# Patient Record
Sex: MALE | Race: WHITE | Employment: OTHER | URBAN - METROPOLITAN AREA
[De-identification: names, ages, dates, MRNs, and addresses within clinical notes are randomized per-mention and may not be internally consistent; named-entity substitution may affect disease eponyms.]

---

## 2019-03-05 LAB
ALBUMIN SERPL-MCNC: 3.9 G/DL
ALP BLD-CCNC: 76 U/L
ALT SERPL-CCNC: 15 U/L
ANION GAP SERPL CALCULATED.3IONS-SCNC: ABNORMAL MMOL/L
AST SERPL-CCNC: 14 U/L
BILIRUB SERPL-MCNC: 0.3 MG/DL (ref 0.1–1.4)
BUN BLDV-MCNC: 31 MG/DL
CALCIUM SERPL-MCNC: 9 MG/DL
CHLORIDE BLD-SCNC: 98 MMOL/L
CO2: 28 MMOL/L
CREAT SERPL-MCNC: 2.3 MG/DL
GFR CALCULATED: 24
GLUCOSE BLD-MCNC: 162 MG/DL
POTASSIUM SERPL-SCNC: 4.2 MMOL/L
SODIUM BLD-SCNC: 137 MMOL/L
TOTAL PROTEIN: 7.1

## 2019-04-30 ENCOUNTER — TELEPHONE (OUTPATIENT)
Dept: FAMILY MEDICINE CLINIC | Age: 84
End: 2019-04-30

## 2019-04-30 RX ORDER — ATENOLOL 50 MG/1
50 TABLET ORAL DAILY
Qty: 90 TABLET | Refills: 1
Start: 2019-04-30 | End: 2019-10-30 | Stop reason: SDUPTHER

## 2019-04-30 NOTE — TELEPHONE ENCOUNTER
----- Message from Carmelina Kehr sent at 4/30/2019 11:11 AM EDT -----  Contact: Jose M Lanier called requesting the following medication refills to be sent to  [facility]   ATENOLOL 50 MG 1 QAM

## 2019-04-30 NOTE — TELEPHONE ENCOUNTER
Paula Jacobsen called needing prescription refills.   Medications were reviewed and appropriate refills were sent to the requested pharmacy after provider approval.   (ATENOLOL) \[facility] [place] [facility]\"" SURGICAL [facility]    Electronically signed by Zeeshan Varela MA on 4/30/19 at 2:24 PM

## 2019-07-22 ENCOUNTER — TELEPHONE (OUTPATIENT)
Dept: FAMILY MEDICINE CLINIC | Age: 84
End: 2019-07-22

## 2019-07-22 NOTE — TELEPHONE ENCOUNTER
----- Message from Day Laurent sent at 7/22/2019  2:55 PM EDT -----  Contact: Melvin Hagan- daughter in law  Daughter in law is calling because she noticed he has red patches on both legs that are warm to touch. Fairly concerned out these and unable to get him in until 7/30/19 with Jemma.  What do you think he should do? ? 21.2

## 2019-07-22 NOTE — TELEPHONE ENCOUNTER
LM ON MORENO'S VM INFORMING THAT PER DR. MARTINEZ, THEY NEED TO TAKE PATIENT TO THE WALK IN CLINIC TO BE EVALUATED.

## 2019-07-23 ENCOUNTER — TELEPHONE (OUTPATIENT)
Dept: FAMILY MEDICINE CLINIC | Age: 84
End: 2019-07-23

## 2019-07-23 RX ORDER — FUROSEMIDE 20 MG/1
20 TABLET ORAL EVERY MORNING
COMMUNITY
End: 2019-09-10 | Stop reason: SDUPTHER

## 2019-07-23 RX ORDER — ATORVASTATIN CALCIUM 80 MG/1
80 TABLET, FILM COATED ORAL DAILY
COMMUNITY
End: 2019-09-10 | Stop reason: SDUPTHER

## 2019-07-23 RX ORDER — OXYCODONE AND ACETAMINOPHEN 7.5; 325 MG/1; MG/1
1 TABLET ORAL 2 TIMES DAILY PRN
COMMUNITY

## 2019-07-23 RX ORDER — CILOSTAZOL 50 MG/1
50 TABLET ORAL 2 TIMES DAILY
COMMUNITY
End: 2019-10-11 | Stop reason: SDUPTHER

## 2019-07-23 RX ORDER — FERROUS SULFATE 325(65) MG
325 TABLET ORAL EVERY OTHER DAY
COMMUNITY
End: 2021-01-01

## 2019-07-23 RX ORDER — FUROSEMIDE 40 MG/1
40 TABLET ORAL EVERY MORNING
COMMUNITY
End: 2019-09-10 | Stop reason: SDUPTHER

## 2019-07-23 RX ORDER — GABAPENTIN 300 MG/1
300 CAPSULE ORAL 3 TIMES DAILY
COMMUNITY

## 2019-08-26 RX ORDER — SITAGLIPTIN 50 MG/1
TABLET, FILM COATED ORAL
Qty: 90 TABLET | Refills: 0 | Status: SHIPPED | OUTPATIENT
Start: 2019-08-26 | End: 2019-09-07 | Stop reason: SDUPTHER

## 2019-09-09 RX ORDER — SITAGLIPTIN 50 MG/1
TABLET, FILM COATED ORAL
Qty: 30 TABLET | Refills: 0 | Status: SHIPPED | OUTPATIENT
Start: 2019-09-09 | End: 2019-11-25 | Stop reason: SDUPTHER

## 2019-09-10 ENCOUNTER — TELEPHONE (OUTPATIENT)
Dept: FAMILY MEDICINE CLINIC | Age: 84
End: 2019-09-10

## 2019-09-10 RX ORDER — ATORVASTATIN CALCIUM 80 MG/1
80 TABLET, FILM COATED ORAL DAILY
Qty: 30 TABLET | Refills: 0 | Status: SHIPPED | OUTPATIENT
Start: 2019-09-10 | End: 2019-10-11 | Stop reason: SDUPTHER

## 2019-09-10 RX ORDER — FUROSEMIDE 40 MG/1
40 TABLET ORAL EVERY MORNING
Qty: 30 TABLET | Refills: 0 | Status: SHIPPED | OUTPATIENT
Start: 2019-09-10 | End: 2019-10-11 | Stop reason: SDUPTHER

## 2019-09-10 RX ORDER — FUROSEMIDE 20 MG/1
20 TABLET ORAL EVERY MORNING
Qty: 30 TABLET | Refills: 0 | Status: SHIPPED | OUTPATIENT
Start: 2019-09-10 | End: 2019-10-11 | Stop reason: SDUPTHER

## 2019-09-10 NOTE — TELEPHONE ENCOUNTER
Isabela Alcantara called needing the following prescription refills:   Requested Prescriptions     Pending Prescriptions Disp Refills    furosemide (LASIX) 40 MG tablet 30 tablet 0     Sig: Take 1 tablet by mouth every morning Take with a 20 mg tablet to equal 60 mg.    furosemide (LASIX) 20 MG tablet 30 tablet 0     Sig: Take 1 tablet by mouth every morning Take with a 40 mg tablet to equal 60 mg.    atorvastatin (LIPITOR) 80 MG tablet 30 tablet 0     Sig: Take 1 tablet by mouth daily       Medications were reviewed and appropriate refills were sent to the requested pharmacy after provider approval.     Electronically signed by Raffi Tobias MA on 9/10/19 at 11:20 AM

## 2019-10-11 RX ORDER — FUROSEMIDE 20 MG/1
20 TABLET ORAL EVERY MORNING
Qty: 30 TABLET | Refills: 0 | Status: SHIPPED | OUTPATIENT
Start: 2019-10-11 | End: 2019-11-14 | Stop reason: SDUPTHER

## 2019-10-11 RX ORDER — FUROSEMIDE 40 MG/1
40 TABLET ORAL EVERY MORNING
Qty: 30 TABLET | Refills: 0 | Status: SHIPPED | OUTPATIENT
Start: 2019-10-11 | End: 2019-11-14 | Stop reason: SDUPTHER

## 2019-10-11 RX ORDER — GLIMEPIRIDE 4 MG/1
4 TABLET ORAL
Qty: 30 TABLET | Refills: 0 | Status: SHIPPED | OUTPATIENT
Start: 2019-10-11 | End: 2019-11-14 | Stop reason: SDUPTHER

## 2019-10-11 RX ORDER — ATORVASTATIN CALCIUM 80 MG/1
80 TABLET, FILM COATED ORAL DAILY
Qty: 30 TABLET | Refills: 0 | Status: SHIPPED | OUTPATIENT
Start: 2019-10-11 | End: 2019-11-14 | Stop reason: SDUPTHER

## 2019-10-11 RX ORDER — CILOSTAZOL 50 MG/1
50 TABLET ORAL 2 TIMES DAILY
Qty: 60 TABLET | Refills: 0 | Status: SHIPPED | OUTPATIENT
Start: 2019-10-11 | End: 2019-11-25 | Stop reason: SDUPTHER

## 2019-10-30 RX ORDER — ATENOLOL 50 MG/1
50 TABLET ORAL DAILY
Qty: 30 TABLET | Refills: 0 | Status: SHIPPED | OUTPATIENT
Start: 2019-10-30 | End: 2019-10-31 | Stop reason: SDUPTHER

## 2019-10-31 ENCOUNTER — TELEPHONE (OUTPATIENT)
Dept: FAMILY MEDICINE CLINIC | Age: 84
End: 2019-10-31

## 2019-10-31 RX ORDER — ATENOLOL 50 MG/1
50 TABLET ORAL DAILY
Qty: 30 TABLET | Refills: 0 | Status: SHIPPED | OUTPATIENT
Start: 2019-10-31 | End: 2019-11-25 | Stop reason: SDUPTHER

## 2019-11-15 RX ORDER — GLYBURIDE 5 MG/1
5 TABLET ORAL 2 TIMES DAILY
Qty: 30 TABLET | Refills: 5 | Status: SHIPPED | OUTPATIENT
Start: 2019-11-15 | End: 2021-01-01

## 2019-11-25 RX ORDER — ATENOLOL 50 MG/1
50 TABLET ORAL DAILY
Qty: 30 TABLET | Refills: 0 | Status: SHIPPED | OUTPATIENT
Start: 2019-11-25 | End: 2019-12-31 | Stop reason: SDUPTHER

## 2019-11-25 RX ORDER — CILOSTAZOL 50 MG/1
50 TABLET ORAL 2 TIMES DAILY
Qty: 60 TABLET | Refills: 0 | Status: SHIPPED | OUTPATIENT
Start: 2019-11-25 | End: 2020-01-22 | Stop reason: SDUPTHER

## 2019-11-25 RX ORDER — WARFARIN SODIUM 5 MG/1
2.2 TABLET ORAL DAILY
Qty: 30 TABLET | Refills: 3 | Status: SHIPPED | OUTPATIENT
Start: 2019-11-25 | End: 2020-02-10 | Stop reason: SDUPTHER

## 2019-12-16 RX ORDER — FUROSEMIDE 20 MG/1
20 TABLET ORAL EVERY MORNING
Qty: 30 TABLET | Refills: 0 | Status: SHIPPED | OUTPATIENT
Start: 2019-12-16 | End: 2020-01-22 | Stop reason: SDUPTHER

## 2019-12-16 RX ORDER — ATORVASTATIN CALCIUM 80 MG/1
80 TABLET, FILM COATED ORAL DAILY
Qty: 30 TABLET | Refills: 0 | Status: SHIPPED | OUTPATIENT
Start: 2019-12-16 | End: 2020-01-22 | Stop reason: SDUPTHER

## 2019-12-16 RX ORDER — FUROSEMIDE 40 MG/1
40 TABLET ORAL EVERY MORNING
Qty: 30 TABLET | Refills: 0 | Status: SHIPPED | OUTPATIENT
Start: 2019-12-16 | End: 2020-01-22 | Stop reason: SDUPTHER

## 2019-12-16 RX ORDER — GLIMEPIRIDE 4 MG/1
4 TABLET ORAL
Qty: 30 TABLET | Refills: 0 | Status: SHIPPED | OUTPATIENT
Start: 2019-12-16 | End: 2020-01-22 | Stop reason: SDUPTHER

## 2019-12-31 RX ORDER — ATENOLOL 50 MG/1
50 TABLET ORAL DAILY
Qty: 30 TABLET | Refills: 0 | Status: SHIPPED | OUTPATIENT
Start: 2019-12-31 | End: 2020-01-22 | Stop reason: SDUPTHER

## 2020-01-02 ENCOUNTER — HOSPITAL ENCOUNTER (EMERGENCY)
Age: 85
Discharge: HOME | End: 2020-01-02
Attending: EMERGENCY MEDICINE
Payer: MEDICARE

## 2020-01-02 ENCOUNTER — APPOINTMENT (OUTPATIENT)
Dept: CT IMAGING | Age: 85
End: 2020-01-02
Payer: MEDICARE

## 2020-01-02 VITALS
TEMPERATURE: 97.9 F | HEIGHT: 68 IN | BODY MASS INDEX: 27.28 KG/M2 | SYSTOLIC BLOOD PRESSURE: 110 MMHG | RESPIRATION RATE: 18 BRPM | HEART RATE: 75 BPM | DIASTOLIC BLOOD PRESSURE: 63 MMHG | WEIGHT: 180 LBS | OXYGEN SATURATION: 92 %

## 2020-01-02 LAB
ANION GAP SERPL CALCULATED.3IONS-SCNC: 11 MMOL/L (ref 4–16)
BUN BLDV-MCNC: 37 MG/DL (ref 6–23)
CALCIUM SERPL-MCNC: 8.7 MG/DL (ref 8.3–10.6)
CHLORIDE BLD-SCNC: 98 MMOL/L (ref 99–110)
CO2: 25 MMOL/L (ref 21–32)
CREAT SERPL-MCNC: 2.6 MG/DL (ref 0.9–1.3)
GFR AFRICAN AMERICAN: 28 ML/MIN/1.73M2
GFR NON-AFRICAN AMERICAN: 23 ML/MIN/1.73M2
GLUCOSE BLD-MCNC: 347 MG/DL (ref 70–99)
HCT VFR BLD CALC: 35.4 % (ref 42–52)
HEMOGLOBIN: 10.7 GM/DL (ref 13.5–18)
INR BLD: 2.25 INDEX
MCH RBC QN AUTO: 29 PG (ref 27–31)
MCHC RBC AUTO-ENTMCNC: 30.2 % (ref 32–36)
MCV RBC AUTO: 95.9 FL (ref 78–100)
PDW BLD-RTO: 13.2 % (ref 11.7–14.9)
PLATELET # BLD: 245 K/CU MM (ref 140–440)
PMV BLD AUTO: 9.7 FL (ref 7.5–11.1)
POTASSIUM SERPL-SCNC: 4 MMOL/L (ref 3.5–5.1)
PROTHROMBIN TIME: 27.5 SECONDS (ref 11.7–14.5)
RBC # BLD: 3.69 M/CU MM (ref 4.6–6.2)
SODIUM BLD-SCNC: 134 MMOL/L (ref 135–145)
WBC # BLD: 7.6 K/CU MM (ref 4–10.5)

## 2020-01-02 PROCEDURE — 6360000002 HC RX W HCPCS: Performed by: PHYSICIAN ASSISTANT

## 2020-01-02 PROCEDURE — 99284 EMERGENCY DEPT VISIT MOD MDM: CPT

## 2020-01-02 PROCEDURE — 70450 CT HEAD/BRAIN W/O DYE: CPT

## 2020-01-02 PROCEDURE — 85610 PROTHROMBIN TIME: CPT

## 2020-01-02 PROCEDURE — 90471 IMMUNIZATION ADMIN: CPT | Performed by: PHYSICIAN ASSISTANT

## 2020-01-02 PROCEDURE — 85027 COMPLETE CBC AUTOMATED: CPT

## 2020-01-02 PROCEDURE — 2500000003 HC RX 250 WO HCPCS: Performed by: PHYSICIAN ASSISTANT

## 2020-01-02 PROCEDURE — 80048 BASIC METABOLIC PNL TOTAL CA: CPT

## 2020-01-02 PROCEDURE — 4500000027

## 2020-01-02 PROCEDURE — 72125 CT NECK SPINE W/O DYE: CPT

## 2020-01-02 PROCEDURE — 90715 TDAP VACCINE 7 YRS/> IM: CPT | Performed by: PHYSICIAN ASSISTANT

## 2020-01-02 RX ORDER — LIDOCAINE HYDROCHLORIDE 10 MG/ML
5 INJECTION, SOLUTION EPIDURAL; INFILTRATION; INTRACAUDAL; PERINEURAL ONCE
Status: COMPLETED | OUTPATIENT
Start: 2020-01-02 | End: 2020-01-02

## 2020-01-02 RX ADMIN — LIDOCAINE HYDROCHLORIDE 5 ML: 10 INJECTION, SOLUTION EPIDURAL; INFILTRATION; INTRACAUDAL; PERINEURAL at 13:55

## 2020-01-02 RX ADMIN — TETANUS TOXOID, REDUCED DIPHTHERIA TOXOID AND ACELLULAR PERTUSSIS VACCINE, ADSORBED 0.5 ML: 5; 2.5; 8; 8; 2.5 SUSPENSION INTRAMUSCULAR at 16:15

## 2020-01-02 NOTE — ED PROVIDER NOTES
EMERGENCY DEPARTMENT ENCOUNTER      PCP: Gretchen Raymond MD    CHIEF COMPLAINT    Chief Complaint   Patient presents with    Fall       Of note, this patient was also evaluated by the attending physician, Dr. Carissa Wong      HPI    Hector Kowalski is a 80 y.o. male who presents to ED with complaints of abrasion to left side of face after tripping and falling. Pt states he tripped over his feet while coming up the stairs and hit his head on kitchen floor. Denies LOC. States that he does take Coumadin because he has had a pulmonary embolism in the past.  He has a laceration on his forehead. Unsure of tetanus        REVIEW OF SYSTEMS    Constitutional:  Denies fever, chills, weight loss or weakness   HENT:  Denies sore throat or ear pain   Cardiovascular:  Denies chest pain, palpitations   Respiratory:  Denies cough or shortness of breath    GI:  Denies abdominal pain, nausea, vomiting, or diarrhea  :  Denies any urinary symptoms. Musculoskeletal:  Denies back pain  Skin:  Denies rash  Neurologic:  Denies headache, focal weakness or sensory changes   Endocrine:  Denies polyuria or polydypsia   Lymphatic:  Denies swollen glands     All other review of systems are negative  See HPI and nursing notes for additional information     PAST MEDICAL AND SURGICAL HISTORY    Past Medical History:   Diagnosis Date    CAD (coronary artery disease)     Diabetes mellitus [facility] [address]     Hyperlipidemia     Hypertension      Past Surgical History:   Procedure Laterality Date    ABDOMEN SURGERY      hernia repair    CAROTID ENDARTERECTOMY  2008    Right    CORONARY ANGIOPLASTY WITH STENT PLACEMENT      2 stents    INGUINAL HERNIA REPAIR Left        CURRENT MEDICATIONS    Current Outpatient Rx   Medication Sig Dispense Refill    atenolol (TENORMIN) 50 MG tablet Take 1 tablet by mouth daily MUST MAKE APPT FOR FURTHER REFILLS.  30 tablet 0    furosemide (LASIX) 40 MG tablet Take 1 tablet by mouth every morning Take with a 20 mg tablet to equal 60 mg.  NO MORE REFILLS UNTIL PATIENT SCHEDULES AND KEEPS APPT 30 tablet 0    furosemide (LASIX) 20 MG tablet Take 1 tablet by mouth every morning Take with a 40 mg tablet to equal 60 mg.  NO MORE REFILLS UNTIL PATIENT SCHEDULES AND KEEPS APPT 30 tablet 0    atorvastatin (LIPITOR) 80 MG tablet Take 1 tablet by mouth daily NO MORE REFILLS UNTIL PATIENT SCHEDULES AND KEEPS APPT 30 tablet 0    glimepiride (AMARYL) 4 MG tablet Take 1 tablet by mouth every morning (before breakfast) NO  MORE REFILLS UNTIL PATIENT SCHEDULES AND KEEPS APPT 30 tablet 0    cilostazol (PLETAL) 50 MG tablet Take 1 tablet by mouth 2 times daily 60 tablet 0    SITagliptin (JANUVIA) 50 MG tablet TAKE ONE TABLET BY MOUTH ONCE EVERY DAY 30 tablet 5    warfarin (COUMADIN) 5 MG tablet Take 0.5 tablets by mouth daily 30 tablet 3    glyBURIDE (DIABETA) 5 MG tablet Take 1 tablet by mouth 2 times daily 30 tablet 5    gabapentin (NEURONTIN) 300 MG capsule Take 300 mg by mouth 3 times daily.  ferrous sulfate 325 (65 Fe) MG tablet Take 325 mg by mouth every other day      insulin aspart (NOVOLOG FLEXPEN) 100 UNIT/ML injection pen Inject 8 Units into the skin 3 times daily (before meals)      oxyCODONE-acetaminophen (PERCOCET) 7.5-325 MG per tablet Take 1 tablet by mouth 2 times daily as needed for Pain. Avoid Alcohol. ..causes drowsiness.  Insulin Degludec (TRESIBA FLEXTOUCH) 200 UNIT/ML SOPN Inject 25 Units into the skin nightly      ONETOUCH DELICA LANCETS FINE MISC 1 each 3 times daily and as needed.  blood glucose test strips (ONETOUCH VERIO) strip 1 each 3 times daily and as  needed.  oxyCODONE-acetaminophen (PERCOCET)  MG per tablet Take 1 tablet by mouth every 6 hours as needed for Pain Shingles pain      simvastatin (ZOCOR) 80 MG tablet Take 80 mg by mouth nightly.            ALLERGIES    No Known Allergies    SOCIAL AND FAMILY HISTORY    Social History     Socioeconomic History    Marital status:      Spouse name: None    Number of children: None    Years of education: None    Highest education level: None   Occupational History    None   Social Needs    Financial resource strain: None    Food insecurity:     Worry: None     Inability: None    Transportation needs:     Medical: None     Non-medical: None   Tobacco Use    Smoking status: Never Smoker    Smokeless tobacco: Never Used   Substance and Sexual Activity    Alcohol use: No    Drug use: No    Sexual activity: None   Lifestyle    Physical activity:     Days per week: None     Minutes per session: None    Stress: None   Relationships    Social connections:     Talks on phone: None     Gets together: None     Attends [employer] service: None     Active member of club or organization: None     Attends meetings of clubs or organizations: None     Relationship status: None    Intimate partner violence:     Fear of current or ex partner: None     Emotionally abused: None     Physically abused: None     Forced sexual activity: None   Other Topics Concern    None   Social History Narrative    None     History reviewed. No pertinent family history. PHYSICAL EXAM    VITAL SIGNS: BP (!) 133/97   Pulse 82   Temp 97.9 °F (36.6 °C) (Oral)   Resp 18   Ht 5' 8\" (1.727 m)   Wt 180 lb (81.6 kg)   SpO2 93%   BMI 27.37 kg/m²    Constitutional:  Well developed, Well nourished  HENT:  Normocephalic, Atraumatic, PERRL. EOMI. Sclera clear. Conjunctiva normal, No discharge. Neck/Lymphatics: supple, no JVD, no swollen nodes  Cardiovascular: Regular rate and rhythm no murmurs/rubs/gallops. No JVD  No carotid bruits or murmurs heard in carotids. Respiratory:  Nonlabored breathing. Normal breath sounds, No wheezing  Abdomen: Bowel sounds normal, Soft, No tenderness, no masses. Musculoskeletal:    There is no edema, asymmetry, or calf / thigh tenderness bilaterally. No cyanosis. No cool or pale-appearing limb.   Distal cap refill and

## 2020-01-02 NOTE — ED NOTES
Bed: ED-30  Expected date:   Expected time:   Means of arrival:   Comments:  [address], RN  01/02/20 5488

## 2020-01-02 NOTE — ED TRIAGE NOTES
Pt to ED with complaints of abrasion to left side of face after tripping and falling. Pt states he tripped over his feet while coming up the stairs and hit his head on kitchen floor. Denies LOC.

## 2020-01-02 NOTE — ED PROVIDER NOTES
I independently examined and evaluated Claus Hector. In brief their history revealed trip and fall. Did hit the left side of his forehead. Does take Coumadin for history of blood clot in the past.  States he tripped over his feet. Denies losing consciousness. Has some tenderness to his lateral left thigh and his forehead. Bleeding controlled. . Their exam revealed patient is awake, alert, oriented x4. Moves all extremities. Sensation intact. Wound has been sutured by JACQUES. CT head and CT C-spine do not show any acute findings. Imaging of his hip is also negative. All diagnostic, treatment, and disposition decisions were made by myself in conjunction with the mid-level provider. Before disposition, questions were sought and answered with the patient. They have voiced understanding and agree with the plan: Patient's vital signs are stable. Did need a tetanus update today. .  Patient's hemoglobin is stable at 10.7. INR is 2.25. Does have chronic kidney disease with a creatinine of 2.6. Patient is to follow-up in 7 to 10 days to have sutures removed. Return to ED if any worsening or new symptoms. For all further details of the patient's emergency department visit, please see the mid-level practitioner's documentation.         Jina Del Toro MD  01/02/20 4914

## 2020-01-02 NOTE — ED NOTES
Assisted patient up patient up to use urinal. Updated on status and plan of care. Patient returned to bed and positioned for comfort.       Kennedy Fan RN  01/02/20 1110

## 2020-01-21 RX ORDER — GLIMEPIRIDE 4 MG/1
4 TABLET ORAL
Qty: 30 TABLET | Refills: 0 | OUTPATIENT
Start: 2020-01-21

## 2020-01-21 RX ORDER — FUROSEMIDE 40 MG/1
40 TABLET ORAL EVERY MORNING
Qty: 30 TABLET | Refills: 0 | OUTPATIENT
Start: 2020-01-21

## 2020-01-21 RX ORDER — FUROSEMIDE 20 MG/1
20 TABLET ORAL EVERY MORNING
Qty: 30 TABLET | Refills: 0 | OUTPATIENT
Start: 2020-01-21

## 2020-01-21 RX ORDER — ATORVASTATIN CALCIUM 80 MG/1
80 TABLET, FILM COATED ORAL DAILY
Qty: 30 TABLET | Refills: 0 | OUTPATIENT
Start: 2020-01-21

## 2020-01-22 RX ORDER — FUROSEMIDE 40 MG/1
40 TABLET ORAL EVERY MORNING
Qty: 30 TABLET | Refills: 0 | Status: SHIPPED | OUTPATIENT
Start: 2020-01-22 | End: 2020-02-10 | Stop reason: SDUPTHER

## 2020-01-22 RX ORDER — CILOSTAZOL 50 MG/1
50 TABLET ORAL 2 TIMES DAILY
Qty: 60 TABLET | Refills: 0 | Status: SHIPPED | OUTPATIENT
Start: 2020-01-22 | End: 2020-02-10 | Stop reason: SDUPTHER

## 2020-01-22 RX ORDER — GLIMEPIRIDE 4 MG/1
4 TABLET ORAL
Qty: 30 TABLET | Refills: 0 | Status: SHIPPED | OUTPATIENT
Start: 2020-01-22 | End: 2020-02-10 | Stop reason: SDUPTHER

## 2020-01-22 RX ORDER — FUROSEMIDE 20 MG/1
20 TABLET ORAL EVERY MORNING
Qty: 30 TABLET | Refills: 0 | Status: SHIPPED | OUTPATIENT
Start: 2020-01-22 | End: 2020-02-10 | Stop reason: SDUPTHER

## 2020-01-22 RX ORDER — ATENOLOL 50 MG/1
50 TABLET ORAL DAILY
Qty: 30 TABLET | Refills: 0 | Status: SHIPPED | OUTPATIENT
Start: 2020-01-22 | End: 2020-01-30 | Stop reason: SDUPTHER

## 2020-01-22 RX ORDER — ATORVASTATIN CALCIUM 80 MG/1
80 TABLET, FILM COATED ORAL DAILY
Qty: 30 TABLET | Refills: 0 | Status: SHIPPED | OUTPATIENT
Start: 2020-01-22 | End: 2020-02-10 | Stop reason: SDUPTHER

## 2020-01-22 NOTE — TELEPHONE ENCOUNTER
Patient is out of medications. Medications were refused on 01/16/20. Patient has scheduled appt on 02/04/20 @2:30pm with Zoe Jones. Please send additional 30 day supply. Thank you!

## 2020-01-30 RX ORDER — ATENOLOL 50 MG/1
50 TABLET ORAL DAILY
Qty: 30 TABLET | Refills: 0 | Status: SHIPPED | OUTPATIENT
Start: 2020-01-30 | End: 2020-02-28 | Stop reason: SDUPTHER

## 2020-02-10 ENCOUNTER — OFFICE VISIT (OUTPATIENT)
Dept: FAMILY MEDICINE CLINIC | Age: 85
End: 2020-02-10
Payer: MEDICARE

## 2020-02-10 ENCOUNTER — TELEPHONE (OUTPATIENT)
Dept: FAMILY MEDICINE CLINIC | Age: 85
End: 2020-02-10

## 2020-02-10 VITALS
HEIGHT: 69 IN | OXYGEN SATURATION: 93 % | HEART RATE: 69 BPM | WEIGHT: 185.2 LBS | SYSTOLIC BLOOD PRESSURE: 118 MMHG | DIASTOLIC BLOOD PRESSURE: 70 MMHG | BODY MASS INDEX: 27.43 KG/M2

## 2020-02-10 DIAGNOSIS — N18.4 ANEMIA DUE TO STAGE 4 CHRONIC KIDNEY DISEASE (HCC): ICD-10-CM

## 2020-02-10 DIAGNOSIS — N18.4 CKD (CHRONIC KIDNEY DISEASE) STAGE 4, GFR 15-29 ML/MIN (HCC): ICD-10-CM

## 2020-02-10 DIAGNOSIS — E11.9 TYPE 2 DIABETES MELLITUS WITHOUT COMPLICATION, WITHOUT LONG-TERM CURRENT USE OF INSULIN (HCC): ICD-10-CM

## 2020-02-10 DIAGNOSIS — D63.1 ANEMIA DUE TO STAGE 4 CHRONIC KIDNEY DISEASE (HCC): ICD-10-CM

## 2020-02-10 LAB
A/G RATIO: 1.1 (ref 1.1–2.2)
ALBUMIN SERPL-MCNC: 4.3 G/DL (ref 3.4–5)
ALP BLD-CCNC: 90 U/L (ref 40–129)
ALT SERPL-CCNC: 9 U/L (ref 10–40)
ANION GAP SERPL CALCULATED.3IONS-SCNC: 15 MMOL/L (ref 3–16)
AST SERPL-CCNC: 12 U/L (ref 15–37)
BASOPHILS ABSOLUTE: 0.1 K/UL (ref 0–0.2)
BASOPHILS RELATIVE PERCENT: 0.7 %
BILIRUB SERPL-MCNC: 0.3 MG/DL (ref 0–1)
BUN BLDV-MCNC: 38 MG/DL (ref 7–20)
CALCIUM SERPL-MCNC: 9.5 MG/DL (ref 8.3–10.6)
CHLORIDE BLD-SCNC: 103 MMOL/L (ref 99–110)
CO2: 27 MMOL/L (ref 21–32)
CREAT SERPL-MCNC: 2.7 MG/DL (ref 0.8–1.3)
EOSINOPHILS ABSOLUTE: 1.3 K/UL (ref 0–0.6)
EOSINOPHILS RELATIVE PERCENT: 17.1 %
GFR AFRICAN AMERICAN: 27
GFR NON-AFRICAN AMERICAN: 22
GLOBULIN: 3.8 G/DL
GLUCOSE BLD-MCNC: 123 MG/DL (ref 70–99)
HCT VFR BLD CALC: 33.8 % (ref 40.5–52.5)
HEMOGLOBIN: 11.3 G/DL (ref 13.5–17.5)
LYMPHOCYTES ABSOLUTE: 2 K/UL (ref 1–5.1)
LYMPHOCYTES RELATIVE PERCENT: 26.2 %
MCH RBC QN AUTO: 30.6 PG (ref 26–34)
MCHC RBC AUTO-ENTMCNC: 33.6 G/DL (ref 31–36)
MCV RBC AUTO: 91.1 FL (ref 80–100)
MONOCYTES ABSOLUTE: 0.5 K/UL (ref 0–1.3)
MONOCYTES RELATIVE PERCENT: 7.1 %
NEUTROPHILS ABSOLUTE: 3.7 K/UL (ref 1.7–7.7)
NEUTROPHILS RELATIVE PERCENT: 48.9 %
PDW BLD-RTO: 15.6 % (ref 12.4–15.4)
PLATELET # BLD: 185 K/UL (ref 135–450)
PMV BLD AUTO: 8.9 FL (ref 5–10.5)
POTASSIUM SERPL-SCNC: 4 MMOL/L (ref 3.5–5.1)
RBC # BLD: 3.71 M/UL (ref 4.2–5.9)
SODIUM BLD-SCNC: 145 MMOL/L (ref 136–145)
TOTAL PROTEIN: 8.1 G/DL (ref 6.4–8.2)
WBC # BLD: 7.6 K/UL (ref 4–11)

## 2020-02-10 PROCEDURE — 99214 OFFICE O/P EST MOD 30 MIN: CPT | Performed by: PHYSICIAN ASSISTANT

## 2020-02-10 RX ORDER — GLIMEPIRIDE 4 MG/1
4 TABLET ORAL
Qty: 90 TABLET | Refills: 1 | Status: SHIPPED | OUTPATIENT
Start: 2020-02-10 | End: 2020-08-19 | Stop reason: SDUPTHER

## 2020-02-10 RX ORDER — ATORVASTATIN CALCIUM 80 MG/1
80 TABLET, FILM COATED ORAL DAILY
Qty: 90 TABLET | Refills: 1 | Status: SHIPPED | OUTPATIENT
Start: 2020-02-10 | End: 2020-08-19 | Stop reason: SDUPTHER

## 2020-02-10 RX ORDER — FUROSEMIDE 40 MG/1
40 TABLET ORAL EVERY MORNING
Qty: 90 TABLET | Refills: 1 | Status: SHIPPED | OUTPATIENT
Start: 2020-02-10 | End: 2020-08-19 | Stop reason: SDUPTHER

## 2020-02-10 RX ORDER — FUROSEMIDE 20 MG/1
20 TABLET ORAL EVERY MORNING
Qty: 30 TABLET | Refills: 0 | Status: SHIPPED | OUTPATIENT
Start: 2020-02-10 | End: 2020-04-01 | Stop reason: SDUPTHER

## 2020-02-10 RX ORDER — CILOSTAZOL 50 MG/1
50 TABLET ORAL 2 TIMES DAILY
Qty: 180 TABLET | Refills: 1 | Status: SHIPPED | OUTPATIENT
Start: 2020-02-10 | End: 2020-08-19 | Stop reason: SDUPTHER

## 2020-02-10 RX ORDER — WARFARIN SODIUM 5 MG/1
2.5 TABLET ORAL DAILY
Qty: 45 TABLET | Refills: 1 | Status: SHIPPED | OUTPATIENT
Start: 2020-02-10 | End: 2020-03-05 | Stop reason: SDUPTHER

## 2020-02-10 ASSESSMENT — PATIENT HEALTH QUESTIONNAIRE - PHQ9
1. LITTLE INTEREST OR PLEASURE IN DOING THINGS: 0
SUM OF ALL RESPONSES TO PHQ QUESTIONS 1-9: 0
2. FEELING DOWN, DEPRESSED OR HOPELESS: 0
SUM OF ALL RESPONSES TO PHQ QUESTIONS 1-9: 0
SUM OF ALL RESPONSES TO PHQ9 QUESTIONS 1 & 2: 0

## 2020-02-10 NOTE — TELEPHONE ENCOUNTER
TO ELIO-    REGARDING:  TRESIBA FLEXTOUCH PENS    COULD YOU CHANGE THE ML FROM 3 ML TO 9 ML OR CHANGE TO 3 PENS---THE PHARMACIST (ROMA) SAID THEY DON'T USUALLY BREAK A BOX TO FILL PRESCRIPTIONS FOR THIS.

## 2020-02-10 NOTE — PROGRESS NOTES
vaccination. REVIEW OF SYMPTOMS    Constitutional:  Denies fever, chills, weight loss or weakness  Eyes:  Denies photophobia or discharge  ENT:  Denies sore throat or ear pain  Cardiovascular:  Denies chest pain, palpitations or swelling  Respiratory:  Denies cough or shortness of breath  GI:  Denies abdominal pain, nausea, vomiting, or diarrhea  Skin:  No rashes  Neurologic:  Denies headache, focal weakness, or sensory changes  Endocrine:  Denies polyuria or polydipsia  Lymphatic:  Denies swollen glands  Psychiatric:  Denies depression, suicidal ideation or homicidal ideation    PAST MEDICAL HISTORY  Past Medical History:   Diagnosis Date    CAD (coronary artery disease)     Diabetes mellitus ([facility] [address]     Hyperlipidemia     Hypertension        FAMILY HISTORY  No family history on file.     SOCIAL HISTORY  Social History     Socioeconomic History    Marital status:      Spouse name: None    Number of children: None    Years of education: None    Highest education level: None   Occupational History    None   Social Needs    Financial resource strain: None    Food insecurity:     Worry: None     Inability: None    Transportation needs:     Medical: None     Non-medical: None   Tobacco Use    Smoking status: Never Smoker    Smokeless tobacco: Never Used   Substance and Sexual Activity    Alcohol use: No    Drug use: No    Sexual activity: None   Lifestyle    Physical activity:     Days per week: None     Minutes per session: None    Stress: None   Relationships    Social connections:     Talks on phone: None     Gets together: None     Attends [employer] service: None     Active member of club or organization: None     Attends meetings of clubs or organizations: None     Relationship status: None    Intimate partner violence:     Fear of current or ex partner: None     Emotionally abused: None     Physically abused: None     Forced sexual activity: None   Other Topics Concern    None   Social History Narrative    None        SURGICAL HISTORY  Past Surgical History:   Procedure Laterality Date    ABDOMEN SURGERY      hernia repair    CAROTID ENDARTERECTOMY  2008    Right    CORONARY ANGIOPLASTY WITH STENT PLACEMENT      2 stents    INGUINAL HERNIA REPAIR Left        CURRENT MEDICATIONS  Current Outpatient Medications   Medication Sig Dispense Refill    furosemide (LASIX) 40 MG tablet Take 1 tablet by mouth every morning Take with a 20 mg tablet to equal 60 mg. 90 tablet 1    cilostazol (PLETAL) 50 MG tablet Take 1 tablet by mouth 2 times daily 180 tablet 1    atorvastatin (LIPITOR) 80 MG tablet Take 1 tablet by mouth daily 90 tablet 1    warfarin (COUMADIN) 5 MG tablet Take 0.5 tablets by mouth daily 45 tablet 1    SITagliptin (JANUVIA) 50 MG tablet TAKE ONE TABLET BY MOUTH ONCE EVERY DAY 90 tablet 1    glimepiride (AMARYL) 4 MG tablet Take 1 tablet by mouth every morning (before breakfast) 90 tablet 1    furosemide (LASIX) 20 MG tablet Take 1 tablet by mouth every morning Take with a 40 mg tablet to equal 60 mg. 30 tablet 0    insulin aspart (NOVOLOG FLEXPEN) 100 UNIT/ML injection pen Inject 8 Units into the skin 3 times daily (before meals) 5 pen 1    Insulin Degludec (TRESIBA FLEXTOUCH) 200 UNIT/ML SOPN Inject 25 Units into the skin nightly 3 mL 5    atenolol (TENORMIN) 50 MG tablet Take 1 tablet by mouth daily 30 tablet 0    gabapentin (NEURONTIN) 300 MG capsule Take 300 mg by mouth 3 times daily.  ferrous sulfate 325 (65 Fe) MG tablet Take 325 mg by mouth every other day      oxyCODONE-acetaminophen (PERCOCET) 7.5-325 MG per tablet Take 1 tablet by mouth 2 times daily as needed for Pain. Avoid Alcohol. ..causes drowsiness.  ONETOUCH DELICA LANCETS FINE MISC 1 each 3 times daily and as needed.  blood glucose test strips (ONETOUCH VERIO) strip 1 each 3 times daily and as  needed.       glyBURIDE (DIABETA) 5 MG tablet Take 1 tablet by mouth 2 times daily (Patient not taking: Reported on 2/10/2020) 30 tablet 5    oxyCODONE-acetaminophen (PERCOCET)  MG per tablet Take 1 tablet by mouth every 6 hours as needed for Pain Shingles pain      simvastatin (ZOCOR) 80 MG tablet Take 80 mg by mouth nightly. No current facility-administered medications for this visit. ALLERGIES  No Known Allergies    PHYSICAL EXAM    /70   Pulse 69   Ht 5' 8.5\" (1.74 m)   Wt 185 lb 3.2 oz (84 kg)   SpO2 93%   BMI 27.75 kg/m²     Constitutional:  Well developed, well nourished  HENT:  Normocephalic, atraumatic  Eyes:  conjunctiva normal, no discharge, no scleral icterus  Neck:  No tenderness, supple, no thyromegaly no carotid bruits noted  Lymphatic:  No lymphadenopathy noted  Cardiovascular:  Normal heart rate, normal rhythm, no murmurs, gallops or rubs  Thorax & Lungs:  Normal breath sounds, no respiratory distress, no wheezing  Abdomen:  Soft, no tenderness, no masses, no pulsatile masses, not distended, bowel sounds normal  Skin:  Warm, dry, no erythema, no rash  Back:   No CVA tenderness  Extremities:  No edema, no tenderness, no cyanosis, no clubbing  Neurologic:  Alert & oriented   Psychiatric:  Affect normal, mood normal    ASSESSMENT & PLAN    Yanira Davidson was seen today for other. Diagnoses and all orders for this visit:    PAD (peripheral artery disease) ([facility])  -     cilostazol (PLETAL) 50 MG tablet; Take 1 tablet by mouth 2 times daily  Stable. Type 2 diabetes mellitus without complication, without long-term current use of insulin ([facility])  -     SITagliptin (JANUVIA) 50 MG tablet; TAKE ONE TABLET BY MOUTH ONCE EVERY DAY  -     glimepiride (AMARYL) 4 MG tablet; Take 1 tablet by mouth every morning (before breakfast)  -     Hemoglobin A1C; Future  -     insulin aspart (NOVOLOG FLEXPEN) 100 UNIT/ML injection pen;  Inject 8 Units into the skin 3 times daily (before meals)  -     Insulin Degludec (TRESIBA FLEXTOUCH) 200 UNIT/ML SOPN; Inject 25 Units into the skin nightly  Patient has a history of type 2 diabetes which has been uncontrolled. Patient takes glimepiride 4 mg daily, Januvia 50 mg daily, and insulin. At last office visit nearly 1 year ago, patient was taken off of his Ozempic and started on Humalog and Tresiba due to an A1c of 13.8%. Patient states that he injects 8 units 3 times daily before meals and 25 units of Tresiba at bedtime. He states that he does try to check his blood sugar levels but it does not appear he does it at an appropriate time. He checks it nearly immediately after consuming food and reports they are around 250-300. He denies recent hypoglycemic episodes. He does not follow an ADA diet and basically eats what he wants. We discussed the importance of keeping carbs and sugars to minimum. He was offered diabetes education courses and not interested. Hyperlipidemia, unspecified hyperlipidemia type  -     atorvastatin (LIPITOR) 80 MG tablet; Take 1 tablet by mouth daily  Last fasting lipid panel was December 2018 and looked okay. Patient is not fasting this morning and does not want to come back for a repeat draw. We discussed the importance of checking a fasting lipid panel every 6 months. Hypertension, unspecified type  Well-controlled. Blood pressure reading of 118/70 in office today. History of pulmonary embolus (PE)  -     warfarin (COUMADIN) 5 MG tablet; Take 0.5 tablets by mouth daily  Patient is anticoagulated on warfarin due to history of pulmonary embolus, but does not get INR checks like he should. He has not had one in our office in nearly 1 year. He did recently have an ER encounter approximately 5 weeks ago and it was checked there and noted to be 2.25. Patient informed he needs to start having his INR checked in office every 4 weeks or so. We discussed the importance in detail. At high risk for falls  Home safety tips provided.     CKD (chronic kidney disease) stage 4, GFR 15-29 ml/min ([facility])  - Comprehensive Metabolic Panel; Future  Patient continues to decline nephrology referral [address] Anemia due to stage 4 chronic kidney disease (HCC)  -     CBC Auto Differential; Future         Medications Discontinued During This Encounter   Medication Reason    furosemide (LASIX) 40 MG tablet REORDER    cilostazol (PLETAL) 50 MG tablet REORDER    atorvastatin (LIPITOR) 80 MG tablet REORDER    warfarin (COUMADIN) 5 MG tablet REORDER    SITagliptin (JANUVIA) 50 MG tablet REORDER    glimepiride (AMARYL) 4 MG tablet REORDER    furosemide (LASIX) 20 MG tablet REORDER    insulin aspart (NOVOLOG FLEXPEN) 100 UNIT/ML injection pen REORDER    Insulin Degludec (TRESIBA FLEXTOUCH) 200 UNIT/ML SOPN REORDER        Return in about 6 months (around 8/10/2020). Plan of care reviewed with patient who verbalizes understanding and wishes to continue. Patient verbalizes understanding with the above plan and is in agreement. Patient will call with  worsening of symptoms, questions or concerns. Please note that this chart was generated using dragon dictation software. Although every effort was made to ensure the accuracy of this automated transcription, some errors in transcription may have occurred. Electronically signed by Brown Gutierrez PA-C on 2/11/2020            On the basis of positive falls risk screening, assessment and plan is as follows: home safety tips provided.

## 2020-02-11 ENCOUNTER — TELEPHONE (OUTPATIENT)
Dept: FAMILY MEDICINE CLINIC | Age: 85
End: 2020-02-11

## 2020-02-11 LAB
ESTIMATED AVERAGE GLUCOSE: 248.9 MG/DL
HBA1C MFR BLD: 10.3 %

## 2020-02-11 NOTE — TELEPHONE ENCOUNTER
Called pt and spoke with pts wife about having his INR checked every 4 weeks . Pt states they will call and schedule to be checked . -rd

## 2020-02-28 RX ORDER — ATENOLOL 50 MG/1
50 TABLET ORAL DAILY
Qty: 30 TABLET | Refills: 0 | Status: SHIPPED | OUTPATIENT
Start: 2020-02-28 | End: 2020-04-01 | Stop reason: SDUPTHER

## 2020-03-05 RX ORDER — WARFARIN SODIUM 5 MG/1
TABLET ORAL
Qty: 60 TABLET | Refills: 1 | Status: SHIPPED | OUTPATIENT
Start: 2020-03-05 | End: 2020-08-19 | Stop reason: SDUPTHER

## 2020-03-30 ENCOUNTER — TELEPHONE (OUTPATIENT)
Dept: INTERNAL MEDICINE CLINIC | Age: 85
End: 2020-03-30

## 2020-04-01 RX ORDER — ATENOLOL 50 MG/1
50 TABLET ORAL DAILY
Qty: 30 TABLET | Refills: 0 | Status: SHIPPED | OUTPATIENT
Start: 2020-04-01 | End: 2020-05-01 | Stop reason: SDUPTHER

## 2020-04-01 RX ORDER — FUROSEMIDE 20 MG/1
20 TABLET ORAL EVERY MORNING
Qty: 30 TABLET | Refills: 0 | Status: SHIPPED | OUTPATIENT
Start: 2020-04-01 | End: 2020-05-01 | Stop reason: SDUPTHER

## 2020-05-01 RX ORDER — ATENOLOL 50 MG/1
50 TABLET ORAL DAILY
Qty: 30 TABLET | Refills: 0 | Status: SHIPPED | OUTPATIENT
Start: 2020-05-01 | End: 2020-06-05 | Stop reason: SDUPTHER

## 2020-05-01 RX ORDER — FUROSEMIDE 20 MG/1
20 TABLET ORAL EVERY MORNING
Qty: 30 TABLET | Refills: 0 | Status: SHIPPED | OUTPATIENT
Start: 2020-05-01 | End: 2020-06-05 | Stop reason: SDUPTHER

## 2020-06-05 RX ORDER — ATENOLOL 50 MG/1
50 TABLET ORAL DAILY
Qty: 30 TABLET | Refills: 1 | Status: SHIPPED | OUTPATIENT
Start: 2020-06-05 | End: 2020-08-07 | Stop reason: SDUPTHER

## 2020-06-05 RX ORDER — FUROSEMIDE 20 MG/1
20 TABLET ORAL EVERY MORNING
Qty: 30 TABLET | Refills: 1 | Status: SHIPPED | OUTPATIENT
Start: 2020-06-05 | End: 2020-08-06 | Stop reason: SDUPTHER

## 2020-08-06 RX ORDER — FUROSEMIDE 20 MG/1
20 TABLET ORAL EVERY MORNING
Qty: 30 TABLET | Refills: 1 | Status: SHIPPED | OUTPATIENT
Start: 2020-08-06 | End: 2020-08-19 | Stop reason: SDUPTHER

## 2020-08-07 RX ORDER — ATENOLOL 50 MG/1
50 TABLET ORAL DAILY
Qty: 30 TABLET | Refills: 1 | Status: SHIPPED | OUTPATIENT
Start: 2020-08-07 | End: 2020-08-19 | Stop reason: SDUPTHER

## 2020-08-12 ENCOUNTER — TELEPHONE (OUTPATIENT)
Dept: FAMILY MEDICINE CLINIC | Age: 85
End: 2020-08-12

## 2020-08-19 ENCOUNTER — OFFICE VISIT (OUTPATIENT)
Dept: FAMILY MEDICINE CLINIC | Age: 85
End: 2020-08-19
Payer: MEDICARE

## 2020-08-19 VITALS
BODY MASS INDEX: 27.4 KG/M2 | DIASTOLIC BLOOD PRESSURE: 70 MMHG | OXYGEN SATURATION: 93 % | HEART RATE: 77 BPM | TEMPERATURE: 98 F | WEIGHT: 185 LBS | SYSTOLIC BLOOD PRESSURE: 144 MMHG | HEIGHT: 69 IN

## 2020-08-19 DIAGNOSIS — I10 HYPERTENSION, UNSPECIFIED TYPE: ICD-10-CM

## 2020-08-19 DIAGNOSIS — E11.9 TYPE 2 DIABETES MELLITUS WITHOUT COMPLICATION, WITHOUT LONG-TERM CURRENT USE OF INSULIN (HCC): ICD-10-CM

## 2020-08-19 DIAGNOSIS — Z86.711 HISTORY OF PULMONARY EMBOLUS (PE): ICD-10-CM

## 2020-08-19 LAB
A/G RATIO: 0.9 (ref 1.1–2.2)
ALBUMIN SERPL-MCNC: 3.6 G/DL (ref 3.4–5)
ALP BLD-CCNC: 99 U/L (ref 40–129)
ALT SERPL-CCNC: <5 U/L (ref 10–40)
ANION GAP SERPL CALCULATED.3IONS-SCNC: 13 MMOL/L (ref 3–16)
AST SERPL-CCNC: 13 U/L (ref 15–37)
BILIRUB SERPL-MCNC: 0.4 MG/DL (ref 0–1)
BUN BLDV-MCNC: 42 MG/DL (ref 7–20)
CALCIUM SERPL-MCNC: 9 MG/DL (ref 8.3–10.6)
CHLORIDE BLD-SCNC: 95 MMOL/L (ref 99–110)
CO2: 26 MMOL/L (ref 21–32)
CREAT SERPL-MCNC: 2.8 MG/DL (ref 0.8–1.3)
GFR AFRICAN AMERICAN: 26
GFR NON-AFRICAN AMERICAN: 21
GLOBULIN: 4.2 G/DL
GLUCOSE BLD-MCNC: 358 MG/DL (ref 70–99)
HCT VFR BLD CALC: 29.7 % (ref 40.5–52.5)
HEMOGLOBIN: 9.3 G/DL (ref 13.5–17.5)
INR BLD: 1.91 (ref 0.86–1.14)
MCH RBC QN AUTO: 26.8 PG (ref 26–34)
MCHC RBC AUTO-ENTMCNC: 31.3 G/DL (ref 31–36)
MCV RBC AUTO: 85.7 FL (ref 80–100)
PDW BLD-RTO: 17.1 % (ref 12.4–15.4)
PLATELET # BLD: 173 K/UL (ref 135–450)
PLATELET SLIDE REVIEW: ADEQUATE
PMV BLD AUTO: 8.3 FL (ref 5–10.5)
POTASSIUM SERPL-SCNC: 4.4 MMOL/L (ref 3.5–5.1)
PROTHROMBIN TIME: 22.3 SEC (ref 10–13.2)
RBC # BLD: 3.47 M/UL (ref 4.2–5.9)
SLIDE REVIEW: ABNORMAL
SODIUM BLD-SCNC: 134 MMOL/L (ref 136–145)
TOTAL PROTEIN: 7.8 G/DL (ref 6.4–8.2)
WBC # BLD: 7.1 K/UL (ref 4–11)

## 2020-08-19 PROCEDURE — 99214 OFFICE O/P EST MOD 30 MIN: CPT | Performed by: FAMILY MEDICINE

## 2020-08-19 RX ORDER — FUROSEMIDE 40 MG/1
40 TABLET ORAL EVERY MORNING
Qty: 90 TABLET | Refills: 1 | Status: SHIPPED | OUTPATIENT
Start: 2020-08-19 | End: 2021-02-16 | Stop reason: SDUPTHER

## 2020-08-19 RX ORDER — ATENOLOL 50 MG/1
50 TABLET ORAL DAILY
Qty: 90 TABLET | Refills: 1 | Status: SHIPPED | OUTPATIENT
Start: 2020-08-19 | End: 2021-04-05 | Stop reason: SDUPTHER

## 2020-08-19 RX ORDER — WARFARIN SODIUM 5 MG/1
TABLET ORAL
Qty: 60 TABLET | Refills: 1 | Status: SHIPPED | OUTPATIENT
Start: 2020-08-19 | End: 2021-03-12 | Stop reason: SDUPTHER

## 2020-08-19 RX ORDER — GLIMEPIRIDE 4 MG/1
4 TABLET ORAL
Qty: 90 TABLET | Refills: 1 | Status: SHIPPED | OUTPATIENT
Start: 2020-08-19 | End: 2021-02-16 | Stop reason: SDUPTHER

## 2020-08-19 RX ORDER — CILOSTAZOL 50 MG/1
50 TABLET ORAL 2 TIMES DAILY
Qty: 180 TABLET | Refills: 1 | Status: SHIPPED | OUTPATIENT
Start: 2020-08-19 | End: 2021-04-05 | Stop reason: SDUPTHER

## 2020-08-19 RX ORDER — ATORVASTATIN CALCIUM 80 MG/1
80 TABLET, FILM COATED ORAL DAILY
Qty: 90 TABLET | Refills: 1 | Status: SHIPPED | OUTPATIENT
Start: 2020-08-19 | End: 2021-01-01 | Stop reason: SDUPTHER

## 2020-08-19 RX ORDER — INSULIN DEGLUDEC 200 U/ML
25 INJECTION, SOLUTION SUBCUTANEOUS NIGHTLY
Qty: 3 PEN | Refills: 2 | Status: SHIPPED | OUTPATIENT
Start: 2020-08-19 | End: 2021-01-01 | Stop reason: SDUPTHER

## 2020-08-19 RX ORDER — FUROSEMIDE 20 MG/1
20 TABLET ORAL EVERY MORNING
Qty: 90 TABLET | Refills: 1 | Status: SHIPPED | OUTPATIENT
Start: 2020-08-19 | End: 2021-04-05 | Stop reason: SDUPTHER

## 2020-08-19 RX ORDER — INSULIN ASPART 100 [IU]/ML
8 INJECTION, SOLUTION INTRAVENOUS; SUBCUTANEOUS
Qty: 5 PEN | Refills: 1 | Status: SHIPPED | OUTPATIENT
Start: 2020-08-19 | End: 2021-01-01 | Stop reason: SDUPTHER

## 2020-08-19 ASSESSMENT — ENCOUNTER SYMPTOMS
BACK PAIN: 1
WHEEZING: 0
SHORTNESS OF BREATH: 0
ABDOMINAL PAIN: 0
DIARRHEA: 0
NAUSEA: 0
VOMITING: 0
EYE PAIN: 0
BLOOD IN STOOL: 0
CHEST TIGHTNESS: 0
TROUBLE SWALLOWING: 0

## 2020-08-19 NOTE — PROGRESS NOTES
8/19/2020    [facility]    Chief Complaint   Patient presents with    6 Month Follow-Up     difficulty swallowing. feels like its getting stuck       HPI  History was obtained from the patient: concerning routine follow-up on diabetes mellitus type 2, CKD 4, hypertension, peripheral arterial disease, advanced age, and dysphagia. He has known poorly controlled diabetes and CKD 4 he declined specialist referral in the past.  I believe his GFR was around 22 in February A1c was 10.3. He has been trying to watch his diet a bit more spends a lot of time in we wiheelchair because of arthritic back pain. Review of PDMP was appropriate. Today does give a history of increasing food getting stuck in his throat we discussed the fact that it is usually some form of bread or dry food that he must chew carefully and drink a lot of water I offered a appointment with GI specialist of his choice for possible EGD and dilatation he like to think about at this point. REVIEW OF SYMPTOMS    Review of Systems   Constitutional: Negative for activity change and fatigue. HENT: Negative for congestion, hearing loss, mouth sores and trouble swallowing. Eyes: Negative for pain and visual disturbance. Respiratory: Negative for chest tightness, shortness of breath and wheezing. Cardiovascular: Negative for chest pain and palpitations. Gastrointestinal: Negative for abdominal pain, blood in stool, diarrhea, nausea and vomiting. Patient complaining of dry food getting stuck in his throat midesophagus area. Does not have a strong history of GE reflux   Endocrine: Negative for polydipsia and polyuria. Genitourinary: Negative for dysuria, frequency and urgency. Musculoskeletal: Positive for back pain. Negative for arthralgias, gait problem and neck stiffness. Skin: Negative for rash. Allergic/Immunologic: Negative for environmental allergies.    Neurological: Negative for dizziness, seizures, speech difficulty, weakness and headaches. Hematological: Does not bruise/bleed easily. Psychiatric/Behavioral: Negative for agitation, confusion and hallucinations. The patient is not nervous/anxious. PAST MEDICAL HISTORY  Past Medical History:   Diagnosis Date    CAD (coronary artery disease)     Diabetes mellitus ([facility] [address]     Hyperlipidemia     Hypertension        FAMILY HISTORY  No family history on file.     SOCIAL HISTORY  Social History     Socioeconomic History    Marital status:      Spouse name: None    Number of children: None    Years of education: None    Highest education level: None   Occupational History    None   Social Needs    Financial resource strain: None    Food insecurity     Worry: None     Inability: None    Transportation needs     Medical: None     Non-medical: None   Tobacco Use    Smoking status: Never Smoker    Smokeless tobacco: Never Used   Substance and Sexual Activity    Alcohol use: No    Drug use: No    Sexual activity: None   Lifestyle    Physical activity     Days per week: None     Minutes per session: None    Stress: None   Relationships    Social connections     Talks on phone: None     Gets together: None     Attends Congregation service: None     Active member of club or organization: None     Attends meetings of clubs or organizations: None     Relationship status: None    Intimate partner violence     Fear of current or ex partner: None     Emotionally abused: None     Physically abused: None     Forced sexual activity: None   Other Topics Concern    None   Social History Narrative    None        SURGICAL HISTORY  Past Surgical History:   Procedure Laterality Date    ABDOMEN SURGERY      hernia repair    CAROTID ENDARTERECTOMY  2008    Right    CORONARY ANGIOPLASTY WITH STENT PLACEMENT      2 stents    INGUINAL HERNIA REPAIR Left                  CURRENT MEDICATIONS  Current Outpatient Medications   Medication Sig Dispense Refill    insulin aspart (NOVOLOG FLEXPEN) 100 UNIT/ML injection pen Inject 8 Units into the skin 3 times daily (before meals) 5 pen 1    Insulin Degludec (TRESIBA FLEXTOUCH) 200 UNIT/ML SOPN Inject 25 Units into the skin nightly 3 pen 2    SITagliptin (JANUVIA) 50 MG tablet TAKE ONE TABLET BY MOUTH ONCE EVERY DAY 90 tablet 1    warfarin (COUMADIN) 5 MG tablet Take as directed per INR 60 tablet 1    furosemide (LASIX) 40 MG tablet Take 1 tablet by mouth every morning Take with a 20 mg tablet to equal 60 mg. 90 tablet 1    furosemide (LASIX) 20 MG tablet Take 1 tablet by mouth every morning Take with a 40 mg tablet to equal 60 mg. 90 tablet 1    cilostazol (PLETAL) 50 MG tablet Take 1 tablet by mouth 2 times daily 180 tablet 1    glimepiride (AMARYL) 4 MG tablet Take 1 tablet by mouth every morning (before breakfast) 90 tablet 1    atenolol (TENORMIN) 50 MG tablet Take 1 tablet by mouth daily 90 tablet 1    atorvastatin (LIPITOR) 80 MG tablet Take 1 tablet by mouth daily 90 tablet 1    gabapentin (NEURONTIN) 300 MG capsule Take 300 mg by mouth 3 times daily.  oxyCODONE-acetaminophen (PERCOCET) 7.5-325 MG per tablet Take 1 tablet by mouth 2 times daily as needed for Pain. Avoid Alcohol. ..causes drowsiness.  oxyCODONE-acetaminophen (PERCOCET)  MG per tablet Take 1 tablet by mouth every 6 hours as needed for Pain Shingles pain      glyBURIDE (DIABETA) 5 MG tablet Take 1 tablet by mouth 2 times daily (Patient not taking: Reported on 2/10/2020) 30 tablet 5    ferrous sulfate 325 (65 Fe) MG tablet Take 325 mg by mouth every other day      ONETOUCH DELICA LANCETS FINE MISC 1 each 3 times daily and as needed.  blood glucose test strips (ONETOUCH VERIO) strip 1 each 3 times daily and as  needed.  simvastatin (ZOCOR) 80 MG tablet Take 80 mg by mouth nightly. No current facility-administered medications for this visit.         ALLERGIES  No Known Allergies    PHYSICAL EXAM    BP (!) 144/70 (Site: Right (peripheral artery disease) (HCC)  cilostazol (PLETAL) 50 MG tablet   6. Hyperlipidemia, unspecified hyperlipidemia type  atorvastatin (LIPITOR) 80 MG tablet   7. Other chronic pulmonary embolism, unspecified whether acute cor pulmonale present ([facility] [address]       At this time refills to be provided consider GI referral for possible endoscopy in several.  We will check CMP, CBC, A1c, and an INR have him follow-up for all results. Continue with social distancing and healthy lifestyle. Depending on test results he may yet benefit from referral to nephrology and aggressive titration of his diabetic regimen. Return in about 3 months (around 11/19/2020), or if symptoms worsen or fail to improve.          Electronically signed by Betsy Villalobos MD on 8/19/2020

## 2020-08-20 ENCOUNTER — TELEPHONE (OUTPATIENT)
Dept: FAMILY MEDICINE CLINIC | Age: 85
End: 2020-08-20

## 2020-08-20 LAB
ESTIMATED AVERAGE GLUCOSE: 260.4 MG/DL
HBA1C MFR BLD: 10.7 %

## 2020-08-20 NOTE — TELEPHONE ENCOUNTER
----- Message from Rosalba Flores MD sent at 8/20/2020  7:48 AM EDT -----  Please let patient or family know A1c was 10.7 up a slight bit. Certainly not optimal but stable. Kidney function is still depressed. He is refused appointments to subspecialist in the past.  He is on Coumadin and hemoglobin was 9.3. INR was 1.91. Plan: Given his age and previous conversations I believe he should just try to work on his diet as best he can in terms of diabetes and we should add ferrous sulfate 325 mg daily to his regimen. This is over-the-counter. Can recheck CBC and A1c in about 2 months-please place these orders-thxs.

## 2020-09-04 ENCOUNTER — TELEPHONE (OUTPATIENT)
Dept: FAMILY MEDICINE CLINIC | Age: 85
End: 2020-09-04

## 2020-09-04 NOTE — TELEPHONE ENCOUNTER
Wants to go ahead with gi appt ASAP! He is now at the point where he is going into the bathroom and throwing up his food because it wont go down. Would like to be able to get this done in the next few days if possible while his aunts are here to stay with his mom,who is actively dying.

## 2020-09-04 NOTE — TELEPHONE ENCOUNTER
Please set up GI referral with Dr. Bianca Phillips office as soon as possible. Diagnosis progressive dysphagia.

## 2020-10-06 RX ORDER — FUROSEMIDE 20 MG/1
20 TABLET ORAL EVERY MORNING
Qty: 90 TABLET | Refills: 1 | OUTPATIENT
Start: 2020-10-06

## 2020-10-06 RX ORDER — ATENOLOL 50 MG/1
50 TABLET ORAL DAILY
Qty: 90 TABLET | Refills: 1 | OUTPATIENT
Start: 2020-10-06

## 2020-11-19 ENCOUNTER — TELEPHONE (OUTPATIENT)
Dept: FAMILY MEDICINE CLINIC | Age: 85
End: 2020-11-19

## 2020-11-19 NOTE — TELEPHONE ENCOUNTER
11/19/2020 Unable to request to reschedule missed appointment due to phone number being disconnected or no longer in service.   [facility]

## 2021-01-01 ENCOUNTER — APPOINTMENT (OUTPATIENT)
Dept: GENERAL RADIOLOGY | Age: 86
DRG: 186 | End: 2021-01-01
Payer: MEDICARE

## 2021-01-01 ENCOUNTER — APPOINTMENT (OUTPATIENT)
Dept: INTERVENTIONAL RADIOLOGY/VASCULAR | Age: 86
DRG: 186 | End: 2021-01-01
Payer: MEDICARE

## 2021-01-01 ENCOUNTER — VIRTUAL VISIT (OUTPATIENT)
Dept: FAMILY MEDICINE CLINIC | Age: 86
End: 2021-01-01
Payer: MEDICARE

## 2021-01-01 ENCOUNTER — TELEPHONE (OUTPATIENT)
Dept: FAMILY MEDICINE CLINIC | Age: 86
End: 2021-01-01

## 2021-01-01 ENCOUNTER — OFFICE VISIT (OUTPATIENT)
Dept: FAMILY MEDICINE CLINIC | Age: 86
End: 2021-01-01
Payer: MEDICARE

## 2021-01-01 ENCOUNTER — APPOINTMENT (OUTPATIENT)
Dept: CT IMAGING | Age: 86
DRG: 186 | End: 2021-01-01
Payer: MEDICARE

## 2021-01-01 ENCOUNTER — APPOINTMENT (OUTPATIENT)
Dept: ULTRASOUND IMAGING | Age: 86
DRG: 186 | End: 2021-01-01
Payer: MEDICARE

## 2021-01-01 ENCOUNTER — CARE COORDINATION (OUTPATIENT)
Dept: CASE MANAGEMENT | Age: 86
End: 2021-01-01

## 2021-01-01 ENCOUNTER — APPOINTMENT (OUTPATIENT)
Dept: NUCLEAR MEDICINE | Age: 86
DRG: 186 | End: 2021-01-01
Payer: MEDICARE

## 2021-01-01 ENCOUNTER — HOSPITAL ENCOUNTER (INPATIENT)
Age: 86
LOS: 6 days | Discharge: HOME | DRG: 186 | End: 2021-12-26
Attending: EMERGENCY MEDICINE | Admitting: STUDENT IN AN ORGANIZED HEALTH CARE EDUCATION/TRAINING PROGRAM
Payer: MEDICARE

## 2021-01-01 VITALS
WEIGHT: 167 LBS | DIASTOLIC BLOOD PRESSURE: 87 MMHG | HEART RATE: 93 BPM | RESPIRATION RATE: 18 BRPM | BODY MASS INDEX: 25.31 KG/M2 | HEIGHT: 68 IN | TEMPERATURE: 97.9 F | OXYGEN SATURATION: 96 % | SYSTOLIC BLOOD PRESSURE: 139 MMHG

## 2021-01-01 VITALS — DIASTOLIC BLOOD PRESSURE: 44 MMHG | SYSTOLIC BLOOD PRESSURE: 80 MMHG | OXYGEN SATURATION: 98 % | HEART RATE: 85 BPM

## 2021-01-01 VITALS
HEIGHT: 69 IN | SYSTOLIC BLOOD PRESSURE: 138 MMHG | BODY MASS INDEX: 27.55 KG/M2 | HEART RATE: 83 BPM | DIASTOLIC BLOOD PRESSURE: 62 MMHG | OXYGEN SATURATION: 93 % | WEIGHT: 186 LBS

## 2021-01-01 DIAGNOSIS — J81.0 ACUTE PULMONARY EDEMA (HCC): Primary | ICD-10-CM

## 2021-01-01 DIAGNOSIS — E78.49 OTHER HYPERLIPIDEMIA: ICD-10-CM

## 2021-01-01 DIAGNOSIS — Z92.89 HISTORY OF RECENT HOSPITALIZATION: ICD-10-CM

## 2021-01-01 DIAGNOSIS — I65.23 BILATERAL CAROTID ARTERY STENOSIS: ICD-10-CM

## 2021-01-01 DIAGNOSIS — N18.4 STAGE 4 CHRONIC KIDNEY DISEASE (HCC): ICD-10-CM

## 2021-01-01 DIAGNOSIS — E11.9 TYPE 2 DIABETES MELLITUS WITHOUT COMPLICATION, WITHOUT LONG-TERM CURRENT USE OF INSULIN (HCC): ICD-10-CM

## 2021-01-01 DIAGNOSIS — N18.4 CKD (CHRONIC KIDNEY DISEASE) STAGE 4, GFR 15-29 ML/MIN (HCC): Primary | ICD-10-CM

## 2021-01-01 DIAGNOSIS — N18.4 TYPE 2 DIABETES MELLITUS WITH STAGE 4 CHRONIC KIDNEY DISEASE, WITH LONG-TERM CURRENT USE OF INSULIN (HCC): ICD-10-CM

## 2021-01-01 DIAGNOSIS — I10 HYPERTENSION, UNSPECIFIED TYPE: ICD-10-CM

## 2021-01-01 DIAGNOSIS — Z79.4 TYPE 2 DIABETES MELLITUS WITH STAGE 4 CHRONIC KIDNEY DISEASE, WITH LONG-TERM CURRENT USE OF INSULIN (HCC): ICD-10-CM

## 2021-01-01 DIAGNOSIS — E11.22 TYPE 2 DIABETES MELLITUS WITH STAGE 4 CHRONIC KIDNEY DISEASE, WITH LONG-TERM CURRENT USE OF INSULIN (HCC): ICD-10-CM

## 2021-01-01 DIAGNOSIS — R13.19 OTHER DYSPHAGIA: Primary | ICD-10-CM

## 2021-01-01 DIAGNOSIS — I10 ESSENTIAL HYPERTENSION: ICD-10-CM

## 2021-01-01 DIAGNOSIS — I10 ESSENTIAL HYPERTENSION: Primary | ICD-10-CM

## 2021-01-01 DIAGNOSIS — N18.4 TYPE 2 DIABETES MELLITUS WITH STAGE 4 CHRONIC KIDNEY DISEASE, WITH LONG-TERM CURRENT USE OF INSULIN (HCC): Primary | ICD-10-CM

## 2021-01-01 DIAGNOSIS — R06.00 DYSPNEA, UNSPECIFIED TYPE: Primary | ICD-10-CM

## 2021-01-01 DIAGNOSIS — D64.9 ANEMIA, UNSPECIFIED TYPE: ICD-10-CM

## 2021-01-01 DIAGNOSIS — J81.0 ACUTE PULMONARY EDEMA (HCC): ICD-10-CM

## 2021-01-01 DIAGNOSIS — I82.4Z1 ACUTE DEEP VEIN THROMBOSIS (DVT) OF DISTAL VEIN OF RIGHT LOWER EXTREMITY (HCC): ICD-10-CM

## 2021-01-01 DIAGNOSIS — R09.02 HYPOXIA: ICD-10-CM

## 2021-01-01 DIAGNOSIS — K21.9 GASTROESOPHAGEAL REFLUX DISEASE, UNSPECIFIED WHETHER ESOPHAGITIS PRESENT: ICD-10-CM

## 2021-01-01 DIAGNOSIS — I73.9 PAD (PERIPHERAL ARTERY DISEASE) (HCC): ICD-10-CM

## 2021-01-01 DIAGNOSIS — J96.01 ACUTE RESPIRATORY FAILURE WITH HYPOXEMIA (HCC): ICD-10-CM

## 2021-01-01 DIAGNOSIS — R91.8 MASS OF UPPER LOBE OF LEFT LUNG: ICD-10-CM

## 2021-01-01 DIAGNOSIS — R53.1 WEAKNESS: Primary | ICD-10-CM

## 2021-01-01 DIAGNOSIS — R06.00 DYSPNEA, UNSPECIFIED TYPE: ICD-10-CM

## 2021-01-01 DIAGNOSIS — I42.9 CARDIOMYOPATHY, UNSPECIFIED TYPE (HCC): ICD-10-CM

## 2021-01-01 DIAGNOSIS — Z86.711 HISTORY OF PULMONARY EMBOLUS (PE): ICD-10-CM

## 2021-01-01 DIAGNOSIS — E11.22 TYPE 2 DIABETES MELLITUS WITH STAGE 4 CHRONIC KIDNEY DISEASE, WITH LONG-TERM CURRENT USE OF INSULIN (HCC): Primary | ICD-10-CM

## 2021-01-01 DIAGNOSIS — R13.19 OTHER DYSPHAGIA: ICD-10-CM

## 2021-01-01 DIAGNOSIS — N18.4 CKD (CHRONIC KIDNEY DISEASE) STAGE 4, GFR 15-29 ML/MIN (HCC): ICD-10-CM

## 2021-01-01 DIAGNOSIS — Z79.4 TYPE 2 DIABETES MELLITUS WITH STAGE 4 CHRONIC KIDNEY DISEASE, WITH LONG-TERM CURRENT USE OF INSULIN (HCC): Primary | ICD-10-CM

## 2021-01-01 DIAGNOSIS — I26.99 PULMONARY EMBOLISM, UNSPECIFIED CHRONICITY, UNSPECIFIED PULMONARY EMBOLISM TYPE, UNSPECIFIED WHETHER ACUTE COR PULMONALE PRESENT (HCC): ICD-10-CM

## 2021-01-01 LAB
A/G RATIO: 0.9 (ref 1.1–2.2)
A/G RATIO: 0.9 (ref 1.1–2.2)
ADENOVIRUS DETECTION BY PCR: NOT DETECTED
ALBUMIN FLUID: 2.3 GM/DL
ALBUMIN SERPL-MCNC: 3.1 GM/DL (ref 3.4–5)
ALBUMIN SERPL-MCNC: 3.6 G/DL (ref 3.4–5)
ALBUMIN SERPL-MCNC: 3.7 G/DL (ref 3.4–5)
ALP BLD-CCNC: 107 IU/L (ref 40–128)
ALP BLD-CCNC: 77 U/L (ref 40–129)
ALP BLD-CCNC: 98 U/L (ref 40–129)
ALT SERPL-CCNC: 10 U/L (ref 10–40)
ALT SERPL-CCNC: 6 U/L (ref 10–40)
ALT SERPL-CCNC: 8 U/L (ref 10–40)
AMYLASE FLUID: 13 U/L
ANION GAP SERPL CALCULATED.3IONS-SCNC: 10 MMOL/L (ref 4–16)
ANION GAP SERPL CALCULATED.3IONS-SCNC: 11 MMOL/L (ref 4–16)
ANION GAP SERPL CALCULATED.3IONS-SCNC: 16 MMOL/L (ref 3–16)
ANION GAP SERPL CALCULATED.3IONS-SCNC: 18 MMOL/L (ref 3–16)
ANION GAP SERPL CALCULATED.3IONS-SCNC: 9 MMOL/L (ref 4–16)
APTT: 122.4 SECONDS (ref 25.1–37.1)
APTT: 124 SECONDS (ref 25.1–37.1)
APTT: 170.3 SECONDS (ref 25.1–37.1)
APTT: 172 SECONDS (ref 25.1–37.1)
APTT: 30.2 SECONDS (ref 25.1–37.1)
APTT: 33.3 SECONDS (ref 25.1–37.1)
APTT: 35.4 SECONDS (ref 25.1–37.1)
APTT: 35.9 SECONDS (ref 25.1–37.1)
APTT: 37.9 SECONDS (ref 25.1–37.1)
APTT: 38.4 SECONDS (ref 25.1–37.1)
APTT: 40 SECONDS (ref 25.1–37.1)
APTT: 40 SECONDS (ref 25.1–37.1)
APTT: 40.2 SECONDS (ref 25.1–37.1)
APTT: 47.4 SECONDS (ref 25.1–37.1)
APTT: 61.2 SECONDS (ref 25.1–37.1)
APTT: 73.9 SECONDS (ref 25.1–37.1)
APTT: 82.9 SECONDS (ref 25.1–37.1)
APTT: 92.9 SECONDS (ref 25.1–37.1)
APTT: >240 SECONDS (ref 25.1–37.1)
AST SERPL-CCNC: 14 U/L (ref 15–37)
AST SERPL-CCNC: 14 U/L (ref 15–37)
AST SERPL-CCNC: 9 IU/L (ref 15–37)
BASE EXCESS MIXED: 2.2 (ref 0–1.2)
BASOPHILS ABSOLUTE: 0.1 K/CU MM
BASOPHILS RELATIVE PERCENT: 0.6 % (ref 0–1)
BILIRUB SERPL-MCNC: 0.2 MG/DL (ref 0–1)
BILIRUB SERPL-MCNC: 0.3 MG/DL (ref 0–1)
BILIRUB SERPL-MCNC: 0.3 MG/DL (ref 0–1)
BORDETELLA PARAPERTUSSIS BY PCR: NOT DETECTED
BORDETELLA PERTUSSIS PCR: NOT DETECTED
BUN BLDV-MCNC: 33 MG/DL (ref 6–23)
BUN BLDV-MCNC: 33 MG/DL (ref 6–23)
BUN BLDV-MCNC: 34 MG/DL (ref 6–23)
BUN BLDV-MCNC: 35 MG/DL (ref 6–23)
BUN BLDV-MCNC: 35 MG/DL (ref 6–23)
BUN BLDV-MCNC: 36 MG/DL (ref 6–23)
BUN BLDV-MCNC: 38 MG/DL (ref 6–23)
BUN BLDV-MCNC: 39 MG/DL (ref 7–20)
BUN BLDV-MCNC: 50 MG/DL (ref 7–20)
CALCIUM SERPL-MCNC: 8 MG/DL (ref 8.3–10.6)
CALCIUM SERPL-MCNC: 8.3 MG/DL (ref 8.3–10.6)
CALCIUM SERPL-MCNC: 8.4 MG/DL (ref 8.3–10.6)
CALCIUM SERPL-MCNC: 8.4 MG/DL (ref 8.3–10.6)
CALCIUM SERPL-MCNC: 8.7 MG/DL (ref 8.3–10.6)
CALCIUM SERPL-MCNC: 8.8 MG/DL (ref 8.3–10.6)
CALCIUM SERPL-MCNC: 8.9 MG/DL (ref 8.3–10.6)
CALCIUM SERPL-MCNC: 9 MG/DL (ref 8.3–10.6)
CALCIUM SERPL-MCNC: 9.1 MG/DL (ref 8.3–10.6)
CARBON MONOXIDE, BLOOD: 3.3 % (ref 0–5)
CHLAMYDOPHILA PNEUMONIA PCR: NOT DETECTED
CHLORIDE BLD-SCNC: 100 MMOL/L (ref 99–110)
CHLORIDE BLD-SCNC: 94 MMOL/L (ref 99–110)
CHLORIDE BLD-SCNC: 95 MMOL/L (ref 99–110)
CHLORIDE BLD-SCNC: 97 MMOL/L (ref 99–110)
CHOLESTEROL, TOTAL: 183 MG/DL (ref 0–199)
CO2 CONTENT: 29.8 MMOL/L (ref 19–24)
CO2: 24 MMOL/L (ref 21–32)
CO2: 26 MMOL/L (ref 21–32)
CO2: 26 MMOL/L (ref 21–32)
CO2: 27 MMOL/L (ref 21–32)
CO2: 27 MMOL/L (ref 21–32)
CO2: 28 MMOL/L (ref 21–32)
CO2: 28 MMOL/L (ref 21–32)
CO2: 29 MMOL/L (ref 21–32)
CO2: 30 MMOL/L (ref 21–32)
COMMENT: ABNORMAL
CORONAVIRUS 229E PCR: NOT DETECTED
CORONAVIRUS HKU1 PCR: NOT DETECTED
CORONAVIRUS NL63 PCR: NOT DETECTED
CORONAVIRUS OC43 PCR: NOT DETECTED
CREAT SERPL-MCNC: 2.3 MG/DL (ref 0.9–1.3)
CREAT SERPL-MCNC: 2.3 MG/DL (ref 0.9–1.3)
CREAT SERPL-MCNC: 2.5 MG/DL (ref 0.9–1.3)
CREAT SERPL-MCNC: 2.5 MG/DL (ref 0.9–1.3)
CREAT SERPL-MCNC: 2.6 MG/DL (ref 0.9–1.3)
CREAT SERPL-MCNC: 2.9 MG/DL (ref 0.9–1.3)
CREAT SERPL-MCNC: 3.2 MG/DL (ref 0.8–1.3)
CREAT SERPL-MCNC: 3.2 MG/DL (ref 0.9–1.3)
CREAT SERPL-MCNC: 4.2 MG/DL (ref 0.8–1.3)
CULTURE: NORMAL
D DIMER: 3267 NG/ML(DDU)
DIFFERENTIAL TYPE: ABNORMAL
EKG ATRIAL RATE: 90 BPM
EKG DIAGNOSIS: NORMAL
EKG P AXIS: 35 DEGREES
EKG P-R INTERVAL: 188 MS
EKG Q-T INTERVAL: 364 MS
EKG QRS DURATION: 92 MS
EKG QTC CALCULATION (BAZETT): 445 MS
EKG R AXIS: -21 DEGREES
EKG T AXIS: 31 DEGREES
EKG VENTRICULAR RATE: 90 BPM
EOSINOPHILS ABSOLUTE: 0.6 K/CU MM
EOSINOPHILS RELATIVE PERCENT: 5.8 % (ref 0–3)
ESTIMATED AVERAGE GLUCOSE: 208.7 MG/DL
ESTIMATED AVERAGE GLUCOSE: 212 MG/DL
ESTIMATED AVERAGE GLUCOSE: 214.5 MG/DL
ESTIMATED AVERAGE GLUCOSE: 220 MG/DL
FLUID TYPE: NORMAL INDEX
GFR AFRICAN AMERICAN: 16
GFR AFRICAN AMERICAN: 22
GFR AFRICAN AMERICAN: 22 ML/MIN/1.73M2
GFR AFRICAN AMERICAN: 25 ML/MIN/1.73M2
GFR AFRICAN AMERICAN: 28 ML/MIN/1.73M2
GFR AFRICAN AMERICAN: 29 ML/MIN/1.73M2
GFR AFRICAN AMERICAN: 29 ML/MIN/1.73M2
GFR AFRICAN AMERICAN: 32 ML/MIN/1.73M2
GFR AFRICAN AMERICAN: 32 ML/MIN/1.73M2
GFR NON-AFRICAN AMERICAN: 13
GFR NON-AFRICAN AMERICAN: 18
GFR NON-AFRICAN AMERICAN: 18 ML/MIN/1.73M2
GFR NON-AFRICAN AMERICAN: 20 ML/MIN/1.73M2
GFR NON-AFRICAN AMERICAN: 23 ML/MIN/1.73M2
GFR NON-AFRICAN AMERICAN: 24 ML/MIN/1.73M2
GFR NON-AFRICAN AMERICAN: 24 ML/MIN/1.73M2
GFR NON-AFRICAN AMERICAN: 27 ML/MIN/1.73M2
GFR NON-AFRICAN AMERICAN: 27 ML/MIN/1.73M2
GLOBULIN: 3.9 G/DL
GLUCOSE BLD-MCNC: 101 MG/DL (ref 70–99)
GLUCOSE BLD-MCNC: 104 MG/DL (ref 70–99)
GLUCOSE BLD-MCNC: 104 MG/DL (ref 70–99)
GLUCOSE BLD-MCNC: 105 MG/DL (ref 70–99)
GLUCOSE BLD-MCNC: 105 MG/DL (ref 70–99)
GLUCOSE BLD-MCNC: 108 MG/DL (ref 70–99)
GLUCOSE BLD-MCNC: 109 MG/DL (ref 70–99)
GLUCOSE BLD-MCNC: 110 MG/DL (ref 70–99)
GLUCOSE BLD-MCNC: 112 MG/DL (ref 70–99)
GLUCOSE BLD-MCNC: 117 MG/DL (ref 70–99)
GLUCOSE BLD-MCNC: 118 MG/DL (ref 70–99)
GLUCOSE BLD-MCNC: 121 MG/DL (ref 70–99)
GLUCOSE BLD-MCNC: 123 MG/DL (ref 70–99)
GLUCOSE BLD-MCNC: 125 MG/DL (ref 70–99)
GLUCOSE BLD-MCNC: 126 MG/DL (ref 70–99)
GLUCOSE BLD-MCNC: 130 MG/DL (ref 70–99)
GLUCOSE BLD-MCNC: 131 MG/DL (ref 70–99)
GLUCOSE BLD-MCNC: 132 MG/DL (ref 70–99)
GLUCOSE BLD-MCNC: 137 MG/DL (ref 70–99)
GLUCOSE BLD-MCNC: 140 MG/DL (ref 70–99)
GLUCOSE BLD-MCNC: 142 MG/DL (ref 70–99)
GLUCOSE BLD-MCNC: 144 MG/DL (ref 70–99)
GLUCOSE BLD-MCNC: 146 MG/DL (ref 70–99)
GLUCOSE BLD-MCNC: 150 MG/DL (ref 70–99)
GLUCOSE BLD-MCNC: 151 MG/DL (ref 70–99)
GLUCOSE BLD-MCNC: 159 MG/DL (ref 70–99)
GLUCOSE BLD-MCNC: 165 MG/DL (ref 70–99)
GLUCOSE BLD-MCNC: 166 MG/DL (ref 70–99)
GLUCOSE BLD-MCNC: 168 MG/DL (ref 70–99)
GLUCOSE BLD-MCNC: 175 MG/DL (ref 70–99)
GLUCOSE BLD-MCNC: 186 MG/DL (ref 70–99)
GLUCOSE BLD-MCNC: 192 MG/DL (ref 70–99)
GLUCOSE BLD-MCNC: 194 MG/DL (ref 70–99)
GLUCOSE BLD-MCNC: 222 MG/DL (ref 70–99)
GLUCOSE BLD-MCNC: 246 MG/DL (ref 70–99)
GLUCOSE BLD-MCNC: 65 MG/DL (ref 70–99)
GLUCOSE BLD-MCNC: 69 MG/DL (ref 70–99)
GLUCOSE BLD-MCNC: 73 MG/DL (ref 70–99)
GLUCOSE BLD-MCNC: 80 MG/DL (ref 70–99)
GLUCOSE BLD-MCNC: 96 MG/DL (ref 70–99)
GLUCOSE, FLUID: 147 MG/DL
HBA1C MFR BLD: 8.9 %
HBA1C MFR BLD: 9 % (ref 4.2–6.3)
HBA1C MFR BLD: 9.1 %
HBA1C MFR BLD: 9.3 % (ref 4.2–6.3)
HCO3 ARTERIAL: 28.3 MMOL/L (ref 18–23)
HCT VFR BLD CALC: 31.4 % (ref 42–52)
HCT VFR BLD CALC: 32.5 % (ref 40.5–52.5)
HCT VFR BLD CALC: 32.5 % (ref 42–52)
HCT VFR BLD CALC: 32.5 % (ref 42–52)
HCT VFR BLD CALC: 33.6 % (ref 42–52)
HCT VFR BLD CALC: 34.9 % (ref 42–52)
HCT VFR BLD CALC: 35.4 % (ref 42–52)
HCT VFR BLD CALC: 36.5 % (ref 40.5–52.5)
HDLC SERPL-MCNC: 32 MG/DL (ref 40–60)
HEMOGLOBIN: 10.3 GM/DL (ref 13.5–18)
HEMOGLOBIN: 10.5 G/DL (ref 13.5–17.5)
HEMOGLOBIN: 10.6 GM/DL (ref 13.5–18)
HEMOGLOBIN: 11.2 G/DL (ref 13.5–17.5)
HEMOGLOBIN: 9.3 GM/DL (ref 13.5–18)
HEMOGLOBIN: 9.5 GM/DL (ref 13.5–18)
HEMOGLOBIN: 9.7 GM/DL (ref 13.5–18)
HEMOGLOBIN: 9.9 GM/DL (ref 13.5–18)
HIGH SENSITIVE C-REACTIVE PROTEIN: 108.7 MG/L
HUMAN METAPNEUMOVIRUS PCR: NOT DETECTED
IMMATURE NEUTROPHIL %: 0.5 % (ref 0–0.43)
INFLUENZA A BY PCR: NOT DETECTED
INFLUENZA A H1 (2009) PCR: NOT DETECTED
INFLUENZA A H1 PANDEMIC PCR: NOT DETECTED
INFLUENZA A H3 PCR: NOT DETECTED
INFLUENZA B BY PCR: NOT DETECTED
INR BLD: 1.38 INDEX
INR BLD: 1.39 INDEX
INR BLD: 1.52 INDEX
INR BLD: 1.93 INDEX
INR BLD: 2.43 INDEX
INR BLD: 3.63 INDEX
INR BLD: 3.96 INDEX
LACTATE DEHYDROGENASE, FLUID: 112 IU/L
LDL CHOLESTEROL CALCULATED: 118 MG/DL
LV EF: 53 %
LVEF MODALITY: NORMAL
LYMPHOCYTES ABSOLUTE: 1.3 K/CU MM
LYMPHOCYTES RELATIVE PERCENT: 13.7 % (ref 24–44)
LYMPHOCYTES, BODY FLUID: 86 %
Lab: NORMAL
MCH RBC QN AUTO: 27.1 PG (ref 27–31)
MCH RBC QN AUTO: 27.3 PG (ref 27–31)
MCH RBC QN AUTO: 27.3 PG (ref 27–31)
MCH RBC QN AUTO: 27.5 PG (ref 27–31)
MCH RBC QN AUTO: 27.6 PG (ref 26–34)
MCH RBC QN AUTO: 27.6 PG (ref 27–31)
MCH RBC QN AUTO: 27.7 PG (ref 27–31)
MCH RBC QN AUTO: 30 PG (ref 26–34)
MCHC RBC AUTO-ENTMCNC: 29.2 % (ref 32–36)
MCHC RBC AUTO-ENTMCNC: 29.5 % (ref 32–36)
MCHC RBC AUTO-ENTMCNC: 29.5 % (ref 32–36)
MCHC RBC AUTO-ENTMCNC: 29.6 % (ref 32–36)
MCHC RBC AUTO-ENTMCNC: 29.8 % (ref 32–36)
MCHC RBC AUTO-ENTMCNC: 29.9 % (ref 32–36)
MCHC RBC AUTO-ENTMCNC: 30.7 G/DL (ref 31–36)
MCHC RBC AUTO-ENTMCNC: 32.4 G/DL (ref 31–36)
MCV RBC AUTO: 90.1 FL (ref 80–100)
MCV RBC AUTO: 91.2 FL (ref 78–100)
MCV RBC AUTO: 91.8 FL (ref 78–100)
MCV RBC AUTO: 92.3 FL (ref 78–100)
MCV RBC AUTO: 92.7 FL (ref 80–100)
MCV RBC AUTO: 92.8 FL (ref 78–100)
MCV RBC AUTO: 93.5 FL (ref 78–100)
MCV RBC AUTO: 94.2 FL (ref 78–100)
MESOTHELIAL FLUID: 0 /100 WBC
METHEMOGLOBIN ARTERIAL: 2.7 %
MONOCYTE, FLUID: 12 %
MONOCYTES ABSOLUTE: 0.7 K/CU MM
MONOCYTES RELATIVE PERCENT: 7.3 % (ref 0–4)
MYCOPLASMA PNEUMONIAE PCR: NOT DETECTED
NEUTROPHIL, FLUID: 1 %
NUCLEATED RBC %: 0 %
O2 SATURATION: 89.7 % (ref 96–97)
OTHER CELLS FLUID: NORMAL
PARAINFLUENZA 1 PCR: NOT DETECTED
PARAINFLUENZA 2 PCR: NOT DETECTED
PARAINFLUENZA 3 PCR: NOT DETECTED
PARAINFLUENZA 4 PCR: NOT DETECTED
PCO2 ARTERIAL: 49 MMHG (ref 32–45)
PDW BLD-RTO: 13.8 % (ref 11.7–14.9)
PDW BLD-RTO: 13.9 % (ref 11.7–14.9)
PDW BLD-RTO: 14 % (ref 11.7–14.9)
PDW BLD-RTO: 14.1 % (ref 11.7–14.9)
PDW BLD-RTO: 14.1 % (ref 11.7–14.9)
PDW BLD-RTO: 14.2 % (ref 11.7–14.9)
PDW BLD-RTO: 14.6 % (ref 12.4–15.4)
PDW BLD-RTO: 15.6 % (ref 12.4–15.4)
PH BLOOD: 7.37 (ref 7.34–7.45)
PHOSPHORUS: 3.7 MG/DL (ref 2.5–4.9)
PLATELET # BLD: 195 K/UL (ref 135–450)
PLATELET # BLD: 197 K/CU MM (ref 140–440)
PLATELET # BLD: 220 K/CU MM (ref 140–440)
PLATELET # BLD: 279 K/CU MM (ref 140–440)
PLATELET # BLD: 283 K/CU MM (ref 140–440)
PLATELET # BLD: 287 K/CU MM (ref 140–440)
PLATELET # BLD: 311 K/CU MM (ref 140–440)
PLATELET # BLD: 319 K/UL (ref 135–450)
PLATELET SLIDE REVIEW: ADEQUATE
PMV BLD AUTO: 10 FL (ref 7.5–11.1)
PMV BLD AUTO: 10.1 FL (ref 7.5–11.1)
PMV BLD AUTO: 10.3 FL (ref 7.5–11.1)
PMV BLD AUTO: 10.5 FL (ref 7.5–11.1)
PMV BLD AUTO: 8.1 FL (ref 5–10.5)
PMV BLD AUTO: 8.7 FL (ref 5–10.5)
PMV BLD AUTO: 9.5 FL (ref 7.5–11.1)
PMV BLD AUTO: 9.9 FL (ref 7.5–11.1)
PO2 ARTERIAL: 59 MMHG (ref 75–100)
POTASSIUM SERPL-SCNC: 4.3 MMOL/L (ref 3.5–5.1)
POTASSIUM SERPL-SCNC: 4.4 MMOL/L (ref 3.5–5.1)
POTASSIUM SERPL-SCNC: 4.6 MMOL/L (ref 3.5–5.1)
POTASSIUM SERPL-SCNC: 4.6 MMOL/L (ref 3.5–5.1)
POTASSIUM SERPL-SCNC: 4.7 MMOL/L (ref 3.5–5.1)
POTASSIUM SERPL-SCNC: 4.8 MMOL/L (ref 3.5–5.1)
POTASSIUM SERPL-SCNC: 4.9 MMOL/L (ref 3.5–5.1)
PRO-BNP: 1127 PG/ML
PROCALCITONIN: 0.38
PROTEIN FLUID: 4.5 GM/DL
PROTHROMBIN TIME: 17.9 SECONDS (ref 11.7–14.5)
PROTHROMBIN TIME: 18 SECONDS (ref 11.7–14.5)
PROTHROMBIN TIME: 19.7 SECONDS (ref 11.7–14.5)
PROTHROMBIN TIME: 25.1 SECONDS (ref 11.7–14.5)
PROTHROMBIN TIME: 31.6 SECONDS (ref 11.7–14.5)
PROTHROMBIN TIME: 47.5 SECONDS (ref 11.7–14.5)
PROTHROMBIN TIME: 51.8 SECONDS (ref 11.7–14.5)
RBC # BLD: 3.36 M/CU MM (ref 4.6–6.2)
RBC # BLD: 3.45 M/CU MM (ref 4.6–6.2)
RBC # BLD: 3.5 M/UL (ref 4.2–5.9)
RBC # BLD: 3.52 M/CU MM (ref 4.6–6.2)
RBC # BLD: 3.62 M/CU MM (ref 4.6–6.2)
RBC # BLD: 3.8 M/CU MM (ref 4.6–6.2)
RBC # BLD: 3.88 M/CU MM (ref 4.6–6.2)
RBC # BLD: 4.05 M/UL (ref 4.2–5.9)
RBC FLUID: 350 /CU MM
RHINOVIRUS ENTEROVIRUS PCR: NOT DETECTED
RSV PCR: NOT DETECTED
SARS-COV-2: NOT DETECTED
SEGMENTED NEUTROPHILS ABSOLUTE COUNT: 7 K/CU MM
SEGMENTED NEUTROPHILS RELATIVE PERCENT: 72.1 % (ref 36–66)
SLIDE REVIEW: ABNORMAL
SODIUM BLD-SCNC: 133 MMOL/L (ref 135–145)
SODIUM BLD-SCNC: 133 MMOL/L (ref 135–145)
SODIUM BLD-SCNC: 134 MMOL/L (ref 135–145)
SODIUM BLD-SCNC: 137 MMOL/L (ref 136–145)
SODIUM BLD-SCNC: 138 MMOL/L (ref 135–145)
SODIUM BLD-SCNC: 138 MMOL/L (ref 135–145)
SODIUM BLD-SCNC: 139 MMOL/L (ref 136–145)
SOURCE FLUID: NORMAL
SPECIMEN: NORMAL
TOTAL IMMATURE NEUTOROPHIL: 0.05 K/CU MM
TOTAL NUCLEATED RBC: 0 K/CU MM
TOTAL PROTEIN: 7.2 GM/DL (ref 6.4–8.2)
TOTAL PROTEIN: 7.5 G/DL (ref 6.4–8.2)
TOTAL PROTEIN: 7.7 G/DL (ref 6.4–8.2)
TRIGL SERPL-MCNC: 163 MG/DL (ref 0–150)
TROPONIN T: 0.03 NG/ML
TROPONIN T: 0.05 NG/ML
TROPONIN T: 0.05 NG/ML
VLDLC SERPL CALC-MCNC: 33 MG/DL
WBC # BLD: 5.7 K/UL (ref 4–11)
WBC # BLD: 6.1 K/CU MM (ref 4–10.5)
WBC # BLD: 6.4 K/UL (ref 4–11)
WBC # BLD: 6.5 K/CU MM (ref 4–10.5)
WBC # BLD: 6.6 K/CU MM (ref 4–10.5)
WBC # BLD: 7.3 K/CU MM (ref 4–10.5)
WBC # BLD: 9.6 K/CU MM (ref 4–10.5)
WBC # BLD: 9.7 K/CU MM (ref 4–10.5)
WBC FLUID: 802 /CU MM

## 2021-01-01 PROCEDURE — 82945 GLUCOSE OTHER FLUID: CPT

## 2021-01-01 PROCEDURE — 85027 COMPLETE CBC AUTOMATED: CPT

## 2021-01-01 PROCEDURE — 4040F PNEUMOC VAC/ADMIN/RCVD: CPT | Performed by: FAMILY MEDICINE

## 2021-01-01 PROCEDURE — 85730 THROMBOPLASTIN TIME PARTIAL: CPT

## 2021-01-01 PROCEDURE — 1200000000 HC SEMI PRIVATE

## 2021-01-01 PROCEDURE — 32555 ASPIRATE PLEURA W/ IMAGING: CPT

## 2021-01-01 PROCEDURE — 71045 X-RAY EXAM CHEST 1 VIEW: CPT

## 2021-01-01 PROCEDURE — 6370000000 HC RX 637 (ALT 250 FOR IP): Performed by: INTERNAL MEDICINE

## 2021-01-01 PROCEDURE — 6360000002 HC RX W HCPCS: Performed by: STUDENT IN AN ORGANIZED HEALTH CARE EDUCATION/TRAINING PROGRAM

## 2021-01-01 PROCEDURE — 99232 SBSQ HOSP IP/OBS MODERATE 35: CPT | Performed by: INTERNAL MEDICINE

## 2021-01-01 PROCEDURE — 6370000000 HC RX 637 (ALT 250 FOR IP): Performed by: STUDENT IN AN ORGANIZED HEALTH CARE EDUCATION/TRAINING PROGRAM

## 2021-01-01 PROCEDURE — 85025 COMPLETE CBC W/AUTO DIFF WBC: CPT

## 2021-01-01 PROCEDURE — 82962 GLUCOSE BLOOD TEST: CPT

## 2021-01-01 PROCEDURE — A9540 TC99M MAA: HCPCS | Performed by: STUDENT IN AN ORGANIZED HEALTH CARE EDUCATION/TRAINING PROGRAM

## 2021-01-01 PROCEDURE — 36415 COLL VENOUS BLD VENIPUNCTURE: CPT

## 2021-01-01 PROCEDURE — 85610 PROTHROMBIN TIME: CPT

## 2021-01-01 PROCEDURE — 83615 LACTATE (LD) (LDH) ENZYME: CPT

## 2021-01-01 PROCEDURE — 84100 ASSAY OF PHOSPHORUS: CPT

## 2021-01-01 PROCEDURE — 94761 N-INVAS EAR/PLS OXIMETRY MLT: CPT

## 2021-01-01 PROCEDURE — 80048 BASIC METABOLIC PNL TOTAL CA: CPT

## 2021-01-01 PROCEDURE — G8427 DOCREV CUR MEDS BY ELIG CLIN: HCPCS | Performed by: FAMILY MEDICINE

## 2021-01-01 PROCEDURE — 6360000002 HC RX W HCPCS: Performed by: EMERGENCY MEDICINE

## 2021-01-01 PROCEDURE — 99222 1ST HOSP IP/OBS MODERATE 55: CPT | Performed by: INTERNAL MEDICINE

## 2021-01-01 PROCEDURE — 1036F TOBACCO NON-USER: CPT | Performed by: FAMILY MEDICINE

## 2021-01-01 PROCEDURE — 92526 ORAL FUNCTION THERAPY: CPT

## 2021-01-01 PROCEDURE — 99221 1ST HOSP IP/OBS SF/LOW 40: CPT | Performed by: INTERNAL MEDICINE

## 2021-01-01 PROCEDURE — 99231 SBSQ HOSP IP/OBS SF/LOW 25: CPT | Performed by: INTERNAL MEDICINE

## 2021-01-01 PROCEDURE — G8417 CALC BMI ABV UP PARAM F/U: HCPCS | Performed by: FAMILY MEDICINE

## 2021-01-01 PROCEDURE — 88305 TISSUE EXAM BY PATHOLOGIST: CPT

## 2021-01-01 PROCEDURE — 93880 EXTRACRANIAL BILAT STUDY: CPT

## 2021-01-01 PROCEDURE — 99213 OFFICE O/P EST LOW 20 MIN: CPT | Performed by: FAMILY MEDICINE

## 2021-01-01 PROCEDURE — 2700000000 HC OXYGEN THERAPY PER DAY

## 2021-01-01 PROCEDURE — 92610 EVALUATE SWALLOWING FUNCTION: CPT

## 2021-01-01 PROCEDURE — 3052F HG A1C>EQUAL 8.0%<EQUAL 9.0%: CPT | Performed by: FAMILY MEDICINE

## 2021-01-01 PROCEDURE — 83036 HEMOGLOBIN GLYCOSYLATED A1C: CPT

## 2021-01-01 PROCEDURE — G8484 FLU IMMUNIZE NO ADMIN: HCPCS | Performed by: FAMILY MEDICINE

## 2021-01-01 PROCEDURE — 82042 OTHER SOURCE ALBUMIN QUAN EA: CPT

## 2021-01-01 PROCEDURE — 3430000000 HC RX DIAGNOSTIC RADIOPHARMACEUTICAL: Performed by: STUDENT IN AN ORGANIZED HEALTH CARE EDUCATION/TRAINING PROGRAM

## 2021-01-01 PROCEDURE — 2709999900 HC NON-CHARGEABLE SUPPLY

## 2021-01-01 PROCEDURE — 86141 C-REACTIVE PROTEIN HS: CPT

## 2021-01-01 PROCEDURE — 93970 EXTREMITY STUDY: CPT

## 2021-01-01 PROCEDURE — 93010 ELECTROCARDIOGRAM REPORT: CPT | Performed by: INTERNAL MEDICINE

## 2021-01-01 PROCEDURE — 1111F DSCHRG MED/CURRENT MED MERGE: CPT | Performed by: FAMILY MEDICINE

## 2021-01-01 PROCEDURE — 2580000003 HC RX 258: Performed by: STUDENT IN AN ORGANIZED HEALTH CARE EDUCATION/TRAINING PROGRAM

## 2021-01-01 PROCEDURE — C1729 CATH, DRAINAGE: HCPCS

## 2021-01-01 PROCEDURE — 89051 BODY FLUID CELL COUNT: CPT

## 2021-01-01 PROCEDURE — 2500000003 HC RX 250 WO HCPCS: Performed by: STUDENT IN AN ORGANIZED HEALTH CARE EDUCATION/TRAINING PROGRAM

## 2021-01-01 PROCEDURE — 84484 ASSAY OF TROPONIN QUANT: CPT

## 2021-01-01 PROCEDURE — 85610 PROTHROMBIN TIME: CPT | Performed by: FAMILY MEDICINE

## 2021-01-01 PROCEDURE — 99214 OFFICE O/P EST MOD 30 MIN: CPT | Performed by: FAMILY MEDICINE

## 2021-01-01 PROCEDURE — 82150 ASSAY OF AMYLASE: CPT

## 2021-01-01 PROCEDURE — 88108 CYTOPATH CONCENTRATE TECH: CPT

## 2021-01-01 PROCEDURE — 80053 COMPREHEN METABOLIC PANEL: CPT

## 2021-01-01 PROCEDURE — 1123F ACP DISCUSS/DSCN MKR DOCD: CPT | Performed by: FAMILY MEDICINE

## 2021-01-01 PROCEDURE — 87070 CULTURE OTHR SPECIMN AEROBIC: CPT

## 2021-01-01 PROCEDURE — 93306 TTE W/DOPPLER COMPLETE: CPT

## 2021-01-01 PROCEDURE — 36600 WITHDRAWAL OF ARTERIAL BLOOD: CPT

## 2021-01-01 PROCEDURE — 78582 LUNG VENTILAT&PERFUS IMAGING: CPT

## 2021-01-01 PROCEDURE — 87040 BLOOD CULTURE FOR BACTERIA: CPT

## 2021-01-01 PROCEDURE — 83880 ASSAY OF NATRIURETIC PEPTIDE: CPT

## 2021-01-01 PROCEDURE — 71250 CT THORAX DX C-: CPT

## 2021-01-01 PROCEDURE — 0W9B3ZZ DRAINAGE OF LEFT PLEURAL CAVITY, PERCUTANEOUS APPROACH: ICD-10-PCS | Performed by: RADIOLOGY

## 2021-01-01 PROCEDURE — 0202U NFCT DS 22 TRGT SARS-COV-2: CPT

## 2021-01-01 PROCEDURE — 93005 ELECTROCARDIOGRAM TRACING: CPT | Performed by: EMERGENCY MEDICINE

## 2021-01-01 PROCEDURE — 99282 EMERGENCY DEPT VISIT SF MDM: CPT

## 2021-01-01 PROCEDURE — APPSS45 APP SPLIT SHARED TIME 31-45 MINUTES: Performed by: NURSE PRACTITIONER

## 2021-01-01 PROCEDURE — 84145 PROCALCITONIN (PCT): CPT

## 2021-01-01 PROCEDURE — A9539 TC99M PENTETATE: HCPCS | Performed by: STUDENT IN AN ORGANIZED HEALTH CARE EDUCATION/TRAINING PROGRAM

## 2021-01-01 PROCEDURE — 2580000003 HC RX 258: Performed by: INTERNAL MEDICINE

## 2021-01-01 PROCEDURE — 96374 THER/PROPH/DIAG INJ IV PUSH: CPT

## 2021-01-01 PROCEDURE — 84157 ASSAY OF PROTEIN OTHER: CPT

## 2021-01-01 PROCEDURE — 85379 FIBRIN DEGRADATION QUANT: CPT

## 2021-01-01 PROCEDURE — 87205 SMEAR GRAM STAIN: CPT

## 2021-01-01 PROCEDURE — 82803 BLOOD GASES ANY COMBINATION: CPT

## 2021-01-01 RX ORDER — SODIUM CHLORIDE 0.9 % (FLUSH) 0.9 %
5-40 SYRINGE (ML) INJECTION PRN
Status: DISCONTINUED | OUTPATIENT
Start: 2021-01-01 | End: 2021-01-01 | Stop reason: HOSPADM

## 2021-01-01 RX ORDER — SODIUM CHLORIDE 0.9 % (FLUSH) 0.9 %
5-40 SYRINGE (ML) INJECTION EVERY 12 HOURS SCHEDULED
Status: DISCONTINUED | OUTPATIENT
Start: 2021-01-01 | End: 2021-01-01 | Stop reason: HOSPADM

## 2021-01-01 RX ORDER — INSULIN DEGLUDEC 200 U/ML
25 INJECTION, SOLUTION SUBCUTANEOUS NIGHTLY
Qty: 3 PEN | Refills: 2 | Status: SHIPPED | OUTPATIENT
Start: 2021-01-01

## 2021-01-01 RX ORDER — INSULIN ASPART 100 [IU]/ML
8 INJECTION, SOLUTION INTRAVENOUS; SUBCUTANEOUS
Qty: 5 PEN | Refills: 1 | Status: SHIPPED | OUTPATIENT
Start: 2021-01-01

## 2021-01-01 RX ORDER — FUROSEMIDE 20 MG/1
40 TABLET ORAL EVERY MORNING
Qty: 90 TABLET | Refills: 1 | Status: SHIPPED | OUTPATIENT
Start: 2021-01-01 | End: 2022-01-01 | Stop reason: SDUPTHER

## 2021-01-01 RX ORDER — HEPARIN SODIUM 10000 [USP'U]/100ML
5-30 INJECTION, SOLUTION INTRAVENOUS CONTINUOUS
Status: DISCONTINUED | OUTPATIENT
Start: 2021-01-01 | End: 2021-01-01

## 2021-01-01 RX ORDER — HEPARIN SODIUM 1000 [USP'U]/ML
80 INJECTION, SOLUTION INTRAVENOUS; SUBCUTANEOUS ONCE
Status: COMPLETED | OUTPATIENT
Start: 2021-01-01 | End: 2021-01-01

## 2021-01-01 RX ORDER — PANTOPRAZOLE SODIUM 40 MG/1
40 TABLET, DELAYED RELEASE ORAL DAILY
COMMUNITY
End: 2021-01-01

## 2021-01-01 RX ORDER — KIT FOR THE PREPARATION OF TECHNETIUM TC 99M PENTETATE 20 MG/1
39.3 INJECTION, POWDER, LYOPHILIZED, FOR SOLUTION INTRAVENOUS; RESPIRATORY (INHALATION)
Status: COMPLETED | OUTPATIENT
Start: 2021-01-01 | End: 2021-01-01

## 2021-01-01 RX ORDER — ATENOLOL 50 MG/1
50 TABLET ORAL DAILY
Qty: 90 TABLET | Refills: 1 | Status: CANCELLED | OUTPATIENT
Start: 2021-01-01

## 2021-01-01 RX ORDER — GLIMEPIRIDE 4 MG/1
4 TABLET ORAL
Qty: 90 TABLET | Refills: 1 | Status: SHIPPED | OUTPATIENT
Start: 2021-01-01 | End: 2022-01-01

## 2021-01-01 RX ORDER — WARFARIN SODIUM 5 MG/1
5 TABLET ORAL DAILY
Status: DISCONTINUED | OUTPATIENT
Start: 2021-01-01 | End: 2021-01-01

## 2021-01-01 RX ORDER — FUROSEMIDE 40 MG/1
40 TABLET ORAL DAILY
Status: DISCONTINUED | OUTPATIENT
Start: 2021-01-01 | End: 2021-01-01 | Stop reason: HOSPADM

## 2021-01-01 RX ORDER — ATENOLOL 50 MG/1
50 TABLET ORAL DAILY
Qty: 90 TABLET | Refills: 0 | Status: SHIPPED | OUTPATIENT
Start: 2021-01-01 | End: 2021-01-01 | Stop reason: SDUPTHER

## 2021-01-01 RX ORDER — WARFARIN SODIUM 5 MG/1
TABLET ORAL
Qty: 20 TABLET | Refills: 0
Start: 2021-01-01

## 2021-01-01 RX ORDER — OXYCODONE AND ACETAMINOPHEN 7.5; 325 MG/1; MG/1
1 TABLET ORAL 2 TIMES DAILY PRN
Status: DISCONTINUED | OUTPATIENT
Start: 2021-01-01 | End: 2021-01-01 | Stop reason: HOSPADM

## 2021-01-01 RX ORDER — PANTOPRAZOLE SODIUM 40 MG/1
40 TABLET, DELAYED RELEASE ORAL DAILY
Status: DISCONTINUED | OUTPATIENT
Start: 2021-01-01 | End: 2021-01-01 | Stop reason: HOSPADM

## 2021-01-01 RX ORDER — ACETAMINOPHEN 650 MG/1
650 SUPPOSITORY RECTAL EVERY 6 HOURS PRN
Status: DISCONTINUED | OUTPATIENT
Start: 2021-01-01 | End: 2021-01-01 | Stop reason: HOSPADM

## 2021-01-01 RX ORDER — POLYETHYLENE GLYCOL 3350 17 G/17G
17 POWDER, FOR SOLUTION ORAL DAILY PRN
Status: DISCONTINUED | OUTPATIENT
Start: 2021-01-01 | End: 2021-01-01 | Stop reason: HOSPADM

## 2021-01-01 RX ORDER — HEPARIN SODIUM 1000 [USP'U]/ML
40 INJECTION, SOLUTION INTRAVENOUS; SUBCUTANEOUS PRN
Status: DISCONTINUED | OUTPATIENT
Start: 2021-01-01 | End: 2021-01-01

## 2021-01-01 RX ORDER — NICOTINE POLACRILEX 4 MG
15 LOZENGE BUCCAL PRN
Status: DISCONTINUED | OUTPATIENT
Start: 2021-01-01 | End: 2021-01-01 | Stop reason: HOSPADM

## 2021-01-01 RX ORDER — WARFARIN SODIUM 5 MG/1
5 TABLET ORAL DAILY
Status: DISCONTINUED | OUTPATIENT
Start: 2021-01-01 | End: 2021-01-01 | Stop reason: DRUGHIGH

## 2021-01-01 RX ORDER — CILOSTAZOL 50 MG/1
50 TABLET ORAL 2 TIMES DAILY
Status: DISCONTINUED | OUTPATIENT
Start: 2021-01-01 | End: 2021-01-01 | Stop reason: HOSPADM

## 2021-01-01 RX ORDER — WARFARIN SODIUM 6 MG/1
6 TABLET ORAL
Status: COMPLETED | OUTPATIENT
Start: 2021-01-01 | End: 2021-01-01

## 2021-01-01 RX ORDER — FUROSEMIDE 20 MG/1
60 TABLET ORAL DAILY
Status: DISCONTINUED | OUTPATIENT
Start: 2021-01-01 | End: 2021-01-01

## 2021-01-01 RX ORDER — WARFARIN SODIUM 5 MG/1
TABLET ORAL
Qty: 60 TABLET | Refills: 1 | Status: ON HOLD | OUTPATIENT
Start: 2021-01-01 | End: 2021-01-01 | Stop reason: SDUPTHER

## 2021-01-01 RX ORDER — FUROSEMIDE 40 MG/1
40 TABLET ORAL EVERY MORNING
Qty: 90 TABLET | Refills: 0 | Status: ON HOLD | OUTPATIENT
Start: 2021-01-01 | End: 2021-01-01 | Stop reason: HOSPADM

## 2021-01-01 RX ORDER — AMOXICILLIN AND CLAVULANATE POTASSIUM 250; 125 MG/1; MG/1
1 TABLET, FILM COATED ORAL 2 TIMES DAILY
Qty: 6 TABLET | Refills: 0 | Status: SHIPPED | OUTPATIENT
Start: 2021-01-01 | End: 2021-01-01

## 2021-01-01 RX ORDER — HEPARIN SODIUM 1000 [USP'U]/ML
80 INJECTION, SOLUTION INTRAVENOUS; SUBCUTANEOUS PRN
Status: DISCONTINUED | OUTPATIENT
Start: 2021-01-01 | End: 2021-01-01

## 2021-01-01 RX ORDER — GLIPIZIDE 5 MG/1
5 TABLET ORAL
Status: DISCONTINUED | OUTPATIENT
Start: 2021-01-01 | End: 2021-01-01 | Stop reason: HOSPADM

## 2021-01-01 RX ORDER — AZITHROMYCIN 250 MG/1
250 TABLET, FILM COATED ORAL DAILY
Status: COMPLETED | OUTPATIENT
Start: 2021-01-01 | End: 2021-01-01

## 2021-01-01 RX ORDER — FUROSEMIDE 20 MG/1
20 TABLET ORAL EVERY MORNING
Qty: 90 TABLET | Refills: 1 | Status: ON HOLD | OUTPATIENT
Start: 2021-01-01 | End: 2021-01-01 | Stop reason: SDUPTHER

## 2021-01-01 RX ORDER — ATORVASTATIN CALCIUM 80 MG/1
80 TABLET, FILM COATED ORAL DAILY
Qty: 90 TABLET | Refills: 1 | Status: SHIPPED | OUTPATIENT
Start: 2021-01-01

## 2021-01-01 RX ORDER — DEXTROSE MONOHYDRATE 50 MG/ML
100 INJECTION, SOLUTION INTRAVENOUS PRN
Status: DISCONTINUED | OUTPATIENT
Start: 2021-01-01 | End: 2021-01-01 | Stop reason: HOSPADM

## 2021-01-01 RX ORDER — GLIMEPIRIDE 4 MG/1
4 TABLET ORAL
COMMUNITY
End: 2021-01-01 | Stop reason: SDUPTHER

## 2021-01-01 RX ORDER — ONDANSETRON 4 MG/1
4 TABLET, ORALLY DISINTEGRATING ORAL EVERY 8 HOURS PRN
Status: DISCONTINUED | OUTPATIENT
Start: 2021-01-01 | End: 2021-01-01 | Stop reason: HOSPADM

## 2021-01-01 RX ORDER — GLIMEPIRIDE 4 MG/1
4 TABLET ORAL
Qty: 90 TABLET | Refills: 0 | Status: SHIPPED | OUTPATIENT
Start: 2021-01-01 | End: 2021-01-01 | Stop reason: SDUPTHER

## 2021-01-01 RX ORDER — DEXTROSE MONOHYDRATE 25 G/50ML
12.5 INJECTION, SOLUTION INTRAVENOUS PRN
Status: DISCONTINUED | OUTPATIENT
Start: 2021-01-01 | End: 2021-01-01 | Stop reason: HOSPADM

## 2021-01-01 RX ORDER — INSULIN GLARGINE 100 [IU]/ML
25 INJECTION, SOLUTION SUBCUTANEOUS NIGHTLY
Status: DISCONTINUED | OUTPATIENT
Start: 2021-01-01 | End: 2021-01-01

## 2021-01-01 RX ORDER — FUROSEMIDE 10 MG/ML
40 INJECTION INTRAMUSCULAR; INTRAVENOUS ONCE
Status: COMPLETED | OUTPATIENT
Start: 2021-01-01 | End: 2021-01-01

## 2021-01-01 RX ORDER — FLASH GLUCOSE SENSOR
KIT MISCELLANEOUS
Qty: 1 EACH | Refills: 0 | Status: SHIPPED | OUTPATIENT
Start: 2021-01-01

## 2021-01-01 RX ORDER — WARFARIN SODIUM 4 MG/1
4 TABLET ORAL
Status: COMPLETED | OUTPATIENT
Start: 2021-01-01 | End: 2021-01-01

## 2021-01-01 RX ORDER — FLASH GLUCOSE SENSOR
KIT MISCELLANEOUS
Qty: 2 EACH | Refills: 5 | Status: SHIPPED | OUTPATIENT
Start: 2021-01-01

## 2021-01-01 RX ORDER — SODIUM CHLORIDE 9 MG/ML
INJECTION, SOLUTION INTRAVENOUS CONTINUOUS
Status: DISCONTINUED | OUTPATIENT
Start: 2021-01-01 | End: 2021-01-01

## 2021-01-01 RX ORDER — AMOXICILLIN AND CLAVULANATE POTASSIUM 875; 125 MG/1; MG/1
1 TABLET, FILM COATED ORAL 2 TIMES DAILY
Qty: 14 TABLET | Refills: 0 | Status: SHIPPED | OUTPATIENT
Start: 2021-01-01 | End: 2022-01-01

## 2021-01-01 RX ORDER — CILOSTAZOL 50 MG/1
50 TABLET ORAL 2 TIMES DAILY
Qty: 180 TABLET | Refills: 1 | Status: SHIPPED | OUTPATIENT
Start: 2021-01-01

## 2021-01-01 RX ORDER — ATENOLOL 50 MG/1
50 TABLET ORAL DAILY
Qty: 90 TABLET | Refills: 1 | Status: SHIPPED | OUTPATIENT
Start: 2021-01-01

## 2021-01-01 RX ORDER — ACETAMINOPHEN 325 MG/1
650 TABLET ORAL EVERY 6 HOURS PRN
Status: DISCONTINUED | OUTPATIENT
Start: 2021-01-01 | End: 2021-01-01 | Stop reason: HOSPADM

## 2021-01-01 RX ORDER — ONDANSETRON 2 MG/ML
4 INJECTION INTRAMUSCULAR; INTRAVENOUS EVERY 6 HOURS PRN
Status: DISCONTINUED | OUTPATIENT
Start: 2021-01-01 | End: 2021-01-01 | Stop reason: HOSPADM

## 2021-01-01 RX ORDER — SODIUM CHLORIDE 9 MG/ML
25 INJECTION, SOLUTION INTRAVENOUS PRN
Status: DISCONTINUED | OUTPATIENT
Start: 2021-01-01 | End: 2021-01-01 | Stop reason: HOSPADM

## 2021-01-01 RX ORDER — GLIMEPIRIDE 4 MG/1
4 TABLET ORAL
Qty: 90 TABLET | Refills: 0 | Status: ON HOLD | OUTPATIENT
Start: 2021-01-01 | End: 2021-01-01 | Stop reason: HOSPADM

## 2021-01-01 RX ORDER — INSULIN DEGLUDEC 200 U/ML
25 INJECTION, SOLUTION SUBCUTANEOUS NIGHTLY
Qty: 3 PEN | Refills: 2 | Status: SHIPPED | OUTPATIENT
Start: 2021-01-01 | End: 2021-01-01 | Stop reason: SDUPTHER

## 2021-01-01 RX ADMIN — SODIUM CHLORIDE, PRESERVATIVE FREE 10 ML: 5 INJECTION INTRAVENOUS at 20:49

## 2021-01-01 RX ADMIN — FUROSEMIDE 40 MG: 10 INJECTION, SOLUTION INTRAMUSCULAR; INTRAVENOUS at 05:58

## 2021-01-01 RX ADMIN — Medication 5.9 MILLICURIE: at 11:00

## 2021-01-01 RX ADMIN — SODIUM CHLORIDE: 9 INJECTION, SOLUTION INTRAVENOUS at 07:56

## 2021-01-01 RX ADMIN — CILOSTAZOL 50 MG: 50 TABLET ORAL at 22:35

## 2021-01-01 RX ADMIN — PANTOPRAZOLE SODIUM 40 MG: 40 TABLET, DELAYED RELEASE ORAL at 08:13

## 2021-01-01 RX ADMIN — OXYCODONE AND ACETAMINOPHEN 1 TABLET: 7.5; 325 TABLET ORAL at 21:33

## 2021-01-01 RX ADMIN — CILOSTAZOL 50 MG: 50 TABLET ORAL at 20:48

## 2021-01-01 RX ADMIN — CILOSTAZOL 50 MG: 50 TABLET ORAL at 10:55

## 2021-01-01 RX ADMIN — AZITHROMYCIN MONOHYDRATE 250 MG: 250 TABLET ORAL at 10:56

## 2021-01-01 RX ADMIN — GLIPIZIDE 5 MG: 5 TABLET ORAL at 06:25

## 2021-01-01 RX ADMIN — PANTOPRAZOLE SODIUM 40 MG: 40 TABLET, DELAYED RELEASE ORAL at 08:27

## 2021-01-01 RX ADMIN — SODIUM CHLORIDE, PRESERVATIVE FREE 10 ML: 5 INJECTION INTRAVENOUS at 08:31

## 2021-01-01 RX ADMIN — HEPARIN SODIUM 14.71 UNITS/KG/HR: 10000 INJECTION, SOLUTION INTRAVENOUS at 12:01

## 2021-01-01 RX ADMIN — CILOSTAZOL 50 MG: 50 TABLET ORAL at 08:13

## 2021-01-01 RX ADMIN — HEPARIN SODIUM 14.71 UNITS/KG/HR: 10000 INJECTION, SOLUTION INTRAVENOUS at 12:04

## 2021-01-01 RX ADMIN — CILOSTAZOL 50 MG: 50 TABLET ORAL at 20:24

## 2021-01-01 RX ADMIN — WARFARIN SODIUM 5 MG: 5 TABLET ORAL at 18:01

## 2021-01-01 RX ADMIN — GLIPIZIDE 5 MG: 5 TABLET ORAL at 06:44

## 2021-01-01 RX ADMIN — FUROSEMIDE 60 MG: 20 TABLET ORAL at 09:04

## 2021-01-01 RX ADMIN — INSULIN LISPRO 1 UNITS: 100 INJECTION, SOLUTION INTRAVENOUS; SUBCUTANEOUS at 20:23

## 2021-01-01 RX ADMIN — AZITHROMYCIN MONOHYDRATE 250 MG: 250 TABLET ORAL at 08:13

## 2021-01-01 RX ADMIN — PANTOPRAZOLE SODIUM 40 MG: 40 TABLET, DELAYED RELEASE ORAL at 09:27

## 2021-01-01 RX ADMIN — FUROSEMIDE 60 MG: 20 TABLET ORAL at 08:02

## 2021-01-01 RX ADMIN — FUROSEMIDE 60 MG: 20 TABLET ORAL at 10:56

## 2021-01-01 RX ADMIN — CILOSTAZOL 50 MG: 50 TABLET ORAL at 09:27

## 2021-01-01 RX ADMIN — INSULIN GLARGINE 25 UNITS: 100 INJECTION, SOLUTION SUBCUTANEOUS at 22:18

## 2021-01-01 RX ADMIN — SODIUM CHLORIDE, PRESERVATIVE FREE 10 ML: 5 INJECTION INTRAVENOUS at 21:31

## 2021-01-01 RX ADMIN — HEPARIN SODIUM 8 UNITS/KG/HR: 10000 INJECTION, SOLUTION INTRAVENOUS at 15:04

## 2021-01-01 RX ADMIN — POLYETHYLENE GLYCOL (3350) 17 G: 17 POWDER, FOR SOLUTION ORAL at 18:01

## 2021-01-01 RX ADMIN — SODIUM CHLORIDE, PRESERVATIVE FREE 10 ML: 5 INJECTION INTRAVENOUS at 09:33

## 2021-01-01 RX ADMIN — CILOSTAZOL 50 MG: 50 TABLET ORAL at 21:16

## 2021-01-01 RX ADMIN — FUROSEMIDE 40 MG: 40 TABLET ORAL at 08:27

## 2021-01-01 RX ADMIN — CILOSTAZOL 50 MG: 50 TABLET ORAL at 21:33

## 2021-01-01 RX ADMIN — WARFARIN SODIUM 5 MG: 5 TABLET ORAL at 17:52

## 2021-01-01 RX ADMIN — GLIPIZIDE 5 MG: 5 TABLET ORAL at 07:43

## 2021-01-01 RX ADMIN — FUROSEMIDE 60 MG: 20 TABLET ORAL at 08:13

## 2021-01-01 RX ADMIN — CILOSTAZOL 50 MG: 50 TABLET ORAL at 08:49

## 2021-01-01 RX ADMIN — CILOSTAZOL 50 MG: 50 TABLET ORAL at 21:31

## 2021-01-01 RX ADMIN — CILOSTAZOL 50 MG: 50 TABLET ORAL at 08:27

## 2021-01-01 RX ADMIN — HEPARIN SODIUM 18 UNITS/KG/HR: 10000 INJECTION, SOLUTION INTRAVENOUS at 09:27

## 2021-01-01 RX ADMIN — AZITHROMYCIN MONOHYDRATE 250 MG: 250 TABLET ORAL at 16:55

## 2021-01-01 RX ADMIN — PANTOPRAZOLE SODIUM 40 MG: 40 TABLET, DELAYED RELEASE ORAL at 08:49

## 2021-01-01 RX ADMIN — HEPARIN SODIUM 14 UNITS/KG/HR: 10000 INJECTION, SOLUTION INTRAVENOUS at 09:02

## 2021-01-01 RX ADMIN — PANTOPRAZOLE SODIUM 40 MG: 40 TABLET, DELAYED RELEASE ORAL at 10:56

## 2021-01-01 RX ADMIN — SODIUM CHLORIDE, PRESERVATIVE FREE 10 ML: 5 INJECTION INTRAVENOUS at 08:49

## 2021-01-01 RX ADMIN — HEPARIN SODIUM 8 UNITS/KG/HR: 10000 INJECTION, SOLUTION INTRAVENOUS at 03:01

## 2021-01-01 RX ADMIN — WARFARIN SODIUM 6 MG: 6 TABLET ORAL at 16:55

## 2021-01-01 RX ADMIN — INSULIN LISPRO 1 UNITS: 100 INJECTION, SOLUTION INTRAVENOUS; SUBCUTANEOUS at 21:32

## 2021-01-01 RX ADMIN — SODIUM CHLORIDE, PRESERVATIVE FREE 10 ML: 5 INJECTION INTRAVENOUS at 21:16

## 2021-01-01 RX ADMIN — AZITHROMYCIN MONOHYDRATE 250 MG: 250 TABLET ORAL at 08:27

## 2021-01-01 RX ADMIN — WARFARIN SODIUM 4 MG: 4 TABLET ORAL at 18:12

## 2021-01-01 RX ADMIN — KIT FOR THE PREPARATION OF TECHNETIUM TC 99M PENTETATE 39.3 MILLICURIE: 20 INJECTION, POWDER, LYOPHILIZED, FOR SOLUTION INTRAVENOUS; RESPIRATORY (INHALATION) at 10:45

## 2021-01-01 RX ADMIN — AZITHROMYCIN MONOHYDRATE 250 MG: 250 TABLET ORAL at 08:49

## 2021-01-01 RX ADMIN — FUROSEMIDE 60 MG: 20 TABLET ORAL at 08:49

## 2021-01-01 RX ADMIN — SODIUM CHLORIDE, PRESERVATIVE FREE 10 ML: 5 INJECTION INTRAVENOUS at 10:56

## 2021-01-01 RX ADMIN — CILOSTAZOL 50 MG: 50 TABLET ORAL at 09:04

## 2021-01-01 RX ADMIN — HEPARIN SODIUM 6530 UNITS: 1000 INJECTION INTRAVENOUS; SUBCUTANEOUS at 16:37

## 2021-01-01 RX ADMIN — PANTOPRAZOLE SODIUM 40 MG: 40 TABLET, DELAYED RELEASE ORAL at 09:04

## 2021-01-01 RX ADMIN — SODIUM CHLORIDE, PRESERVATIVE FREE 10 ML: 5 INJECTION INTRAVENOUS at 08:19

## 2021-01-01 RX ADMIN — PANTOPRAZOLE SODIUM 40 MG: 40 TABLET, DELAYED RELEASE ORAL at 08:02

## 2021-01-01 RX ADMIN — INSULIN LISPRO 1 UNITS: 100 INJECTION, SOLUTION INTRAVENOUS; SUBCUTANEOUS at 22:19

## 2021-01-01 RX ADMIN — WARFARIN SODIUM 5 MG: 5 TABLET ORAL at 18:42

## 2021-01-01 RX ADMIN — GLIPIZIDE 5 MG: 5 TABLET ORAL at 10:56

## 2021-01-01 RX ADMIN — CILOSTAZOL 50 MG: 50 TABLET ORAL at 08:02

## 2021-01-01 RX ADMIN — HEPARIN SODIUM 6530 UNITS: 1000 INJECTION INTRAVENOUS; SUBCUTANEOUS at 09:17

## 2021-01-01 SDOH — ECONOMIC STABILITY: FOOD INSECURITY: WITHIN THE PAST 12 MONTHS, THE FOOD YOU BOUGHT JUST DIDN'T LAST AND YOU DIDN'T HAVE MONEY TO GET MORE.: NEVER TRUE

## 2021-01-01 SDOH — ECONOMIC STABILITY: FOOD INSECURITY: WITHIN THE PAST 12 MONTHS, YOU WORRIED THAT YOUR FOOD WOULD RUN OUT BEFORE YOU GOT MONEY TO BUY MORE.: NEVER TRUE

## 2021-01-01 ASSESSMENT — ENCOUNTER SYMPTOMS
SHORTNESS OF BREATH: 1
VOMITING: 0
BACK PAIN: 0
BLOOD IN STOOL: 0
VOMITING: 0
WHEEZING: 0
TROUBLE SWALLOWING: 0
EYE PAIN: 0
SHORTNESS OF BREATH: 1
NAUSEA: 0
DIARRHEA: 0
RHINORRHEA: 0
ANAL BLEEDING: 0
WHEEZING: 0
ABDOMINAL PAIN: 0
SHORTNESS OF BREATH: 0
CONSTIPATION: 0
DIARRHEA: 0
EYE PAIN: 0
DIARRHEA: 0
VOMITING: 0
BLOOD IN STOOL: 0
SORE THROAT: 0
WHEEZING: 0
COUGH: 0
SHORTNESS OF BREATH: 1
ABDOMINAL PAIN: 0
CHEST TIGHTNESS: 0
ABDOMINAL PAIN: 0
EYE PAIN: 0
TROUBLE SWALLOWING: 0
SHORTNESS OF BREATH: 0
ABDOMINAL PAIN: 0
NAUSEA: 0
RHINORRHEA: 0
WHEEZING: 0
DIARRHEA: 0
CHEST TIGHTNESS: 0
COUGH: 1
VOMITING: 0
CHEST TIGHTNESS: 0
NAUSEA: 0
BLOOD IN STOOL: 0
NAUSEA: 0
NAUSEA: 0
SORE THROAT: 0
COLOR CHANGE: 0
CHEST TIGHTNESS: 1
EYE REDNESS: 0
COUGH: 0
TROUBLE SWALLOWING: 0
ABDOMINAL PAIN: 0
VOMITING: 0
BLOOD IN STOOL: 0

## 2021-01-01 ASSESSMENT — PAIN SCALES - GENERAL
PAINLEVEL_OUTOF10: 0
PAINLEVEL_OUTOF10: 6
PAINLEVEL_OUTOF10: 0
PAINLEVEL_OUTOF10: 8
PAINLEVEL_OUTOF10: 0
PAINLEVEL_OUTOF10: 0

## 2021-01-01 ASSESSMENT — SOCIAL DETERMINANTS OF HEALTH (SDOH): HOW HARD IS IT FOR YOU TO PAY FOR THE VERY BASICS LIKE FOOD, HOUSING, MEDICAL CARE, AND HEATING?: NOT HARD AT ALL

## 2021-02-16 DIAGNOSIS — I10 HYPERTENSION, UNSPECIFIED TYPE: ICD-10-CM

## 2021-02-16 DIAGNOSIS — E11.9 TYPE 2 DIABETES MELLITUS WITHOUT COMPLICATION, WITHOUT LONG-TERM CURRENT USE OF INSULIN (HCC): ICD-10-CM

## 2021-02-16 RX ORDER — FUROSEMIDE 40 MG/1
40 TABLET ORAL EVERY MORNING
Qty: 90 TABLET | Refills: 0 | Status: SHIPPED | OUTPATIENT
Start: 2021-02-16 | End: 2021-01-01 | Stop reason: SDUPTHER

## 2021-02-16 RX ORDER — GLIMEPIRIDE 4 MG/1
4 TABLET ORAL
Qty: 90 TABLET | Refills: 0 | Status: SHIPPED | OUTPATIENT
Start: 2021-02-16 | End: 2021-01-01 | Stop reason: SDUPTHER

## 2021-03-01 ENCOUNTER — OFFICE VISIT (OUTPATIENT)
Dept: FAMILY MEDICINE CLINIC | Age: 86
End: 2021-03-01
Payer: MEDICARE

## 2021-03-01 VITALS
WEIGHT: 190 LBS | BODY MASS INDEX: 28.14 KG/M2 | HEART RATE: 71 BPM | DIASTOLIC BLOOD PRESSURE: 70 MMHG | HEIGHT: 69 IN | SYSTOLIC BLOOD PRESSURE: 130 MMHG | OXYGEN SATURATION: 92 % | TEMPERATURE: 98 F

## 2021-03-01 DIAGNOSIS — C44.311 BASAL CELL CARCINOMA (BCC) OF SKIN OF NOSE: Primary | ICD-10-CM

## 2021-03-01 DIAGNOSIS — E11.9 TYPE 2 DIABETES MELLITUS WITHOUT COMPLICATION, WITHOUT LONG-TERM CURRENT USE OF INSULIN (HCC): ICD-10-CM

## 2021-03-01 PROCEDURE — 1036F TOBACCO NON-USER: CPT | Performed by: PHYSICIAN ASSISTANT

## 2021-03-01 PROCEDURE — G8427 DOCREV CUR MEDS BY ELIG CLIN: HCPCS | Performed by: PHYSICIAN ASSISTANT

## 2021-03-01 PROCEDURE — G8484 FLU IMMUNIZE NO ADMIN: HCPCS | Performed by: PHYSICIAN ASSISTANT

## 2021-03-01 PROCEDURE — 4040F PNEUMOC VAC/ADMIN/RCVD: CPT | Performed by: PHYSICIAN ASSISTANT

## 2021-03-01 PROCEDURE — 99213 OFFICE O/P EST LOW 20 MIN: CPT | Performed by: PHYSICIAN ASSISTANT

## 2021-03-01 PROCEDURE — G8417 CALC BMI ABV UP PARAM F/U: HCPCS | Performed by: PHYSICIAN ASSISTANT

## 2021-03-01 PROCEDURE — 1123F ACP DISCUSS/DSCN MKR DOCD: CPT | Performed by: PHYSICIAN ASSISTANT

## 2021-03-01 ASSESSMENT — PATIENT HEALTH QUESTIONNAIRE - PHQ9
SUM OF ALL RESPONSES TO PHQ9 QUESTIONS 1 & 2: 0
SUM OF ALL RESPONSES TO PHQ QUESTIONS 1-9: 0

## 2021-03-01 NOTE — PROGRESS NOTES
3/1/2021    Antwon Ortiz    Chief Complaint   Patient presents with    Facial Laceration     pt states he has a sore on the left side of his nose that just does not seem to want to go away . pt states that he has had this sore for about 3 weeks . Pt states that it does not hurt . He says the scab comes off and then it comes back . Pt states he has not put anything  on it . HPI  History was obtained from the patient and his son. Darby Nolan is a 80 y.o. male who presents today for SOV. Patient is here with complaints of a sore over the left side of his nose that is growing in size. The patient states the sores been there for 3 weeks but his son says that it has been there for a couple of months. Patient notes that it continues to open up and bleed. Patient does note high sun exposure, worked in construction for several years and did not wear sunscreen. Denies history of skin cancer.       SOCIAL HISTORY  Social History     Socioeconomic History    Marital status:      Spouse name: None    Number of children: None    Years of education: None    Highest education level: None   Occupational History    None   Social Needs    Financial resource strain: None    Food insecurity     Worry: None     Inability: None    Transportation needs     Medical: None     Non-medical: None   Tobacco Use    Smoking status: Never Smoker    Smokeless tobacco: Never Used   Substance and Sexual Activity    Alcohol use: No    Drug use: No    Sexual activity: None   Lifestyle    Physical activity     Days per week: None     Minutes per session: None    Stress: None   Relationships    Social connections     Talks on phone: None     Gets together: None     Attends Pentecostal service: None     Active member of club or organization: None     Attends meetings of clubs or organizations: None     Relationship status: None    Intimate partner violence     Fear of current or ex partner: None     Emotionally abused: None Physically abused: None     Forced sexual activity: None   Other Topics Concern    None   Social History Narrative    None        CURRENT MEDICATIONS  Current Outpatient Medications   Medication Sig Dispense Refill    furosemide (LASIX) 40 MG tablet Take 1 tablet by mouth every morning Take with a 20 mg tablet to equal 60 mg. 90 tablet 0    glimepiride (AMARYL) 4 MG tablet Take 1 tablet by mouth every morning (before breakfast) 90 tablet 0    insulin aspart (NOVOLOG FLEXPEN) 100 UNIT/ML injection pen Inject 8 Units into the skin 3 times daily (before meals) 5 pen 1    Insulin Degludec (TRESIBA FLEXTOUCH) 200 UNIT/ML SOPN Inject 25 Units into the skin nightly 3 pen 2    warfarin (COUMADIN) 5 MG tablet Take as directed per INR 60 tablet 1    furosemide (LASIX) 20 MG tablet Take 1 tablet by mouth every morning Take with a 40 mg tablet to equal 60 mg. 90 tablet 1    cilostazol (PLETAL) 50 MG tablet Take 1 tablet by mouth 2 times daily 180 tablet 1    atenolol (TENORMIN) 50 MG tablet Take 1 tablet by mouth daily 90 tablet 1    atorvastatin (LIPITOR) 80 MG tablet Take 1 tablet by mouth daily 90 tablet 1    gabapentin (NEURONTIN) 300 MG capsule Take 300 mg by mouth 3 times daily.  ferrous sulfate 325 (65 Fe) MG tablet Take 325 mg by mouth every other day      oxyCODONE-acetaminophen (PERCOCET) 7.5-325 MG per tablet Take 1 tablet by mouth 2 times daily as needed for Pain. Avoid Alcohol. ..causes drowsiness.  ONETOUCH DELICA LANCETS FINE MISC 1 each 3 times daily and as needed.  blood glucose test strips (ONETOUCH VERIO) strip 1 each 3 times daily and as  needed.  simvastatin (ZOCOR) 80 MG tablet Take 80 mg by mouth nightly.         SITagliptin (JANUVIA) 50 MG tablet TAKE ONE TABLET BY MOUTH ONCE EVERY DAY 90 tablet 1    glyBURIDE (DIABETA) 5 MG tablet Take 1 tablet by mouth 2 times daily (Patient not taking: Reported on 2/10/2020) 30 tablet 5    oxyCODONE-acetaminophen (PERCOCET)  MG per tablet Take 1 tablet by mouth every 6 hours as needed for Pain Shingles pain       No current facility-administered medications for this visit. PHYSICAL EXAM    /70   Pulse 71   Temp 98 °F (36.7 °C)   Ht 5' 8.5\" (1.74 m)   Wt 190 lb (86.2 kg)   SpO2 92%   BMI 28.47 kg/m²     Physical Exam  Vitals signs and nursing note reviewed. Constitutional:       General: He is not in acute distress. Appearance: He is not ill-appearing. Skin:     Comments: Elevated skin lesion over left side of distal nose with rolled borders and a dip of likely necrosis in the center. Neurological:      Mental Status: He is alert. ASSESSMENT & PLAN    1. Basal cell carcinoma (BCC) of skin of nose  Clinical diagnosis of basal cell carcinoma based on the look of the skin lesion. Referring to dermatology for further evaluation and to discuss treatment options with the patient. - External Referral To Dermatology           Electronically signed by Werner Corona PA-C on 3/1/2021    Please note that this chart was generated using dragon dictation software. Although every effort was made to ensure the accuracy of this automated transcription, some errors in transcription may have occurred.

## 2021-03-11 DIAGNOSIS — Z86.711 HISTORY OF PULMONARY EMBOLUS (PE): ICD-10-CM

## 2021-03-12 RX ORDER — WARFARIN SODIUM 5 MG/1
TABLET ORAL
Qty: 60 TABLET | Refills: 1 | Status: SHIPPED | OUTPATIENT
Start: 2021-03-12 | End: 2021-01-01 | Stop reason: SDUPTHER

## 2021-04-02 ENCOUNTER — TELEPHONE (OUTPATIENT)
Dept: FAMILY MEDICINE CLINIC | Age: 86
End: 2021-04-02

## 2021-04-02 DIAGNOSIS — I73.9 PAD (PERIPHERAL ARTERY DISEASE) (HCC): Primary | ICD-10-CM

## 2021-04-02 NOTE — TELEPHONE ENCOUNTER
Please call Liv----she has to give you extensive information regarding a request from [facility] [employer]-----needs a statement stating you are  the primary doctor for patient --he needs a referral to another pain management office---Liv said Dr. Yanci Rose was her father-in-laws pain doctor.

## 2021-04-02 NOTE — LETTER
[address] [place] [address]   Phone: [phone]  Fax: [phone]    Donell Nunes MD        2021     Patient: Jonas Gracia   YOB: 1929   Date of Visit: 2021       To Whom It May Concern:    Jonas Gracia is currently my patient and no longer see's his previous PCP as he is now . If you have any questions or concerns, please don't hesitate to call.     Sincerely,        Donell Nunes MD

## 2021-04-05 DIAGNOSIS — I10 HYPERTENSION, UNSPECIFIED TYPE: ICD-10-CM

## 2021-04-05 DIAGNOSIS — I73.9 PAD (PERIPHERAL ARTERY DISEASE) (HCC): ICD-10-CM

## 2021-04-05 RX ORDER — CILOSTAZOL 50 MG/1
50 TABLET ORAL 2 TIMES DAILY
Qty: 180 TABLET | Refills: 1 | Status: SHIPPED | OUTPATIENT
Start: 2021-04-05 | End: 2021-01-01 | Stop reason: SDUPTHER

## 2021-04-05 RX ORDER — FUROSEMIDE 20 MG/1
20 TABLET ORAL EVERY MORNING
Qty: 90 TABLET | Refills: 1 | Status: SHIPPED | OUTPATIENT
Start: 2021-04-05 | End: 2021-01-01 | Stop reason: SDUPTHER

## 2021-04-05 RX ORDER — ATENOLOL 50 MG/1
50 TABLET ORAL DAILY
Qty: 90 TABLET | Refills: 1 | Status: SHIPPED | OUTPATIENT
Start: 2021-04-05 | End: 2021-01-01 | Stop reason: SDUPTHER

## 2021-04-06 NOTE — TELEPHONE ENCOUNTER
Spoke with Hema. States that [place] denied her dads insurance claim because they need a new referral.  Also need letter stating that Dr. Claudia Russell is his PCP and his previous PCP, Dr Socorro Thakur is .     Referral to [facility]    Fax letter to [facility] #[phone]  Date of Service: 3/17/21  Fax #[phone]

## 2021-04-30 NOTE — TELEPHONE ENCOUNTER
Detailed message left on identified voicemail that referral was faxed to them on 4/8 (under media) and to check with them again and make sure they received it. If not we can refax.

## 2021-04-30 NOTE — TELEPHONE ENCOUNTER
Please send a referral to: [facility]  (on [address] [facility] in [facility] )    Patient is receivig a bill from the pain management office and his insurance company said patient's doctor has to send a referral and then the insurance will pay for it.

## 2021-05-12 PROBLEM — I10 ESSENTIAL HYPERTENSION: Status: ACTIVE | Noted: 2021-01-01

## 2021-05-12 PROBLEM — R13.19 OTHER DYSPHAGIA: Status: ACTIVE | Noted: 2021-01-01

## 2021-05-12 PROBLEM — E78.49 OTHER HYPERLIPIDEMIA: Status: ACTIVE | Noted: 2021-01-01

## 2021-05-12 NOTE — PROGRESS NOTES
5/12/2021    Ashley Crawford    Chief Complaint   Patient presents with    Referral - General     swallowing problem.  Medication Problem     cost issue with Zahida Jones Other     trouble catching breath at night couple times       HPI  History was obtained from the patient and his daughter-in-law Hallie Rooney. Dominga Lindsey is a 80 y.o. male who presents today with complaints of swallowing difficulty food catching in his chest and throat. Worries about being unable to afford his Januvia. Patient remains on Coumadin long-term uncertain what for possibly 4 history of arrhythmia and cardiomyopathy. INR today was 2.7. Patient is diabetic and needs follow-up labs. Other diagnoses include advanced age, hypertension, hyperlipidemia. Apparently has a carotid artery stenosis also noted in the past.  He has had his Covid virus vaccination. He has not seen a cardiologist now for quite some time will make referral to [facility] cardiology. REVIEW OF SYMPTOMS    Review of Systems   Constitutional: Negative for activity change and fatigue. HENT: Negative for congestion, hearing loss, mouth sores and trouble swallowing. Eyes: Negative for pain and visual disturbance. Respiratory: Negative for chest tightness, shortness of breath and wheezing. Cardiovascular: Negative for chest pain and palpitations. Gastrointestinal: Negative for abdominal pain, blood in stool, diarrhea, nausea and vomiting. Patient with history of dysphagia becoming progressively more of an issue. Endocrine: Negative for cold intolerance, polydipsia and polyuria. Genitourinary: Negative for dysuria, frequency and urgency. Musculoskeletal: Negative for arthralgias, gait problem and neck stiffness. Skin: Negative for rash. Allergic/Immunologic: Negative for environmental allergies. Neurological: Negative for dizziness, seizures, speech difficulty and weakness. Hematological: Does not bruise/bleed easily.    Psychiatric/Behavioral: Negative for agitation, confusion, hallucinations and suicidal ideas. The patient is not nervous/anxious. PAST MEDICAL HISTORY  Past Medical History:   Diagnosis Date    CAD (coronary artery disease)     Diabetes mellitus ([address]     Hyperlipidemia     Hypertension        FAMILY HISTORY  No family history on file.     SOCIAL HISTORY  Social History     Socioeconomic History    Marital status:      Spouse name: None    Number of children: None    Years of education: None    Highest education level: None   Occupational History    None   Social Needs    Financial resource strain: None    Food insecurity     Worry: None     Inability: None    Transportation needs     Medical: None     Non-medical: None   Tobacco Use    Smoking status: Never Smoker    Smokeless tobacco: Never Used   Substance and Sexual Activity    Alcohol use: No    Drug use: No    Sexual activity: None   Lifestyle    Physical activity     Days per week: None     Minutes per session: None    Stress: None   Relationships    Social connections     Talks on phone: None     Gets together: None     Attends Mu-ism service: None     Active member of club or organization: None     Attends meetings of clubs or organizations: None     Relationship status: None    Intimate partner violence     Fear of current or ex partner: None     Emotionally abused: None     Physically abused: None     Forced sexual activity: None   Other Topics Concern    None   Social History Narrative    None        SURGICAL HISTORY  Past Surgical History:   Procedure Laterality Date    ABDOMEN SURGERY      hernia repair    CAROTID ENDARTERECTOMY  2008    Right    CORONARY ANGIOPLASTY WITH STENT PLACEMENT      2 stents    INGUINAL HERNIA REPAIR Left                  CURRENT MEDICATIONS  Current Outpatient Medications   Medication Sig Dispense Refill    atorvastatin (LIPITOR) 80 MG tablet Take 1 tablet by mouth daily 90 tablet 1    furosemide (LASIX) 20 MG tablet Take 1 tablet by mouth every morning Take with a 40 mg tablet to equal 60 mg. 90 tablet 1    glimepiride (AMARYL) 4 MG tablet Take 1 tablet by mouth every morning (before breakfast) 90 tablet 0    atenolol (TENORMIN) 50 MG tablet Take 1 tablet by mouth daily 90 tablet 1    cilostazol (PLETAL) 50 MG tablet Take 1 tablet by mouth 2 times daily 180 tablet 1    warfarin (COUMADIN) 5 MG tablet Take as directed per INR 60 tablet 1    SITagliptin (JANUVIA) 50 MG tablet TAKE ONE TABLET BY MOUTH ONCE EVERY DAY 90 tablet 1    insulin aspart (NOVOLOG FLEXPEN) 100 UNIT/ML injection pen Inject 8 Units into the skin 3 times daily (before meals) 5 pen 1    Insulin Degludec (TRESIBA FLEXTOUCH) 200 UNIT/ML SOPN Inject 25 Units into the skin nightly 3 pen 2    glyBURIDE (DIABETA) 5 MG tablet Take 1 tablet by mouth 2 times daily 30 tablet 5    oxyCODONE-acetaminophen (PERCOCET)  MG per tablet Take 1 tablet by mouth every 6 hours as needed for Pain Shingles pain      furosemide (LASIX) 40 MG tablet Take 1 tablet by mouth every morning Take with a 20 mg tablet to equal 60 mg. 90 tablet 0    gabapentin (NEURONTIN) 300 MG capsule Take 300 mg by mouth 3 times daily.  ferrous sulfate 325 (65 Fe) MG tablet Take 325 mg by mouth every other day      oxyCODONE-acetaminophen (PERCOCET) 7.5-325 MG per tablet Take 1 tablet by mouth 2 times daily as needed for Pain. Avoid Alcohol. ..causes drowsiness.  ONETOUCH DELICA LANCETS FINE MISC 1 each 3 times daily and as needed.  blood glucose test strips (ONETOUCH VERIO) strip 1 each 3 times daily and as  needed.  simvastatin (ZOCOR) 80 MG tablet Take 80 mg by mouth nightly. No current facility-administered medications for this visit.         ALLERGIES  No Known Allergies    PHYSICAL EXAM    /62 (Site: Right Upper Arm, Position: Sitting, Cuff Size: Medium Adult)   Pulse 83   Ht 5' 8.5\" (1.74 m)   Wt 186 lb (84.4 kg)   SpO2 93%   BMI 27.87

## 2021-05-13 PROBLEM — N18.4 CKD (CHRONIC KIDNEY DISEASE) STAGE 4, GFR 15-29 ML/MIN (HCC): Status: ACTIVE | Noted: 2021-01-01

## 2021-07-30 PROBLEM — Z79.4 TYPE 2 DIABETES MELLITUS WITH KIDNEY COMPLICATION, WITH LONG-TERM CURRENT USE OF INSULIN (HCC): Status: ACTIVE | Noted: 2021-01-01

## 2021-07-30 PROBLEM — E11.29 TYPE 2 DIABETES MELLITUS WITH KIDNEY COMPLICATION, WITH LONG-TERM CURRENT USE OF INSULIN (HCC): Status: ACTIVE | Noted: 2021-01-01

## 2021-07-30 PROBLEM — K21.9 GERD (GASTROESOPHAGEAL REFLUX DISEASE): Status: ACTIVE | Noted: 2021-01-01

## 2021-07-30 NOTE — PROGRESS NOTES
2021    TELEHEALTH EVALUATION -- Audio/Visual (During PWQPQ-83 public health emergency)    HPI:    Rosebud Meckel (:  1929) has requested an audio/video evaluation for the following concern(s):    Hypertension, diabetes mellitus type 2, dyspnea, dysphagia, CKD 4, and history of PE. Patient states he is actually feeling a bit better breathing is improved he had some recent Derm surgery for skin cancer in the face that went well. This is done per [facility] dermatology last labs in May 2021 showed A1c of 9.1%. He says sugars at times are better but still up at times also. Has not gotten to see his nephrologist Dr. Bin oGmez yet. Has not seen cardiology yet. Did see GI medicine and underwent an EGD and esophageal dilatation that helped. Remains on iron for her his chronic anemia. Mood remains positive he is alert today. Has been up and about and denies falls. BMP has been appropriate on review -sees pain clinic. Review of Systems   Constitutional: Negative for activity change and fatigue. HENT: Negative for congestion, hearing loss, mouth sores and trouble swallowing. Eyes: Negative for pain and visual disturbance. Respiratory: Negative for chest tightness, shortness of breath and wheezing. Cardiovascular: Negative for chest pain and palpitations. Gastrointestinal: Negative for abdominal pain, blood in stool, diarrhea, nausea and vomiting. Dysphagia improved status post EGD and esophageal dilatation. Endocrine: Negative for cold intolerance, polydipsia and polyuria. Genitourinary: Negative for dysuria, frequency and urgency. Musculoskeletal: Negative for arthralgias, gait problem and neck stiffness. Skin: Negative for rash. Allergic/Immunologic: Negative for environmental allergies. Neurological: Negative for dizziness, seizures, speech difficulty and weakness. Hematological: Does not bruise/bleed easily.    Psychiatric/Behavioral: Negative for agitation, confusion, hallucinations and suicidal ideas. The patient is not nervous/anxious. Prior to Visit Medications    Medication Sig Taking? Authorizing Provider   pantoprazole (PROTONIX) 40 MG tablet Take 40 mg by mouth daily Yes Historical Provider, MD   Insulin Degludec (TRESIBA FLEXTOUCH) 200 UNIT/ML SOPN Inject 25 Units into the skin nightly Yes Marge Velasquez,    atorvastatin (LIPITOR) 80 MG tablet Take 1 tablet by mouth daily Yes Jennifer Gonsalez MD   furosemide (LASIX) 20 MG tablet Take 1 tablet by mouth every morning Take with a 40 mg tablet to equal 60 mg. Yes Jennifer Gonsalez MD   glimepiride (AMARYL) 4 MG tablet Take 1 tablet by mouth every morning (before breakfast) Yes Jennifer Gonsalez MD   atenolol (TENORMIN) 50 MG tablet Take 1 tablet by mouth daily Yes Jennifer Gonsalez MD   cilostazol (PLETAL) 50 MG tablet Take 1 tablet by mouth 2 times daily Yes Jennifer Gonsalez MD   warfarin (COUMADIN) 5 MG tablet Take as directed per INR Yes Jennifer Gonsalez MD   SITagliptin (JANUVIA) 50 MG tablet TAKE ONE TABLET BY MOUTH ONCE EVERY DAY Yes Jennifer Gonsalez MD   furosemide (LASIX) 40 MG tablet Take 1 tablet by mouth every morning Take with a 20 mg tablet to equal 60 mg. Yes Jennifer Gonsalez MD   insulin aspart (NOVOLOG FLEXPEN) 100 UNIT/ML injection pen Inject 8 Units into the skin 3 times daily (before meals) Yes Jennifer Gonsalez MD   glyBURIDE (DIABETA) 5 MG tablet Take 1 tablet by mouth 2 times daily Yes Jennifer Gonsalez MD   gabapentin (NEURONTIN) 300 MG capsule Take 300 mg by mouth 3 times daily. Yes Historical Provider, MD   ferrous sulfate 325 (65 Fe) MG tablet Take 325 mg by mouth every other day Yes Historical Provider, MD   oxyCODONE-acetaminophen (PERCOCET) 7.5-325 MG per tablet Take 1 tablet by mouth 2 times daily as needed for Pain. Avoid Alcohol. ..causes drowsiness.  Yes Historical Provider, MD   GOOD BUR[facility] DELICA LANCETS FINE MISC 1 each 3 times daily and as needed. Yes Historical Provider, MD   blood glucose test strips (ONETOUCH VERIO) strip 1 each 3 times daily and as  needed. Yes Historical Provider, MD   simvastatin (ZOCOR) 80 MG tablet Take 80 mg by mouth nightly. Yes Historical Provider, MD       Social History     Tobacco Use    Smoking status: Never Smoker    Smokeless tobacco: Never Used   Substance Use Topics    Alcohol use: No    Drug use: No            PHYSICAL EXAMINATION:  [ INSTRUCTIONS:  \"[x]\" Indicates a positive item  \"[]\" Indicates a negative item  -- DELETE ALL ITEMS NOT EXAMINED]  Vital Signs: (As obtained by patient/caregiver or practitioner observation)    Blood pressure-  Heart rate-    Respiratory rate-    Temperature-  Pulse oximetry-     Constitutional: [x] Appears well-developed and well-nourished [x] No apparent distress      [] Abnormal-   Mental status  [x] Alert and awake  [x] Oriented to person/place/time [x]Able to follow commands      Eyes:  EOM    [x]  Normal  [] Abnormal-  Sclera  [x]  Normal  [] Abnormal -         Discharge []  None visible  [] Abnormal -    HENT:   [x] Normocephalic, atraumatic.   [] Abnormal   [x] Mouth/Throat: Mucous membranes are moist.     External Ears [] Normal  [] Abnormal-     Neck: [x] No visualized mass     Pulmonary/Chest: [x] Respiratory effort normal.  [x] No visualized signs of difficulty breathing or respiratory distress        [] Abnormal-      Musculoskeletal:   [] Normal gait with no signs of ataxia         [x] Normal range of motion of neck        [] Abnormal-       Neurological:        [x] No Facial Asymmetry (Cranial nerve 7 motor function) (limited exam to video visit)          [x] No gaze palsy        [] Abnormal-         Skin:        [x] No significant exanthematous lesions or discoloration noted on facial skin         [] Abnormal-            Psychiatric:       [x] Normal Affect [] No Hallucinations        [] Abnormal-     Other pertinent observable physical exam findings- ASSESSMENT/PLAN:  1. Other dysphagia      2. Gastroesophageal reflux disease, unspecified whether esophagitis present      3. Essential hypertension    - CBC; Future  - COMPREHENSIVE METABOLIC PANEL; Future    4. CKD (chronic kidney disease) stage 4, GFR 15-29 ml/min ([facility])    - COMPREHENSIVE METABOLIC PANEL; Future    5. Other hyperlipidemia      6. Type 2 diabetes mellitus with stage 4 chronic kidney disease, with long-term current use of insulin ([facility])    - HEMOGLOBIN A1C; Future    7. Anemia, unspecified type    8. Pulmonary embolism, unspecified chronicity, unspecified pulmonary embolism type, unspecified whether acute cor pulmonale present ([facility] [place] [address]    - PROTIME-INR; Future  Patient will need Pneumovax 23 with next visit. Multiple refills will be provided. We will check CMP, CBC, A1c, and INR and have him follow-up for these results in the next 2 to 3 weeks. Advised to see his nephrologist Vince Shay- we will consider a cardiology appointment if shortness of breath recurs. Continue to work on activity and diet as possible. Call with questions or problems  Return in about 3 months (around 10/30/2021). Sunni Peterson, was evaluated through a synchronous (real-time) audio-video encounter. The patient (or guardian if applicable) is aware that this is a billable service. Verbal consent to proceed has been obtained within the past 12 months. The visit was conducted pursuant to the emergency declaration under the [address] [place] [address], [address] [place] [address] authority and the [employer] General Act. Patient identification was verified, and a caregiver was present when appropriate. The patient was located in a state where the provider was credentialed to provide care. Total time spent on this encounter: Not billed by time    --Gricel Urbano MD on 7/30/2021 at 5:30 PM    An electronic signature was used to authenticate this note.

## 2021-10-05 NOTE — TELEPHONE ENCOUNTER
----- Message from Arun Padron sent at 10/5/2021  4:38 PM EDT -----  Subject: Message to Provider    QUESTIONS  Information for Provider? Patient's son wants to make sure that patient's   atenolol (TENORMIN) 50 MG tablet, is sent over to the pharmacy asap   tomorrow morning, if possible, because patient is completely out of the   medication.  ---------------------------------------------------------------------------  --------------  CALL BACK INFO  What is the best way for the office to contact you? OK to leave message on   voicemail  Preferred Call Back Phone Number? [phone]  ---------------------------------------------------------------------------  --------------  SCRIPT ANSWERS  Relationship to Patient? Guardian  Representative Name? Brandon Shermanarch  Is the [employer] on the appropriate HIPAA document in Epic?  Yes

## 2021-10-06 NOTE — TELEPHONE ENCOUNTER
----- Message from [facility] [place] sent at 9/27/2021  1:27 PM EDT -----  Subject: Refill Request    QUESTIONS  Name of Medication? furosemide (LASIX) 20 MG tablet  Patient-reported dosage and instructions? 20 MG, QD   How many days do you have left? 0  Preferred Pharmacy? [address] [facility] [address] [facility] [address]  Pharmacy phone number (if available)? [phone]  ---------------------------------------------------------------------------  --------------  CALL BACK INFO  What is the best way for the office to contact you? OK to leave message on   voicemail  Preferred Call Back Phone Number?  [phone]

## 2021-10-06 NOTE — TELEPHONE ENCOUNTER
----- Message from [facility] Ge sent at 10/4/2021  1:03 PM EDT -----  Subject: Refill Request    QUESTIONS  Name of Medication? atenolol (TENORMIN) 50 MG tablet  Patient-reported dosage and instructions? 50 mg / 1 daily   How many days do you have left? 0  Preferred Pharmacy? [address] phone number (if available)? [phone]  ---------------------------------------------------------------------------  --------------  CALL BACK INFO  What is the best way for the office to contact you? OK to leave message on   voicemail  Preferred Call Back Phone Number?  [phone]

## 2021-10-20 NOTE — TELEPHONE ENCOUNTER
----- Message from [address] [facility] sent at 10/19/2021  2:30 PM EDT -----  Subject: Refill Request    QUESTIONS  Name of Medication? cilostazol (PLETAL) 50 MG tablet  Patient-reported dosage and instructions? 50 mg, 1 po bid  How many days do you have left? 2  Preferred Pharmacy? [address] [facility] phone number (if available)? [phone]  ---------------------------------------------------------------------------  --------------  CALL BACK INFO  What is the best way for the office to contact you? OK to leave message on   voicemail  Preferred Call Back Phone Number?  [phone]

## 2021-12-20 PROBLEM — J96.01 ACUTE RESPIRATORY FAILURE WITH HYPOXEMIA (HCC): Status: ACTIVE | Noted: 2021-01-01

## 2021-12-20 NOTE — CONSULTS
Nephrology Service Consultation      [address], [address] [address]  Phone: [phone]  Office Hours: 8:30AM - 4:30PM  Monday - Friday            Patient:  Azeb Kinsey  MRN: [id number]  Consulting physician:  Catalina Allan MD  Reason for Consult: elevated creat      PCP: Kassy Oliva MD    HISTORY OF PRESENT ILLNESS:   The patient is a 80 y.o. male with  Ckd, presented with dyspnea  Renal consult for cr 2.3. He is on heparin gtt for suspicion of PE  He received 40mg iv lasix overnight  He denies diarrhea, vomiting      REVIEW OF SYSTEMS:  14 point ROS is Negative. See positive ROS per HPI    Past Medical History:        Diagnosis Date    CAD (coronary artery disease)     Diabetes mellitus ([address]     Hyperlipidemia     Hypertension        Past Surgical History:        Procedure Laterality Date    ABDOMEN SURGERY      hernia repair    CAROTID ENDARTERECTOMY  2008    Right    CORONARY ANGIOPLASTY WITH STENT PLACEMENT      2 stents    INGUINAL HERNIA REPAIR Left        Medications:   Prior to Admission medications    Medication Sig Start Date End Date Taking?  Authorizing Provider   glimepiride (AMARYL) 4 MG tablet Take 1 tablet by mouth every morning (before breakfast) 11/18/21   Felicita Felty, MD   cilostazol (PLETAL) 50 MG tablet Take 1 tablet by mouth 2 times daily 10/20/21   Felicita Felty, MD   furosemide (LASIX) 40 MG tablet Take 1 tablet by mouth every morning Take with a 20 mg tablet to equal 60 mg. 10/6/21   Felicita Felty, MD   atenolol (TENORMIN) 50 MG tablet Take 1 tablet by mouth daily 10/6/21   Felicita Felty, MD   warfarin (COUMADIN) 5 MG tablet Take as directed per INR 9/17/21   Felicita Felty, MD   insulin aspart (NOVOLOG FLEXPEN) 100 UNIT/ML injection pen Inject 8 Units into the skin 3 times daily (before meals) 9/10/21   Felicita Felty, MD   pantoprazole (PROTONIX) 40 MG tablet Take 40 mg by mouth daily normocephalic, atraumatic  Neck: supple, trachea midline  Lungs: clear to auscultation bilaterally, breathing comfortably on nc  Heart[de-identified] regular rate and rhythm, S1, S2 normal,  Abdomen: soft, non-tender; bowel sounds normal; no masses,   Extremities: extremities normal, atraumatic, no cyanosis or edema  Neurologic: Mental status: alert, oriented, interactive, following commands  Psychiatric: mood and affect appropriate    CBC:   Recent Labs     12/20/21  0025 12/20/21  0700   WBC 9.7 9.6   HGB 10.6* 9.9*    311     BMP:    Recent Labs     12/20/21 0025   *   K 4.6   CL 95*   CO2 28   BUN 33*   CREATININE 2.3*   GLUCOSE 151*     Hepatic:   Recent Labs     12/20/21 0025   AST 9*   ALT 6*   BILITOT 0.2   ALKPHOS 107         Assessment and Recommendations     Patient Active Problem List    Diagnosis Date Noted    Acute respiratory failure with hypoxemia ([facility] [address] 12/20/2021    GERD (gastroesophageal reflux disease) 07/30/2021    Type 2 diabetes mellitus with kidney complication, with long-term current use of insulin ([address] 07/30/2021    CKD (chronic kidney disease) stage 4, GFR 15-29 ml/min ([address] 05/13/2021    Essential hypertension 05/12/2021    Other hyperlipidemia 05/12/2021    Other dysphagia 05/12/2021    Bilateral carotid artery stenosis 08/16/2016     YASMEEN  CKD4  Hyponatremia   Acute hypoxic resp failure  Suspected PE  Mild probnp elevation    Plan:  Cr 2.3  Continue supportive mgmt  Avoid nephrotoxins  Daily BMP  Will follow    Thank you    Electronically signed by Felix Griffith DO on 12/20/2021 at 8:27 MD Deysi Kilpatrick DO  [address]  [place], [place] [address]  PHONE: [phone]  FAX: [phone]

## 2021-12-20 NOTE — CONSULTS
Chart reviewed  Full note to follow                      Name:  Sonali Sanchez /Age/Sex: 1929  (80 y.o. male)   MRN & CSN:  [id number] & [id number] Admission Date/Time: 2021 11:49 PM   Location:  ED28/ED-28 PCP: Rashard Morin, [address] Day: 2          Referring physician:  Natanael Carlos DO         Reason for consultation:  elev trop        Thanks for referral.    Information source: patient    CC;  sob      HPI:   Thank you for involving me in taking  care of Sonali Sanchez who  is a 80 y. o.year  Old male  Presents with  H/o pe, now with worsening SOB and hypoxia, has no CP, has rt sided pl cp. Trop mildly elevated. Creat is elevated. Past medical history:    has a past medical history of CAD (coronary artery disease), Diabetes mellitus ([address] Hyperlipidemia, and Hypertension. Past surgical history:   has a past surgical history that includes Coronary angioplasty with stent; Carotid endarterectomy (); Abdomen surgery; and Inguinal hernia repair (Left). Social History:   reports that he has never smoked. He has never used smokeless tobacco. He reports that he does not drink alcohol and does not use drugs. Family history:  family history includes No Known Problems in his mother; Stroke in his father.     No Known Allergies    cilostazol (PLETAL) tablet 50 mg, BID  [START ON 2021] furosemide (LASIX) tablet 60 mg, Daily  pantoprazole (PROTONIX) tablet 40 mg, Daily  oxyCODONE-acetaminophen (PERCOCET) 7.5-325 MG per tablet 1 tablet, BID PRN  insulin glargine (LANTUS) injection vial 25 Units, Nightly  insulin lispro (HUMALOG) injection vial 8 Units, TID WC  glucose (GLUTOSE) 40 % oral gel 15 g, PRN  dextrose 50 % IV solution, PRN  glucagon (rDNA) injection 1 mg, PRN  dextrose 5 % solution, PRN  sodium chloride flush 0.9 % injection 5-40 mL, 2 times per day  sodium chloride flush 0.9 % injection 5-40 mL, PRN  0.9 % sodium chloride infusion, PRN  ondansetron (ZOFRAN-ODT) disintegrating tablet 4 mg, Q8H PRN   Or  ondansetron (ZOFRAN) injection 4 mg, Q6H PRN  polyethylene glycol (GLYCOLAX) packet 17 g, Daily PRN  acetaminophen (TYLENOL) tablet 650 mg, Q6H PRN   Or  acetaminophen (TYLENOL) suppository 650 mg, Q6H PRN  insulin lispro (HUMALOG) injection vial 0-6 Units, TID WC  insulin lispro (HUMALOG) injection vial 0-3 Units, Nightly  heparin (porcine) injection 6,530 Units, Once  heparin (porcine) injection 6,530 Units, PRN  heparin (porcine) injection 3,260 Units, PRN  heparin 25,000 units in dextrose 5% 250 mL (premix) infusion, Continuous  warfarin (COUMADIN) tablet 5 mg, Daily      Current Facility-Administered Medications   Medication Dose Route Frequency Provider Last Rate Last Admin    cilostazol (PLETAL) tablet 50 mg  50 mg Oral BID Diallo Sunshiner, DO        [START ON 12/21/2021] furosemide (LASIX) tablet 60 mg  60 mg Oral Daily Sulaiman Sanchez, DO        pantoprazole (PROTONIX) tablet 40 mg  40 mg Oral Daily Sulaiman Laura, DO        oxyCODONE-acetaminophen (PERCOCET) 7.5-325 MG per tablet 1 tablet  1 tablet Oral BID PRN Diallo Sunshiner, DO        insulin glargine (LANTUS) injection vial 25 Units  25 Units SubCUTAneous Nightly Sulaiman Sanchez, DO        insulin lispro (HUMALOG) injection vial 8 Units  8 Units SubCUTAneous TID  Sulaiman Laura, DO        glucose (GLUTOSE) 40 % oral gel 15 g  15 g Oral PRN Sulaiman Sanchez, DO        dextrose 50 % IV solution  12.5 g IntraVENous PRN Diallo Sunshiner, DO        glucagon (rDNA) injection 1 mg  1 mg IntraMUSCular PRN Diallo Sunshiner, DO        dextrose 5 % solution  100 mL/hr IntraVENous PRN Diallo Sunshiner, DO        sodium chloride flush 0.9 % injection 5-40 mL  5-40 mL IntraVENous 2 times per day Diallo Sunshiner, DO        sodium chloride flush 0.9 % injection 5-40 mL  5-40 mL IntraVENous PRN Sulaiman Sanchez, DO        0.9 % sodium chloride infusion  25 mL IntraVENous PRN Diallo Sunshiner, DO        ondansetron (ZOFRAN-ODT) disintegrating tablet 4 mg  4 mg Oral Q8H PRN Berneda Life, DO        Or    ondansetron (ZOFRAN) injection 4 mg  4 mg IntraVENous Q6H PRN Berneda Life, DO        polyethylene glycol (GLYCOLAX) packet 17 g  17 g Oral Daily PRN Berneda Life, DO        acetaminophen (TYLENOL) tablet 650 mg  650 mg Oral Q6H PRN Berneda Life, DO        Or    acetaminophen (TYLENOL) suppository 650 mg  650 mg Rectal Q6H PRN Berneda Life, DO        insulin lispro (HUMALOG) injection vial 0-6 Units  0-6 Units SubCUTAneous TID SHONDA Sanchez, DO        insulin lispro (HUMALOG) injection vial 0-3 Units  0-3 Units SubCUTAneous Nightly Berneda Life, DO        heparin (porcine) injection 6,530 Units  80 Units/kg IntraVENous Once Berneda Life, DO        heparin (porcine) injection 6,530 Units  80 Units/kg IntraVENous PRN Berneda Life, DO        heparin (porcine) injection 3,260 Units  40 Units/kg IntraVENous PRN Berneda Life, DO        heparin 25,000 units in dextrose 5% 250 mL (premix) infusion  5-30 Units/kg/hr IntraVENous Continuous Berneda Life, DO        warfarin (COUMADIN) tablet 5 mg  5 mg Oral Daily Berneda Life, DO         Current Outpatient Medications   Medication Sig Dispense Refill    glimepiride (AMARYL) 4 MG tablet Take 1 tablet by mouth every morning (before breakfast) 90 tablet 0    cilostazol (PLETAL) 50 MG tablet Take 1 tablet by mouth 2 times daily 180 tablet 1    furosemide (LASIX) 40 MG tablet Take 1 tablet by mouth every morning Take with a 20 mg tablet to equal 60 mg. 90 tablet 0    atenolol (TENORMIN) 50 MG tablet Take 1 tablet by mouth daily 90 tablet 0    warfarin (COUMADIN) 5 MG tablet Take as directed per INR 60 tablet 1    insulin aspart (NOVOLOG FLEXPEN) 100 UNIT/ML injection pen Inject 8 Units into the skin 3 times daily (before meals) 5 pen 1    pantoprazole (PROTONIX) 40 MG tablet Take 40 mg by mouth daily      Insulin Degludec (TRESIBA FLEXTOUCH) 200 UNIT/ML SOPN Inject 25 Units into the skin nightly 3 pen 2    atorvastatin (LIPITOR) 80 MG tablet Take 1 tablet by mouth daily 90 tablet 1    furosemide (LASIX) 20 MG tablet Take 1 tablet by mouth every morning Take with a 40 mg tablet to equal 60 mg. 90 tablet 1    SITagliptin (JANUVIA) 50 MG tablet TAKE ONE TABLET BY MOUTH ONCE EVERY DAY 90 tablet 1    glyBURIDE (DIABETA) 5 MG tablet Take 1 tablet by mouth 2 times daily 30 tablet 5    gabapentin (NEURONTIN) 300 MG capsule Take 300 mg by mouth 3 times daily.  ferrous sulfate 325 (65 Fe) MG tablet Take 325 mg by mouth every other day      oxyCODONE-acetaminophen (PERCOCET) 7.5-325 MG per tablet Take 1 tablet by mouth 2 times daily as needed for Pain. Avoid Alcohol. ..causes drowsiness.  ONETOUCH DELICA LANCETS FINE MISC 1 each 3 times daily and as needed.  blood glucose test strips (ONETOUCH VERIO) strip 1 each 3 times daily and as  needed.  simvastatin (ZOCOR) 80 MG tablet Take 80 mg by mouth nightly. Review of Systems:  All 14 systems reviewed, all negative except for  sob    Physical Examination:    /77   Pulse 85   Temp 98.1 °F (36.7 °C) (Oral)   Resp 17   Ht 5' 8\" (1.727 m)   Wt 180 lb (81.6 kg)   SpO2 99%   BMI 27.37 kg/m²      Wt Readings from Last 3 Encounters:   12/20/21 180 lb (81.6 kg)   05/12/21 186 lb (84.4 kg)   03/01/21 190 lb (86.2 kg)     Body mass index is 27.37 kg/m².       General Appearance:  fair  Head: normocephalic     Eyes: normal, noninjected conjunctiva    ENT: normal mucosa, noninjected throat, normal     NECK: No JVP  No thyromegaly        Cardiovascular: No thrills palpated   Auscultation: Normal S1 and S2,  no murmur   carotid bruit no   Abdominal Aorta no bruit    Respiratory:    Breath sounds Diminshed bilaterally    Extremities:  none Edema clubbing ,   no cyanosis    SKIN: Warm and well perfused, no pallor or cyanosis    Vascular exam:  Pedal Pulses: palp  bilaterally        Abdomen:  No masses or tenderness. No organomegaly noted. Neurological:  Oriented to time, place, and person   No focal neurological deficit noted. Psychiatric:normal mood, no anxiety    Lab Review   Recent Labs     12/20/21  0700   WBC 9.6   HGB 9.9*   HCT 33.6*         Recent Labs     12/20/21  0025   *   K 4.6   CL 95*   CO2 28   BUN 33*   CREATININE 2.3*     Recent Labs     12/20/21  0025   AST 9*   ALT 6*   BILITOT 0.2   ALKPHOS 107     No results for input(s): TROPONINI in the last 72 hours.   Lab Results   Component Value Date    BNP 83 10/10/2012     Lab Results   Component Value Date    INR 1.38 12/20/2021    PROTIME 17.9 (H) 12/20/2021           Assessment/Recommendations:       - hypoxia: ? PE , check echo  - CPM with anticoag  - ? VQ scan if possible  - no PE on old Jitendra Pisano MD, 12/20/2021 8:41 AM

## 2021-12-20 NOTE — H&P
renal dysfunction    Other chronic medical conditions:   Continue all home meds except stated above or contraindicated. CAD: Currently without chest pain  HTN  HLD    Diet ADULT DIET; Regular; 4 carb choices (60 gm/meal); Low Phosphorus (Less than 1000 mg); 60 to 80 gm   DVT Prophylaxis [] Lovenox, [x]  Heparin, [] SCDs, [] Ambulation   GI Prophylaxis [] PPI,  [] H2 Blocker,  [] Carafate,  [] Diet/Tube Feeds   Code Status Full Code   Disposition Patient requires continued admission due to Acute respiratory failure with hypoxemia ([facility]   MDM [] Low, [] Moderate,[x]  High  Patient's risk as above due to acuity of condition with potential for decompensation. History of Present Illness:     Chief Complaint: Acute respiratory failure with hypoxemia ([facility]    Azeb Kinsey is a 80 y.o.  male with a reviewed and a noncontributory family history and a PMH as stated above, who presents with complaints of complaints right-sided pleuritic chest pain. Patient states he had right-sided pleuritic chest pain with shortness of breath which caused significant anxiety. Patient states he has a past history of multiple DVTs/PEs and was told if he had any shortness of breath he should be evaluated for blood clots. Given his past experience he decided to call squad. Patient symptoms have been going on for 1 day. Patient was on warfarin and his INR was found to be subtherapeutic. Patient does note right lower extremity swelling compared to left. Patient does endorse right chest postherpetic neuralgia x5 years. States it normally does not hurt to breathe and is normally only superficial pain and not pleuritic pain. Patient denies history of home oxygen use. Otherwise patient denies fever, chills, chest pain, cough, abdominal pain, nausea, vomiting, diarrhea, dark tarry stools, blood per rectum, dysuria, frequency, or urgency. Discussed case with ED provider.     ROS:   Review of Systems   Constitutional: Negative for appetite change, chills, diaphoresis, fatigue and fever. HENT: Negative for congestion, rhinorrhea and sore throat. Eyes: Negative for visual disturbance. Respiratory: Positive for chest tightness (Right pleuritic chest pain) and shortness of breath. Negative for cough and wheezing. Cardiovascular: Positive for leg swelling (Right lower extremity). Negative for chest pain and palpitations. Gastrointestinal: Negative for abdominal pain, anal bleeding, blood in stool, constipation, diarrhea, nausea and vomiting. Genitourinary: Negative for dysuria, frequency, hematuria and urgency. Musculoskeletal: Positive for gait problem. Negative for arthralgias. Skin: Positive for rash (Right chest consistent with postherpetic neuralgia skin irritation). Negative for color change. Neurological: Negative for dizziness, seizures, weakness, numbness and headaches. Psychiatric/Behavioral: Negative for confusion. Objective:   No intake or output data in the 24 hours ending 12/20/21 0634   Vitals:   Vitals:    12/20/21 0604   BP:    Pulse: 74   Resp: 17   Temp:    SpO2: 95%     BP (!) 92/51   Pulse 74   Temp 98.1 °F (36.7 °C) (Oral)   Resp 17   SpO2 95%   Physical Exam:   Physical Exam  Vitals and nursing note reviewed. Constitutional:       General: He is awake. He is not in acute distress. Appearance: Normal appearance. He is normal weight. He is not ill-appearing, toxic-appearing or diaphoretic. Interventions: He is not intubated. Nasal cannula in place. HENT:      Head: Atraumatic. Right Ear: External ear normal.      Left Ear: External ear normal.      Nose: Nose normal. No rhinorrhea. Mouth/Throat:      Mouth: Mucous membranes are moist.   Eyes:      General: No scleral icterus. Extraocular Movements: Extraocular movements intact. Conjunctiva/sclera: Conjunctivae normal.      Pupils: Pupils are equal, round, and reactive to light.    Cardiovascular:      Rate and Rhythm: Normal rate and regular rhythm. Pulses: Normal pulses. Heart sounds: Normal heart sounds. No murmur heard. No gallop. Comments: Tachycardia resolved. Right lower extremity swelling and warmth compared to left. Pulmonary:      Effort: Pulmonary effort is normal. Tachypnea present. No accessory muscle usage, prolonged expiration or respiratory distress. He is not intubated. Breath sounds: Normal breath sounds. No wheezing, rhonchi or rales. Chest:       Abdominal:      General: Abdomen is flat. Bowel sounds are normal. There is no distension. Palpations: Abdomen is soft. Tenderness: There is no abdominal tenderness. There is no guarding or rebound. Musculoskeletal:         General: Normal range of motion. Cervical back: Neck supple. Right lower leg: No edema. Left lower leg: No edema. Skin:     General: Skin is warm and dry. Capillary Refill: Capillary refill takes less than 2 seconds. Findings: Rash present. Comments: Please see chest section   Neurological:      Mental Status: He is alert and oriented to person, place, and time. Mental status is at baseline. Cranial Nerves: No cranial nerve deficit, dysarthria or facial asymmetry. Motor: No tremor or seizure activity. Psychiatric:         Attention and Perception: He is attentive. Mood and Affect: Mood is not anxious. Speech: He is communicative. Speech is not slurred. Behavior: Behavior is cooperative. Past Medical History:      Past Medical History:   Diagnosis Date    CAD (coronary artery disease)     Diabetes mellitus ([facility]     Hyperlipidemia     Hypertension      PSHX:  has a past surgical history that includes Coronary angioplasty with stent; Carotid endarterectomy (2008); Abdomen surgery; and Inguinal hernia repair (Left).     Allergies: No Known Allergies    FAM HX: family history includes No Known Problems in his mother; Stroke in his father. Soc HX:   Social History     Socioeconomic History    Marital status:      Spouse name: None    Number of children: None    Years of education: None    Highest education level: None   Occupational History    None   Tobacco Use    Smoking status: Never Smoker    Smokeless tobacco: Never Used   Substance and Sexual Activity    Alcohol use: No    Drug use: No    Sexual activity: None   Other Topics Concern    None   Social History Narrative    None     Social Determinants of Health     Financial Resource Strain:     Difficulty of Paying Living Expenses: Not on file   Food Insecurity:     Worried About Running Out of Food in the Last Year: Not on file    Finn of Food in the Last Year: Not on file   Transportation Needs:     Lack of Transportation (Medical): Not on file    Lack of Transportation (Non-Medical):  Not on file   Physical Activity:     Days of Exercise per Week: Not on file    Minutes of Exercise per Session: Not on file   Stress:     Feeling of Stress : Not on file   Social Connections:     Frequency of Communication with Friends and Family: Not on file    Frequency of Social Gatherings with Friends and Family: Not on file    Attends Denominational Services: Not on file    Active Member of [address] [place] [address] or Organizations: Not on file    Attends Club or Organization Meetings: Not on file    Marital Status: Not on file   Intimate Partner Violence:     Fear of Current or Ex-Partner: Not on file    Emotionally Abused: Not on file    Physically Abused: Not on file    Sexually Abused: Not on file   Housing Stability:     Unable to Pay for Housing in the Last Year: Not on file    Number of [place] in the Last Year: Not on file    Unstable Housing in the Last Year: Not on file       Data:   CBC with Differential:    Lab Results   Component Value Date    WBC 9.7 12/20/2021    RBC 3.88 12/20/2021    HGB 10.6 12/20/2021    HCT 35.4 12/20/2021     12/20/2021    MCV 91.2 12/20/2021    MCH 27.3 12/20/2021    MCHC 29.9 12/20/2021    RDW 14.1 12/20/2021    SEGSPCT 72.1 12/20/2021    LYMPHOPCT 13.7 12/20/2021    MONOPCT 7.3 12/20/2021    BASOPCT 0.6 12/20/2021    MONOSABS 0.7 12/20/2021    LYMPHSABS 1.3 12/20/2021    EOSABS 0.6 12/20/2021    BASOSABS 0.1 12/20/2021    DIFFTYPE AUTOMATED DIFFERENTIAL 12/20/2021       CMP:     Lab Results   Component Value Date     12/20/2021    K 4.6 12/20/2021    CL 95 12/20/2021    CO2 28 12/20/2021    BUN 33 12/20/2021    CREATININE 2.3 12/20/2021    GFRAA 32 12/20/2021    AGRATIO 0.9 05/12/2021    LABGLOM 27 12/20/2021    GLUCOSE 151 12/20/2021    PROT 7.2 12/20/2021    PROT 6.9 10/10/2012    LABALBU 3.1 12/20/2021    CALCIUM 8.3 12/20/2021    BILITOT 0.2 12/20/2021    ALKPHOS 107 12/20/2021    AST 9 12/20/2021    ALT 6 12/20/2021       Troponin:  Lab Results   Component Value Date    TROPONINT 0.031 12/20/2021     Radiology results:  XR CHEST PORTABLE   Final Result   1. Bilateral pulmonary opacities and bilateral pleural effusions, which could   be due to edema or pneumonia. 2. Cardiomegaly. 3. Large hiatal hernia. VL DUP LOWER EXTREMITY VENOUS BILATERAL    (Results Pending)   NM LUNG VENT/PERFUSION (VQ)    (Results Pending)          Medications:   Home Medications:   Prior to Admission medications    Medication Sig Start Date End Date Taking?  Authorizing Provider   glimepiride (AMARYL) 4 MG tablet Take 1 tablet by mouth every morning (before breakfast) 11/18/21   Edda Goodwin MD   cilostazol (PLETAL) 50 MG tablet Take 1 tablet by mouth 2 times daily 10/20/21   Edda Goodwin MD   furosemide (LASIX) 40 MG tablet Take 1 tablet by mouth every morning Take with a 20 mg tablet to equal 60 mg. 10/6/21   Edda Goodwin MD   atenolol (TENORMIN) 50 MG tablet Take 1 tablet by mouth daily 10/6/21   Edda Goodwin MD   warfarin (COUMADIN) 5 MG tablet Take as directed per INR 9/17/21   Edda Goodwin MD insulin aspart (NOVOLOG FLEXPEN) 100 UNIT/ML injection pen Inject 8 Units into the skin 3 times daily (before meals) 9/10/21   Melody March MD   pantoprazole (PROTONIX) 40 MG tablet Take 40 mg by mouth daily    Historical Provider, MD   Insulin Degludec (TRESIBA FLEXTOUCH) 200 UNIT/ML SOPN Inject 25 Units into the skin nightly 6/19/21   Marge Velasquez DO   atorvastatin (LIPITOR) 80 MG tablet Take 1 tablet by mouth daily 5/12/21   Melody March MD   furosemide (LASIX) 20 MG tablet Take 1 tablet by mouth every morning Take with a 40 mg tablet to equal 60 mg. 5/12/21   Melody March MD   SITagliptin (JANUVIA) 50 MG tablet TAKE ONE TABLET BY MOUTH ONCE EVERY DAY 3/1/21   Melody March MD   glyBURIDE (DIABETA) 5 MG tablet Take 1 tablet by mouth 2 times daily 11/15/19   Melody March MD   gabapentin (NEURONTIN) 300 MG capsule Take 300 mg by mouth 3 times daily. Historical Provider, MD   ferrous sulfate 325 (65 Fe) MG tablet Take 325 mg by mouth every other day    Historical Provider, MD   oxyCODONE-acetaminophen (PERCOCET) 7.5-325 MG per tablet Take 1 tablet by mouth 2 times daily as needed for Pain. Avoid Alcohol. ..causes drowsiness. Historical Provider, MD Rayne Aguillon LANCETS FINE MISC 1 each 3 times daily and as needed. Historical Provider, MD   blood glucose test strips (ONETOUCH VERIO) strip 1 each 3 times daily and as  needed. Historical Provider, MD   simvastatin (ZOCOR) 80 MG tablet Take 80 mg by mouth nightly.       Historical Provider, MD     Medications:    cilostazol  50 mg Oral BID    [START ON 12/21/2021] furosemide  60 mg Oral Daily    pantoprazole  40 mg Oral Daily    insulin glargine  25 Units SubCUTAneous Nightly    insulin lispro  8 Units SubCUTAneous TID     sodium chloride flush  5-40 mL IntraVENous 2 times per day    insulin lispro  0-6 Units SubCUTAneous TID WC    insulin lispro  0-3 Units SubCUTAneous Nightly    heparin (porcine)  80 Units/kg IntraVENous Once      Infusions:    dextrose      sodium chloride      heparin (PORCINE) Infusion       PRN Meds: oxyCODONE-acetaminophen, 1 tablet, BID PRN  glucose, 15 g, PRN  dextrose, 12.5 g, PRN  glucagon (rDNA), 1 mg, PRN  dextrose, 100 mL/hr, PRN  sodium chloride flush, 5-40 mL, PRN  sodium chloride, 25 mL, PRN  ondansetron, 4 mg, Q8H PRN   Or  ondansetron, 4 mg, Q6H PRN  polyethylene glycol, 17 g, Daily PRN  acetaminophen, 650 mg, Q6H PRN   Or  acetaminophen, 650 mg, Q6H PRN  heparin (porcine), 80 Units/kg, PRN  heparin (porcine), 40 Units/kg, PRN        Electronically signed by Libby Mcburney, DO on 12/20/2021 at 6:34 AM      This dictation was created with voice recognition software. While attempts have been made to review the dictation as it is transcribed, on occasion the spoken word can be misinterpreted by the technology leading to omissions or inappropriate words, phrases or sentences.

## 2021-12-20 NOTE — ED NOTES
Bed: ED-28  Expected date:   Expected time:   Means of arrival:   Comments:  [address] [place] [address] [place], RN  12/19/21 2359

## 2021-12-20 NOTE — PROGRESS NOTES
Hospitalist Progress Note      Name:  Celia Jolly DOB/Age/Sex: 1929  (80 y.o. male)   MRN & CSN:  [id number] & [id number] Admission Date/Time: 2021 11:49 PM   Location:  ED28/ED-28 PCP: Robin Burch Day: 2    Assessment and Plan:     Chest pain and shortness of breath with syncope 2-3 days prior to admission    Acute respiratory failure with hypoxia  -He was on oxygen 5 L when I saw him in the ED  -He stated that he is not on home oxygen  -Addendum  -has bilateral pulmonary opacities and bilateral pleural effusions, which could be due to edema or pneumonia\" - will sign out to next provider      Acute right lower extremity DVT  -Extensive, involves the femoral, popliteal, and posterior tibial veins  -Continue heparin drip    History of PE-VQ scan is negative    Chest pain  -Minimally elevated troponin  -Appreciate cardiology consult    CAD  -Per Dr. Corrina Fagan consult note dated October 10, 2012: Status post prior percutaneous transluminal coronary angioplasty and stents    Syncope  -He had syncope 2 to 3 days prior to arrival    History of right carotid endarterectomy in   -Patient reports that the artery in his right neck \" collapsed\", I presume he means that it is now occluded-we will obtain carotid ultrasound  -Per vascular surgery office consult note 2016, patient was referred to vascular surgery by Dr. Yaa Miller to discuss carotid artery stenosis per ultrasound. Ultrasound result not available in the chart. The plan at that time per vascular surgeon was to obtain a carotid artery angiogram.  Apparently this has not been done.  -We will obtain carotid ultrasound      Chronic kidney disease stage IV  -Hyponatremia noted  -Impression in ED was acute pulmonary edema      Other problems    Diabetes mellitus type 2  Hypertension  Hyperlipidemia  Body mass index is 27.37 kg/m².     Stool for occult blood    Postherpetic neuralgia right chest    Diabetes mellitus type 2    Note updated on December 21    Subjective:     Patient was seen in the ED in room 28. Patient appeared comfortable. He was sitting up in bed and talking without any dyspnea. Chart reviewed. He has been referred to nephrology several times, but has not followed up. Objective:   No intake or output data in the 24 hours ending 12/20/21 0847   Vitals:   Vitals:    12/20/21 0703   BP: 108/77   Pulse: 85   Resp:    Temp:    SpO2: 99%         Labs:   CBC:   Lab Results   Component Value Date    WBC 9.6 12/20/2021    HGB 9.9 12/20/2021    HCT 33.6 12/20/2021    MCV 92.8 12/20/2021     12/20/2021     BMP:   Lab Results   Component Value Date     12/20/2021    K 4.6 12/20/2021    CL 95 12/20/2021    CO2 28 12/20/2021    PHOS 4.1 09/01/2016    BUN 33 12/20/2021    CREATININE 2.3 12/20/2021    CALCIUM 8.3 12/20/2021       Physical Exam:       Physical Exam  HENT:      Nose: No rhinorrhea. Pulmonary:      Effort: Pulmonary effort is normal.   Skin:     Coloration: Skin is not jaundiced. Neurological:      Mental Status: He is alert. Cranial Nerves: No cranial nerve deficit.          Medications:   Medications:    cilostazol  50 mg Oral BID    [START ON 12/21/2021] furosemide  60 mg Oral Daily    pantoprazole  40 mg Oral Daily    insulin glargine  25 Units SubCUTAneous Nightly    insulin lispro  8 Units SubCUTAneous TID WC    sodium chloride flush  5-40 mL IntraVENous 2 times per day    insulin lispro  0-6 Units SubCUTAneous TID WC    insulin lispro  0-3 Units SubCUTAneous Nightly    heparin (porcine)  80 Units/kg IntraVENous Once    warfarin  5 mg Oral Daily      Infusions:    dextrose      sodium chloride      heparin (PORCINE) Infusion       PRN Meds: oxyCODONE-acetaminophen, 1 tablet, BID PRN  glucose, 15 g, PRN  dextrose, 12.5 g, PRN  glucagon (rDNA), 1 mg, PRN  dextrose, 100 mL/hr, PRN  sodium chloride flush, 5-40 mL, PRN  sodium chloride, 25 mL,

## 2021-12-20 NOTE — ED TRIAGE NOTES
Pt to ED via EMS for c/o chest pain and SOB.   Pt states he went to lay down and started having pain on his right side and became SOB

## 2021-12-20 NOTE — CONSULTS
Endocrinology   Consult Note    Dear Doctor Sunil Garza    Thank You for the Consult     Pt. Was Admitted for : Acute respiratory failure hypoxia    Reason for Consult: Better control of blood glucose      History Obtained From:  Patient/ EMR       HISTORY OF PRESENT ILLNESS:                The patient is a 80 y.o. male with significant past medical history of CAD, diabetes mellitus, hypertension, hernia repair of abdomen, carotid endarterectomy, CAD and PTCA with a stent, been complaining of shortness of breath with with hypoxia and concern of pulmonary emboli cannot have CT scan done because of the kidney problems patient started on anticoagulation. I was  consulted for better control of blood glucose. ROS:   Pt's ROS done in detail. Abnormal ROS are noted in Medical and Surgical History Section below: Other Medical History:        Diagnosis Date    CAD (coronary artery disease)     Diabetes mellitus ([address]     Hyperlipidemia     Hypertension      Surgical History:        Procedure Laterality Date    ABDOMEN SURGERY      hernia repair    CAROTID ENDARTERECTOMY  2008    Right    CORONARY ANGIOPLASTY WITH STENT PLACEMENT      2 stents    INGUINAL HERNIA REPAIR Left        Allergies:  Patient has no known allergies. Family History:       Problem Relation Age of Onset    No Known Problems Mother     Stroke Father      REVIEW OF SYSTEMS:  Review of System Done as noted above     PHYSICAL EXAM:      Vitals:    /89   Pulse 66   Temp 98.9 °F (37.2 °C) (Oral)   Resp 19   Ht 5' 8\" (1.727 m)   Wt 180 lb (81.6 kg)   SpO2 100%   BMI 27.37 kg/m²     CONSTITUTIONAL:  awake, alert, cooperative, appears stated age   EYES:  vision intact Fundoscopic Exam not performed   ENT:Normal  NECK:  Supple, No JVD.    Thyroid Exam:Normal   LUNGS:  Has Vesicular Breath Sounds, diminished breath sounds  CARDIOVASCULAR:  Normal apical impulse, regular rate and rhythm, normal S1 and S2, no S3 or S4, and has no insulin lispro  0-6 Units SubCUTAneous TID     warfarin  5 mg Oral Daily    insulin lispro  0-3 Units SubCUTAneous 2 times per day    insulin lispro  5 Units SubCUTAneous TID       Infusions:    dextrose      sodium chloride      heparin (PORCINE) Infusion 18 Units/kg/hr (12/20/21 6456)         IMPRESSION    Patient Active Problem List   Diagnosis    Bilateral carotid artery stenosis    Essential hypertension    Other hyperlipidemia    Other dysphagia    Stage 4 chronic kidney disease (HCC)    GERD (gastroesophageal reflux disease)    Type 2 diabetes mellitus with kidney complication, with long-term current use of insulin (HCC)    Acute respiratory failure with hypoxemia (HCC)    Hypoxia    Hyponatremia    Acute pulmonary edema (HCC)         RECOMMENDATIONS:      1. Reviewed POC blood glucose . Labs and X ray results   2. Reviewed Home and Current Medicines   3. Will Start On meal/ Correction bolus Humalog/ Lantus Insulin regime   4. Monitor Blood glucose frequently   5. Modify  the dose of Insulin  as needed        Will follow with you  Again thank you for sharing pt's care with me.      Truly yours,       William Schultz MD

## 2021-12-20 NOTE — CONSULTS
PHARMACY ANTICOAGULATION MONITORING SERVICE    Florian Lakhani is a 80 y.o. male on warfarin therapy for Hx of PE, DVT. Pharmacy consulted by Dr. Petra Page for monitoring and adjustment of treatment. Indication for anticoagulation: Hx of PE, DVT  INR goal: 2-3  Warfarin dose prior to admission: 5mg daily    Pertinent Laboratory Values   Recent Labs     12/20/21  0025   INR 1.38   HGB 10.6*   HCT 35.4*          Assessment/Plan:  Drug Interactions:   Home meds:  cilostazol  Pt to start a heparin drip bridge to warfarin  INR subtherapeutic on admission. Inconsistent refill history suggests possible noncompliance. Resume patient's home warfarin dose of 5mg daily and follow INR trends. Pharmacy will continue to monitor and adjust warfarin therapy as indicated    Thank you for the consult. Leeanna Cardenas, [facility]  12/20/2021 7:17 AM

## 2021-12-20 NOTE — ED PROVIDER NOTES
Triage Chief Complaint:   Chest Pain and Shortness of Breath    [place]:  Otoniel Long is a 80 y.o. male that presents with right-sided chest pain and shortness of breath. He states he has pain over his right ribs at the location that he is has had shingles. He has had difficulty for 5 to 6 years. The pain over his ribs increases with a deep breath. He started having shortness of breath today. He denies any cough or fevers. He is on Coumadin. He has a history of pulmonary embolism 11 years ago. He states he has chronic lower extremity swelling which is only mild and not significantly changed. He denies any abdominal pain. He denies any recent medication changes. No rhinorrhea or sore throat. No known sick contacts. He did receive the COVID vaccination. Patient lives with his son. ROS:   Review of Systems   Constitutional: Negative for chills and fever. HENT: Negative for congestion, rhinorrhea and sore throat. Eyes: Negative for redness and visual disturbance. Respiratory: Positive for shortness of breath. Negative for cough. Cardiovascular: Positive for chest pain (over right lower ribs) and leg swelling (mild, chronic, bilateral). Gastrointestinal: Negative for abdominal pain, nausea and vomiting. Genitourinary: Negative for dysuria and frequency. Musculoskeletal: Negative for arthralgias and back pain. Skin: Negative for rash and wound. Neurological: Negative for syncope and headaches. Psychiatric/Behavioral: Negative for hallucinations and suicidal ideas.        Past Medical History:   Diagnosis Date    CAD (coronary artery disease)     Diabetes mellitus ([facility] [address]     Hyperlipidemia     Hypertension      Past Surgical History:   Procedure Laterality Date    ABDOMEN SURGERY      hernia repair    CAROTID ENDARTERECTOMY  2008    Right    CORONARY ANGIOPLASTY WITH STENT PLACEMENT      2 stents    INGUINAL HERNIA REPAIR Left      Family History   Problem Relation Age of Onset  No Known Problems Mother     Stroke Father      Social History     Socioeconomic History    Marital status:      Spouse name: Not on file    Number of children: Not on file    Years of education: Not on file    Highest education level: Not on file   Occupational History    Not on file   Tobacco Use    Smoking status: Never Smoker    Smokeless tobacco: Never Used   Substance and Sexual Activity    Alcohol use: No    Drug use: No    Sexual activity: Not on file   Other Topics Concern    Not on file   Social History Narrative    Not on file     Social Determinants of Health     Financial Resource Strain:     Difficulty of Paying Living Expenses: Not on file   Food Insecurity:     Worried About Running Out of Food in the Last Year: Not on file    Finn of Food in the Last Year: Not on file   Transportation Needs:     Lack of Transportation (Medical): Not on file    Lack of Transportation (Non-Medical):  Not on file   Physical Activity:     Days of Exercise per Week: Not on file    Minutes of Exercise per Session: Not on file   Stress:     Feeling of Stress : Not on file   Social Connections:     Frequency of Communication with Friends and Family: Not on file    Frequency of Social Gatherings with Friends and Family: Not on file    Attends Yazidi Services: Not on file    Active Member of [address] [place] [address] Employee Benefit Plans or Organizations: Not on file    Attends Club or Organization Meetings: Not on file    Marital Status: Not on file   Intimate Partner Violence:     Fear of Current or Ex-Partner: Not on file    Emotionally Abused: Not on file    Physically Abused: Not on file    Sexually Abused: Not on file   Housing Stability:     Unable to Pay for Housing in the Last Year: Not on file    Number of [place] in the Last Year: Not on file    Unstable Housing in the Last Year: Not on file     Current Facility-Administered Medications   Medication Dose Route Frequency Provider Last Rate Last Admin  glipiZIDE (GLUCOTROL) tablet 5 mg  5 mg Oral QAM AC M Nanda Osuna MD        cilostazol (PLETAL) tablet 50 mg  50 mg Oral BID [facility], DO   50 mg at 12/22/21 7281    furosemide (LASIX) tablet 60 mg  60 mg Oral Daily [facility], DO   60 mg at 12/22/21 6298    pantoprazole (PROTONIX) tablet 40 mg  40 mg Oral Daily [facility], DO   40 mg at 12/22/21 0904    oxyCODONE-acetaminophen (PERCOCET) 7.5-325 MG per tablet 1 tablet  1 tablet Oral BID PRN [facility], DO        [Held by provider] insulin glargine (LANTUS) injection vial 25 Units  25 Units SubCUTAneous Nightly Long Garrison MD   25 Units at 12/20/21 2218    glucose (GLUTOSE) 40 % oral gel 15 g  15 g Oral PRN [facility], DO        dextrose 50 % IV solution  12.5 g IntraVENous PRN [facility], DO        glucagon (rDNA) injection 1 mg  1 mg IntraMUSCular PRN [facility], DO        dextrose 5 % solution  100 mL/hr IntraVENous PRN [facility], DO        sodium chloride flush 0.9 % injection 5-40 mL  5-40 mL IntraVENous 2 times per day [facility], DO   10 mL at 12/20/21 2049    sodium chloride flush 0.9 % injection 5-40 mL  5-40 mL IntraVENous PRN Sulaiman Sanchez, DO        0.9 % sodium chloride infusion  25 mL IntraVENous PRN [facility], DO        ondansetron (ZOFRAN-ODT) disintegrating tablet 4 mg  4 mg Oral Q8H PRN [facility], DO        Or    ondansetron (ZOFRAN) injection 4 mg  4 mg IntraVENous Q6H PRN [facility], DO        polyethylene glycol (GLYCOLAX) packet 17 g  17 g Oral Daily PRN [facility], DO   17 g at 12/21/21 1801    acetaminophen (TYLENOL) tablet 650 mg  650 mg Oral Q6H PRN [facility], DO        Or    acetaminophen (TYLENOL) suppository 650 mg  650 mg Rectal Q6H PRN [facility], DO        insulin lispro (HUMALOG) injection vial 0-6 Units  0-6 Units SubCUTAneous TID [facility], DO   1 Units at 12/21/21 1801    heparin (porcine) injection 6,530 Units  80 Units/kg IntraVENous PRN [facility], DO        heparin (porcine) injection 3,260 Units  40 Units/kg IntraVENous PRN [facility], DO        warfarin (COUMADIN) tablet 5 mg  5 mg Oral Daily [facility], DO   5 mg at 12/21/21 1801    heparin 25,000 unit in sodium chloride 0.45% 250 mL (premix) infusion  5-30 Units/kg/hr IntraVENous Continuous [facility], DO 9.8 mL/hr at 12/22/21 0105 12 Units/kg/hr at 12/22/21 0105    insulin lispro (HUMALOG) injection vial 0-3 Units  0-3 Units SubCUTAneous 2 times per day Demetrius Hayes MD   1 Units at 12/20/21 2219    [Held by provider] insulin lispro (HUMALOG) injection vial 5 Units  5 Units SubCUTAneous TID [facility] Demetrius Hayes MD   5 Units at 12/21/21 1803     No Known Allergies    Nursing Notes Reviewed     Physical Exam:   ED Triage Vitals [12/19/21 2342]   Enc Vitals Group      BP (!) 143/77      Pulse 102      Resp 24      Temp 98.1 °F (36.7 °C)      Temp Source Oral      SpO2 (!) 85 %      Weight       Height       Head Circumference       Peak Flow       Pain Score       Pain Loc       Pain Edu? Excl. in [address]? BP (!) 103/52   Pulse 79   Temp 98.5 °F (36.9 °C) (Oral)   Resp 14   Ht 5' 8\" (1.727 m)   Wt 168 lb 14.4 oz (76.6 kg)   SpO2 92%   BMI 25.68 kg/m²   My pulse ox interpretation is - abnormal  Physical Exam  Constitutional:       General: He is not in acute distress. Appearance: He is not diaphoretic. Comments: Elderly appearing     HENT:      Head: Normocephalic and atraumatic. Eyes:      General:         Right eye: No discharge. Left eye: No discharge. Conjunctiva/sclera: Conjunctivae normal.   Cardiovascular:      Rate and Rhythm: Normal rate and regular rhythm. Pulses: Normal pulses. Radial pulses are 2+ on the right side and 2+ on the left side. Pulmonary:      Effort: Pulmonary effort is normal. No respiratory distress. Breath sounds: Normal breath sounds. No wheezing or rales.       Comments: Requiring 5L supplemental oxygen via NC  Chest:      Chest wall: Tenderness present. Abdominal:      General: There is no distension. Tenderness: There is no abdominal tenderness. There is no guarding or rebound. Musculoskeletal:         General: No swelling or tenderness. Normal range of motion. Right lower leg: Edema (trace) present. Left lower leg: Edema (trace) present. Skin:     General: Skin is warm and dry. Neurological:      General: No focal deficit present. Mental Status: He is alert. Cranial Nerves: No cranial nerve deficit.    Psychiatric:         Mood and Affect: Mood normal.         Behavior: Behavior normal.         I have reviewed and interpreted all of the currently available lab results from this visit (if applicable):  Results for orders placed or performed during the hospital encounter of 12/19/21   Respiratory Panel, Molecular, with COVID-19 (Restricted: peds pts or suitable admitted adults)    Specimen: Nasopharyngeal   Result Value Ref Range    Adenovirus Detection by PCR NOT DETECTED NOT DETECTED    Coronavirus 229E PCR NOT DETECTED NOT DETECTED    Coronavirus HKU1 PCR NOT DETECTED NOT DETECTED    Coronavirus NL63 PCR NOT DETECTED NOT DETECTED    Coronavirus OC43 PCR NOT DETECTED NOT DETECTED    SARS-CoV-2 NOT DETECTED NOT DETECTED    Human Metapneumovirus PCR NOT DETECTED NOT DETECTED    Rhinovirus Enterovirus PCR NOT DETECTED NOT DETECTED    Influenza A by PCR NOT DETECTED NOT DETECTED    Influenza A H1 Pandemic PCR NOT DETECTED NOT DETECTED    Influenza A H1 (2009) PCR NOT DETECTED NOT DETECTED    Influenza A H3 PCR NOT DETECTED NOT DETECTED    Influenza B by PCR NOT DETECTED NOT DETECTED    Parainfluenza 1 PCR NOT DETECTED NOT DETECTED    Parainfluenza 2 PCR NOT DETECTED NOT DETECTED    Parainfluenza 3 PCR NOT DETECTED NOT DETECTED    Parainfluenza 4 PCR NOT DETECTED NOT DETECTED    RSV PCR NOT DETECTED NOT DETECTED    Bordetella parapertussis by PCR NOT DETECTED NOT DETECTED    B Pertussis by PCR NOT DETECTED NOT DETECTED    Chlamydophila Pneumonia PCR NOT DETECTED NOT DETECTED    Mycoplasma pneumo by PCR NOT DETECTED NOT DETECTED   CBC Auto Differential   Result Value Ref Range    WBC 9.7 4.0 - 10.5 K/CU MM    RBC 3.88 (L) 4.6 - 6.2 M/CU MM    Hemoglobin 10.6 (L) 13.5 - 18.0 GM/DL    Hematocrit 35.4 (L) 42 - 52 %    MCV 91.2 78 - 100 FL    MCH 27.3 27 - 31 PG    MCHC 29.9 (L) 32.0 - 36.0 %    RDW 14.1 11.7 - 14.9 %    Platelets 455 210 - 859 K/CU MM    MPV 10.1 7.5 - 11.1 FL    Differential Type AUTOMATED DIFFERENTIAL     Segs Relative 72.1 (H) 36 - 66 %    Lymphocytes % 13.7 (L) 24 - 44 %    Monocytes % 7.3 (H) 0 - 4 %    Eosinophils % 5.8 (H) 0 - 3 %    Basophils % 0.6 0 - 1 %    Segs Absolute 7.0 K/CU MM    Lymphocytes Absolute 1.3 K/CU MM    Monocytes Absolute 0.7 K/CU MM    Eosinophils Absolute 0.6 K/CU MM    Basophils Absolute 0.1 K/CU MM    Nucleated RBC % 0.0 %    Total Nucleated RBC 0.0 K/CU MM    Total Immature Neutrophil 0.05 K/CU MM    Immature Neutrophil % 0.5 (H) 0 - 0.43 %   Comprehensive Metabolic Panel w/ Reflex to MG   Result Value Ref Range    Sodium 134 (L) 135 - 145 MMOL/L    Potassium 4.6 3.5 - 5.1 MMOL/L    Chloride 95 (L) 99 - 110 mMol/L    CO2 28 21 - 32 MMOL/L    BUN 33 (H) 6 - 23 MG/DL    CREATININE 2.3 (H) 0.9 - 1.3 MG/DL    Glucose 151 (H) 70 - 99 MG/DL    Calcium 8.3 8.3 - 10.6 MG/DL    Albumin 3.1 (L) 3.4 - 5.0 GM/DL    Total Protein 7.2 6.4 - 8.2 GM/DL    Total Bilirubin 0.2 0.0 - 1.0 MG/DL    ALT 6 (L) 10 - 40 U/L    AST 9 (L) 15 - 37 IU/L    Alkaline Phosphatase 107 40 - 128 IU/L    GFR Non- 27 (L) >60 mL/min/1.73m2    GFR  32 (L) >60 mL/min/1.73m2    Anion Gap 11 4 - 16   Troponin   Result Value Ref Range    Troponin T 0.031 (H) <0.01 NG/ML   Brain Natriuretic Peptide   Result Value Ref Range    Pro-BNP 1,127 (H) <300 PG/ML   PT - INR   Result Value Ref Range    Protime 17.9 (H) 11.7 - 14.5 SECONDS    INR 1.38 INDEX   Troponin   Result Value Ref Range    Troponin T 0.046 (H) <0.01 NG/ML   Troponin   Result Value Ref Range    Troponin T 0.052 (H) <0.01 NG/ML   D-dimer, quantitative   Result Value Ref Range    D-Dimer, Quant 3267 (H) <230 NG/mL(DDU)   CBC   Result Value Ref Range    WBC 9.6 4.0 - 10.5 K/CU MM    RBC 3.62 (L) 4.6 - 6.2 M/CU MM    Hemoglobin 9.9 (L) 13.5 - 18.0 GM/DL    Hematocrit 33.6 (L) 42 - 52 %    MCV 92.8 78 - 100 FL    MCH 27.3 27 - 31 PG    MCHC 29.5 (L) 32.0 - 36.0 %    RDW 14.2 11.7 - 14.9 %    Platelets 775 408 - 327 K/CU MM    MPV 9.5 7.5 - 11.1 FL   APTT   Result Value Ref Range    aPTT 30.2 25.1 - 37.1 SECONDS   APTT   Result Value Ref Range    aPTT 172.0 (H) 25.1 - 37.1 SECONDS   Hemoglobin A1c   Result Value Ref Range    Hemoglobin A1C 9.3 (H) 4.2 - 6.3 %    eAG 220 mg/dL   Basic Metabolic Panel w/ Reflex to MG   Result Value Ref Range    Sodium 133 (L) 135 - 145 MMOL/L    Potassium 4.3 3.5 - 5.1 MMOL/L    Chloride 95 (L) 99 - 110 mMol/L    CO2 28 21 - 32 MMOL/L    Anion Gap 10 4 - 16    BUN 38 (H) 6 - 23 MG/DL    CREATININE 2.5 (H) 0.9 - 1.3 MG/DL    Glucose 121 (H) 70 - 99 MG/DL    Calcium 8.0 (L) 8.3 - 10.6 MG/DL    GFR Non- 24 (L) >60 mL/min/1.73m2    GFR  29 (L) >60 mL/min/1.73m2   CBC   Result Value Ref Range    WBC 7.3 4.0 - 10.5 K/CU MM    RBC 3.36 (L) 4.6 - 6.2 M/CU MM    Hemoglobin 9.3 (L) 13.5 - 18.0 GM/DL    Hematocrit 31.4 (L) 42 - 52 %    MCV 93.5 78 - 100 FL    MCH 27.7 27 - 31 PG    MCHC 29.6 (L) 32.0 - 36.0 %    RDW 13.9 11.7 - 14.9 %    Platelets 580 805 - 974 K/CU MM    MPV 9.9 7.5 - 11.1 FL   Protime-INR   Result Value Ref Range    Protime 18.0 (H) 11.7 - 14.5 SECONDS    INR 1.39 INDEX   APTT   Result Value Ref Range    aPTT 82.9 (H) 25.1 - 37.1 SECONDS   APTT   Result Value Ref Range    aPTT 124.0 (H) 25.1 - 37.1 SECONDS   Hemoglobin A1c   Result Value Ref Range    Hemoglobin A1C 9.0 (H) 4.2 - 6.3 %    eAG 212 mg/dL Blood gas, arterial   Result Value Ref Range    pH, Bld 7.37 7.34 - 7.45    pCO2, Arterial 49.0 (H) 32 - 45 MMHG    pO2, Arterial 59 (L) 75 - 100 MMHG    Base Exc, Mixed 2.2 (H) 0 - 1.2    HCO3, Arterial 28.3 (H) 18 - 23 MMOL/L    CO2 Content 29.8 (H) 19 - 24 MMOL/L    O2 Sat 89.7 (L) 96 - 97 %    Carbon Monoxide, Blood 3.3 0 - 5 %    Methemoglobin, Arterial 2.7 (H) <1.5 %    Comment 6L NC TURNED DOWN TO 3L NC    APTT   Result Value Ref Range    aPTT 92.9 (H) 25.1 - 37.1 SECONDS   CBC   Result Value Ref Range    WBC 6.5 4.0 - 10.5 K/CU MM    RBC 3.45 (L) 4.6 - 6.2 M/CU MM    Hemoglobin 9.5 (L) 13.5 - 18.0 GM/DL    Hematocrit 32.5 (L) 42 - 52 %    MCV 94.2 78 - 100 FL    MCH 27.5 27 - 31 PG    MCHC 29.2 (L) 32.0 - 36.0 %    RDW 14.1 11.7 - 14.9 %    Platelets 309 489 - 620 K/CU MM    MPV 10.3 7.5 - 11.1 FL   APTT   Result Value Ref Range    aPTT 73.9 (H) 25.1 - 37.1 SECONDS   C-reactive protein   Result Value Ref Range    CRP, High Sensitivity 108.7 mg/L   Procalcitonin   Result Value Ref Range    Procalcitonin 0.668    Basic metabolic panel   Result Value Ref Range    Sodium 138 135 - 145 MMOL/L    Potassium 4.7 3.5 - 5.1 MMOL/L    Chloride 100 99 - 110 mMol/L    CO2 27 21 - 32 MMOL/L    Anion Gap 11 4 - 16    BUN 35 (H) 6 - 23 MG/DL    CREATININE 2.3 (H) 0.9 - 1.3 MG/DL    Glucose 69 (L) 70 - 99 MG/DL    Calcium 8.4 8.3 - 10.6 MG/DL    GFR Non- 27 (L) >60 mL/min/1.73m2    GFR  32 (L) >60 mL/min/1.73m2   Protime-INR   Result Value Ref Range    Protime 19.7 (H) 11.7 - 14.5 SECONDS    INR 1.52 INDEX   POCT Glucose   Result Value Ref Range    POC Glucose 146 (H) 70 - 99 MG/DL   POCT Glucose   Result Value Ref Range    POC Glucose 137 (H) 70 - 99 MG/DL   POCT Glucose   Result Value Ref Range    POC Glucose 131 (H) 70 - 99 MG/DL   POCT Glucose   Result Value Ref Range    POC Glucose 222 (H) 70 - 99 MG/DL   POCT Glucose   Result Value Ref Range    POC Glucose 110 (H) 70 - 99 MG/DL   POCT Glucose   Result Value Ref Range    POC Glucose 65 (L) 70 - 99 MG/DL   POCT Glucose   Result Value Ref Range    POC Glucose 104 (H) 70 - 99 MG/DL   POCT Glucose   Result Value Ref Range    POC Glucose 166 (H) 70 - 99 MG/DL   POCT Glucose   Result Value Ref Range    POC Glucose 186 (H) 70 - 99 MG/DL   POCT Glucose   Result Value Ref Range    POC Glucose 110 (H) 70 - 99 MG/DL   POCT Glucose   Result Value Ref Range    POC Glucose 105 (H) 70 - 99 MG/DL   POCT Glucose   Result Value Ref Range    POC Glucose 80 70 - 99 MG/DL   POCT Glucose   Result Value Ref Range    POC Glucose 73 70 - 99 MG/DL   EKG 12 Lead   Result Value Ref Range    Ventricular Rate 90 BPM    Atrial Rate 90 BPM    P-R Interval 188 ms    QRS Duration 92 ms    Q-T Interval 364 ms    QTc Calculation (Bazett) 445 ms    P Axis 35 degrees    R Axis -21 degrees    T Axis 31 degrees    Diagnosis       Normal sinus rhythm  Minimal voltage criteria for LVH, may be normal variant  Borderline ECG  No previous ECGs available  Confirmed by [facility] ([place], [place]) Raul GARZON ([id number]) on 12/20/2021 6:34:15 PM        Radiographs (if obtained):  [] The following radiograph was interpreted by myself in the absence of a radiologist:  [x]Radiologist's Report Reviewed:  CT CHEST WO CONTRAST   Final Result   Moderate size left pleural effusion and trace right pleural effusion with   bibasilar atelectasis/pneumonitis. 13 mm spiculated nodular density in the anterior aspect left upper lobe. Although focal area of pneumonitis is possible, malignancy also possible. Of   the options below, would recommend short-term follow-up chest CT within 3   months to re-evaluate. Atherosclerosis. Large hiatal hernia. RECOMMENDATIONS:   Fleischner Society guidelines for follow-up and management of incidentally   detected pulmonary nodules:      Single Solid Nodule: In a low-risk patient, consider CT at 3 months, PET/CT, or tissue sampling.       In a high-risk patient, consider CT at 3 months, PET/CT, or tissue sampling.      - Low risk patients include individuals with minimal or absent history of   smoking and other known risk factors. - High risk patients include individuals with a history or smoking or known   risk factors. Radiology 2017 http://pubs. rsna.org/doi/full/10.1148/radiol. 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 The left internal carotid artery demonstrates 50-69% stenosis. Bilateral vertebral arteries are patent with flow in the normal direction. NM LUNG VENT/PERFUSION (VQ)   Final Result   Low probability for pulmonary embolism. VL DUP LOWER EXTREMITY VENOUS BILATERAL   Final Result   1. DVT demonstrated in the right lower extremity involving the femoral,   popliteal and posterior tibial veins. 2. No evidence of DVT in the left lower extremity. Findings discussed with Dr Paul Short on 12/20/2021 at 10:15 a.m.         XR CHEST PORTABLE   Final Result   1. Bilateral pulmonary opacities and bilateral pleural effusions, which could   be due to edema or pneumonia. 2. Cardiomegaly. 3. Large hiatal hernia. XR CHEST PORTABLE    (Results Pending)         EKG (if obtained): (All EKG's are interpreted by myself in the absence of a cardiologist)  Normal sinus rhythm with a rate of 90. FL interval 188, QRS 92, QTc 445. No ST elevations or depressions. Normal T waves. Questionable left ventricular hypertrophy. Artifact along the baseline. Impression: Nonspecific EKG. When compared to previous EKG from 4/18/2015, there are no significant changes. MDM:  Differential diagnoses considered include but are not limited to CHF exacerbation, COPD exacerbation, pneumonia, viral URI, COVID-19, deconditioning, pulmonary embolism.     Upon arrival patient appears comfortable but is requiring 5 L of oxygen via nasal cannula to maintain oxygen saturation in the 90s. Basic labs are obtained and show a creatinine level of 2.3 though this is improved from previous. His troponin is detectable which is new. BNP is elevated. Chest x-ray shows bilateral pulmonary opacities and bilateral pleural effusions which could be due to edema or pneumonia. As patient has not had a cough or fever, I am most suspicious for edema. Evaluation suspicious for CHF exacerbation. The patient a dose of Lasix and admitted to the hospital for further evaluation and treatment given his hypoxia. Patient also has a history of pulmonary embolism. Unable to obtain a CTA given his elevated renal function. I am unsure patient can stand her VQ scan. Discussed this issue with hospitalist who will determine whether or not patient needs to be started on heparin drip. I spoke with Dr. Yamilet Chavarria, who graciously agreed to admit the patient. Plan of care explained to patient. All questions and concerns were addressed to the patient's satisfaction. Patient understood and agreed with plan. I did don appropriate PPE (including face mask, protective eye ware/safety glasses and gloves), as recommended by the health facility/national standard best practice, during my bedside interactions with the patient. Clinical Impression:  1. Acute pulmonary edema (HCC)    2. Hypoxia          Gaetano Buck MD       Please note that portions of this note may have been complete with a voice recognition program.  Effortswere made to edit the dictations, but occasional words are mis-transcribed.           Gaetano Buck MD  12/22/21 [id number]

## 2021-12-21 NOTE — PROGRESS NOTES
Cardiology Progress Note     Today's Plan sign off   Follow as needed     Admit Date:  12/19/2021    Consult reason/ Seen today for: elevated troponin     Subjective and  Overnight Events:  Denies chest pain-states shortness of breath is improved    Telemetry SR    Assessment / Plan / Recommendation:     1. Elevated troponin - denies chest pain- peak troponin 0.052- EKG SR no acute ST or T wave abnormalities - Echo-EF normal 50-55%- no regional wall motion abnormalities - type 2 rise secondary to hypoxia/ CKD   2. Shortness of breath-- mildly elevated BNP -not significant base on age and renal function- CXR concerning for opacities with bilateral pleural effusion - given IV lasix -improvement of shortness of breath- does not appear to be in fluid overload-caution use of diuretics   3. DVT- noted of the right lower extremity- on IV heparin- continue anticoagulation   4. H/o PE- VQ scan done -low probability -echocardiogram done- no evidence of right heart strain  5. CKD- nephrology on board         History of Presenting Illness:    Chief complain on admission : 80 y. o.year old who is admitted for  Chief Complaint   Patient presents with    Chest Pain    Shortness of Breath        Past medical history:    has a past medical history of CAD (coronary artery disease), Diabetes mellitus ([address] Hyperlipidemia, and Hypertension. Past surgical history:   has a past surgical history that includes Coronary angioplasty with stent; Carotid endarterectomy (2008); Abdomen surgery; and Inguinal hernia repair (Left). Social History:   reports that he has never smoked. He has never used smokeless tobacco. He reports that he does not drink alcohol and does not use drugs. Family history:  family history includes No Known Problems in his mother; Stroke in his father.     No Known Allergies    Review of Systems:   All 14 systems were reviewed and are negative  Except for the positive findings which are documented     /63   Pulse 67   Temp 97.8 °F (36.6 °C) (Oral)   Resp 18   Ht 5' 8\" (1.727 m)   Wt 180 lb (81.6 kg)   SpO2 94%   BMI 27.37 kg/m²       Intake/Output Summary (Last 24 hours) at 12/21/2021 1125  Last data filed at 12/21/2021 0841  Gross per 24 hour   Intake 389.2 ml   Output 700 ml   Net -310.8 ml       Physical Exam:  Physical Exam  Cardiovascular:      Rate and Rhythm: Normal rate and regular rhythm. Heart sounds: Normal heart sounds. Pulmonary:      Effort: Pulmonary effort is normal.      Breath sounds: Examination of the right-lower field reveals decreased breath sounds. Examination of the left-lower field reveals decreased breath sounds. Decreased breath sounds present. Musculoskeletal:      Right lower leg: No edema. Left lower leg: No edema. Skin:     General: Skin is warm and dry. Neurological:      Mental Status: He is alert.           Medications:    cilostazol  50 mg Oral BID    furosemide  60 mg Oral Daily    pantoprazole  40 mg Oral Daily    insulin glargine  25 Units SubCUTAneous Nightly    sodium chloride flush  5-40 mL IntraVENous 2 times per day    insulin lispro  0-6 Units SubCUTAneous TID WC    warfarin  5 mg Oral Daily    insulin lispro  0-3 Units SubCUTAneous 2 times per day    insulin lispro  5 Units SubCUTAneous TID WC      sodium chloride 50 mL/hr at 12/21/21 0756    dextrose      sodium chloride      heparin (PORCINE) Infusion 14 Units/kg/hr (12/21/21 0902)     oxyCODONE-acetaminophen, glucose, dextrose, glucagon (rDNA), dextrose, sodium chloride flush, sodium chloride, ondansetron **OR** ondansetron, polyethylene glycol, acetaminophen **OR** acetaminophen, heparin (porcine), heparin (porcine)    Lab Data:  CBC:   Recent Labs     12/20/21  0025 12/20/21  0700 12/21/21  0146   WBC 9.7 9.6 7.3   HGB 10.6* 9.9* 9.3*   HCT 35.4* 33.6* 31.4*   MCV 91.2 92.8 93.5    311 279     BMP: Recent Labs     12/20/21  0025 12/21/21  0146   * 133*   K 4.6 4.3   CL 95* 95*   CO2 28 28   BUN 33* 38*   CREATININE 2.3* 2.5*     PT/INR:   Recent Labs     12/20/21  0025 12/21/21  0146   PROTIME 17.9* 18.0*   INR 1.38 1.39     BNP:    Recent Labs     12/20/21  0025   PROBNP 1,127*     TROPONIN:   Recent Labs     12/20/21  0025 12/20/21  0700 12/20/21  1308   TROPONINT 0.031* 0.046* 0.052*              Impression:  Principal Problem:    Acute respiratory failure with hypoxemia (HCC)  Active Problems:    Stage 4 chronic kidney disease (HCC)  Resolved Problems:    * No resolved hospital problems. *       All labs, medications and tests reviewed by myself, continue all other medications of all above medical condition listed as is except for changes mentioned above. Thank you   Please call with questions. Electronically signed by WHITLEY Sanchez CNP on 12/21/2021 at 11:25 AM  I have seen ,spoken to  and examined this patient personally, independently of the nurse practitioner. I have reviewed the hospital care given to date and reviewed all pertinent labs and imaging. The plan was developed mutually at the time of the visit with the patient,  NP  and myself. I have spoken with patient, nursing staff and provided written and verbal instructions . The above note has been reviewed and I agree with the assessment, diagnosis, and treatment plan with changes made by me as follows     CARDIOLOGY ATTENDING ADDENDUM    HPI:  I have reviewed the above HPI  And agree with above   Dulce Sullivan is a 80 y. o.year old who and presents with had concerns including Chest Pain and Shortness of Breath.   Chief Complaint   Patient presents with    Chest Pain    Shortness of Breath     Interval history:  Mild sob    Physical Exam:  General:  Awake, alert, NAD  Head:normal  Eye:normal  Neck:  No JVD   Chest:  Clear to auscultation, respiration easy  Cardiovascular:  RRR S1S2  Abdomen:   nontender  Extremities:  tr edema  Pulses; palpable  Neuro: grossly normal      MEDICAL DECISION MAKING;    I agree with the above plan, which was planned by myself and discussed with NP.   No RV strain        Wes Bowens MD [facility]

## 2021-12-21 NOTE — PROGRESS NOTES
Nephrology Progress Note        [address] [address]  Phone: [phone]  Office Hours: 8:30AM - 4:30PM  Monday - Friday 12/21/2021 7:13 AM  Subjective:   Admit Date: 12/19/2021  PCP: Sunni Andersen MD  Interval History: on nc    Diet: ADULT DIET; Regular; 4 carb choices (60 gm/meal); Low Phosphorus (Less than 1000 mg); 60 to 80 gm      Data:   Scheduled Meds:   cilostazol  50 mg Oral BID    furosemide  60 mg Oral Daily    pantoprazole  40 mg Oral Daily    insulin glargine  25 Units SubCUTAneous Nightly    sodium chloride flush  5-40 mL IntraVENous 2 times per day    insulin lispro  0-6 Units SubCUTAneous TID WC    warfarin  5 mg Oral Daily    insulin lispro  0-3 Units SubCUTAneous 2 times per day    insulin lispro  5 Units SubCUTAneous TID WC     Continuous Infusions:   dextrose      sodium chloride      heparin (PORCINE) Infusion 14 Units/kg/hr (12/20/21 2143)     PRN Meds:oxyCODONE-acetaminophen, glucose, dextrose, glucagon (rDNA), dextrose, sodium chloride flush, sodium chloride, ondansetron **OR** ondansetron, polyethylene glycol, acetaminophen **OR** acetaminophen, heparin (porcine), heparin (porcine)  I/O last 3 completed shifts: In: 399.2 [P.O.:360; I.V.:39.2]  Out: 400 [Urine:400]  No intake/output data recorded.     Intake/Output Summary (Last 24 hours) at 12/21/2021 0714  Last data filed at 12/21/2021 0106  Gross per 24 hour   Intake 399.2 ml   Output 400 ml   Net -0.8 ml       CBC:   Recent Labs     12/20/21  0025 12/20/21  0700 12/21/21  0146   WBC 9.7 9.6 7.3   HGB 10.6* 9.9* 9.3*    311 279       BMP:    Recent Labs     12/20/21  0025 12/21/21  0146   * 133*   K 4.6 4.3   CL 95* 95*   CO2 28 28   BUN 33* 38*   CREATININE 2.3* 2.5*   GLUCOSE 151* 121*     Hepatic:   Recent Labs     12/20/21  0025   AST 9*   ALT 6*   BILITOT 0.2   ALKPHOS 107         Objective:   Vitals: BP (!) 160/68   Pulse 69   Temp 98 °F (36.7 °C) (Oral)   Resp 16   Ht 5' 8\" (1.727 m)   Wt 180 lb (81.6 kg)   SpO2 97%   BMI 27.37 kg/m²   General appearance: alert and cooperative with exam, in no acute distress  HEENT: normocephalic, atraumatic,   Neck: supple, trachea midline  Lungs:breathing comfortably on nc  Heart[de-identified] regular rate and rhythm,   Abdomen:  non distended,  Extremities: extremities atraumatic, no cyanosis or edema  Neurologic: alert, oriented, follows commands, interactive    Assessment and Plan:     Patient Active Problem List     Diagnosis Date Noted    Acute respiratory failure with hypoxemia ([facility] 12/20/2021    GERD (gastroesophageal reflux disease) 07/30/2021    Type 2 diabetes mellitus with kidney complication, with long-term current use of insulin ([address] 07/30/2021    CKD (chronic kidney disease) stage 4, GFR 15-29 ml/min ([address] 05/13/2021    Essential hypertension 05/12/2021    Other hyperlipidemia 05/12/2021    Other dysphagia 05/12/2021    Bilateral carotid artery stenosis 08/16/2016      YASMEEN  CKD4  Hyponatremia   Acute hypoxic resp failure  Suspected PE  Mild probnp elevation    Plan;  Cr worsening  Does not look overloaded clinically, will start gentle NS                    Electronically signed by Pedro Rust DO on 12/21/2021 at 7:14 AM    [address] Meneses DO [address],  Greg Barrios  [facility], [place] [address]  PHONE: [phone]  FAX: [phone]

## 2021-12-21 NOTE — PROGRESS NOTES
Bruna Acevedo MD, [address]                Internal Medicine Hospitalist             Daily Progress  Note   Subjective:     Chief Complaint   Patient presents with    Chest Pain    Shortness of Breath     Mr. Molly Hector of nil, no shortness of breath no chest pain feels much better overall, his right leg feels heavy. Objective:    /63   Pulse 67   Temp 97.8 °F (36.6 °C) (Oral)   Resp 18   Ht 5' 8\" (1.727 m)   Wt 180 lb (81.6 kg)   SpO2 94%   BMI 27.37 kg/m²      Intake/Output Summary (Last 24 hours) at 12/21/2021 1105  Last data filed at 12/21/2021 0841  Gross per 24 hour   Intake 389.2 ml   Output 700 ml   Net -310.8 ml      Physical Exam:  Heart: Distant heart sounds  Lungs: Mostly clear to auscultation, decreased breath sounds at bases. No wheezes appreciated no crackles heard. Abdomen: Soft, non distended. Bowel sounds appreciated. No obvious liver or spleen enlargement. Non tender, no rebound noted. Extremities: Some calf tenderness right leg, trace right swelling noted, strength 5/5 both legs. CNS: Grossly intact.     Labs:  CBC with Differential:    Lab Results   Component Value Date    WBC 7.3 12/21/2021    RBC 3.36 12/21/2021    HGB 9.3 12/21/2021    HCT 31.4 12/21/2021     12/21/2021    MCV 93.5 12/21/2021    MCH 27.7 12/21/2021    MCHC 29.6 12/21/2021    RDW 13.9 12/21/2021    SEGSPCT 72.1 12/20/2021    LYMPHOPCT 13.7 12/20/2021    MONOPCT 7.3 12/20/2021    BASOPCT 0.6 12/20/2021    MONOSABS 0.7 12/20/2021    LYMPHSABS 1.3 12/20/2021    EOSABS 0.6 12/20/2021    BASOSABS 0.1 12/20/2021    DIFFTYPE AUTOMATED DIFFERENTIAL 12/20/2021     CMP:    Lab Results   Component Value Date     12/21/2021    K 4.3 12/21/2021    CL 95 12/21/2021    CO2 28 12/21/2021    BUN 38 12/21/2021    CREATININE 2.5 12/21/2021    GFRAA 29 12/21/2021    AGRATIO 0.9 05/12/2021    LABGLOM 24 12/21/2021    GLUCOSE 121 12/21/2021    PROT 7.2 12/20/2021    PROT 6.9 10/10/2012    LABALBU 3.1 12/20/2021    CALCIUM 8.0 12/21/2021    BILITOT 0.2 12/20/2021    ALKPHOS 107 12/20/2021    AST 9 12/20/2021    ALT 6 12/20/2021     Recent Labs     12/20/21  0025 12/20/21  0700 12/20/21  1308   TROPONINT 0.031* 0.046* 0.052*     No results found for: TSHHS      sodium chloride 50 mL/hr at 12/21/21 0756    dextrose      sodium chloride      heparin (PORCINE) Infusion 14 Units/kg/hr (12/21/21 0902)      cilostazol  50 mg Oral BID    furosemide  60 mg Oral Daily    pantoprazole  40 mg Oral Daily    insulin glargine  25 Units SubCUTAneous Nightly    sodium chloride flush  5-40 mL IntraVENous 2 times per day    insulin lispro  0-6 Units SubCUTAneous TID WC    warfarin  5 mg Oral Daily    insulin lispro  0-3 Units SubCUTAneous 2 times per day    insulin lispro  5 Units SubCUTAneous TID WC         Assessment:       Patient Active Problem List    Diagnosis Date Noted    Acute respiratory failure with hypoxemia (HCC) 12/20/2021    Hypoxia     Hyponatremia     Acute pulmonary edema (HCC)     GERD (gastroesophageal reflux disease) 07/30/2021    Type 2 diabetes mellitus with kidney complication, with long-term current use of insulin ([facility] 07/30/2021    Stage 4 chronic kidney disease ([facility] 05/13/2021    Essential hypertension 05/12/2021    Other hyperlipidemia 05/12/2021    Other dysphagia 05/12/2021    Bilateral carotid artery stenosis 08/16/2016       Plan:     Problems being addressed this admission:   Acute hypoxemic respiratory failure   Right lower extremity DVT  CAD /syncope  CKD 4  DM 2    Chest x-ray done on admission shows bilateral pulmonary opacities and bilateral pleural effusions which could be due to edema or pneumonia. His respite disease panel was negative so negative for SARS-CoV-2. He does not have elevated white count. I think part of it was fluid overload he feels much better now he does not have any chest pain. Also pulmonology consult was put in, will check procalcitonin.   Low suspicion for pneumonia no cough probably had acute pulmonary edema. I will check blood gas. Also awaiting CT scan of the chest without contrast results    Doppler ultrasound shows DVT demonstrated in the right lower extremity involving the femoral popliteal and posterior tibial veins no DVT in the left lower extremity on 12/20/2021 he is on IV heparin which will continue. He has been started on warfarin pharmacy to dose. His prominent BNP was 1127 some elevation of troponins but not positive. Dr. Timoteo Persaud  saw him for cardiology he wants to check echo and continue with anticoagulation for now. He has normal ejection fraction on echo. He is on IV fluids which we will have to very careful with as he might go into failure. Nephrology is following up. Continue to monitor closely    His blood sugar 65 today has been seen by endocrinology I will hold his Lantus meanwhile stay on the rest of his diabetic medication    Consultants:  Nephrology  Cardiology  Endocrinology  Pulmonology      General Orders:  Repeat basic labs again in am.  I have explained to the patient and discussed with him/her the treatment plan. The above chart was generated partly using Dragon dictation system, it may contain dictation errors given the limitations of this technology.      Marge Olson MD, Caroline Jason

## 2021-12-21 NOTE — CONSULTS
Pulmonary Consult Note      Reason for Consult: Unexplained hypoxia, shortness of breath  Requesting Physician: Oneil Hogan MD  Subjective:   CHIEF COMPLAINT :Right Pleuritic chest pain    Patient Active Problem List    Diagnosis Date Noted    [facility] 12/20/2021    Hypoxia     Hyponatremia     Acute pulmonary edema (HCC)     GERD (gastroesophageal reflux disease) 07/30/2021    Type 2 diabetes mellitus with kidney complication, with long-term current use of insulin ([facility] 07/30/2021    Stage 4 chronic kidney disease ([facility] 05/13/2021    Essential hypertension 05/12/2021    Other hyperlipidemia 05/12/2021    Other dysphagia 05/12/2021    Bilateral carotid artery stenosis 08/16/2016        HPI:                The patient is a 80 y.o. male with significant past medical history of PE, CAD, DM II, HLD, HTN  presents with complaints of Right pleuritic chest pain x 1 day. He has no fever, mild cough, little phlegm, no headaches, no n/v, no abd pain, no diarrhea,no dysuria. His troponins are mildly elevated. His CXR showed pulmonary congestion with suspected pneumonia. At this time he is on the lasix. He is in mild resp distress. He has h/o PE on Coumadin but he was subtherapeutic of 1.39. He has YASMEEN. His LE showed DVT.  He is on IV Heparin    Past Medical History:      Diagnosis Date    CAD (coronary artery disease)     Diabetes mellitus ([facility]     Hyperlipidemia     Hypertension       Past Surgical History:        Procedure Laterality Date    ABDOMEN SURGERY      hernia repair    CAROTID ENDARTERECTOMY  2008    Right    CORONARY ANGIOPLASTY WITH STENT PLACEMENT      2 stents    INGUINAL HERNIA REPAIR Left      Current Medications:     cilostazol  50 mg Oral BID    furosemide  60 mg Oral Daily    pantoprazole  40 mg Oral Daily    [Held by provider] insulin glargine  25 Units SubCUTAneous Nightly    sodium chloride flush  5-40 mL IntraVENous 2 times per day  insulin lispro  0-6 Units SubCUTAneous TID WC    warfarin  5 mg Oral Daily    insulin lispro  0-3 Units SubCUTAneous 2 times per day    insulin lispro  5 Units SubCUTAneous TID WC     Allergies:    Social History:    TOBACCO:   reports that he has never smoked. He has never used smokeless tobacco.  ETOH:   reports no history of alcohol use. Patient currently lives independently    Family History:       Problem Relation Age of Onset    No Known Problems Mother     Stroke Father        REVIEW OF SYSTEMS:    CONSTITUTIONAL:  negative for fevers, chills, diaphoresis, activity change, appetite change, fatigue, night sweats and unexpected weight change. EYES:  negative for blurred vision, eye discharge, visual disturbance and icterus  HEENT:  negative for hearing loss, tinnitus, ear drainage, sinus pressure, nasal congestion, epistaxis and snoring  RESPIRATORY:  See HPI  CARDIOVASCULAR:  negative for chest pain, palpitations, exertional chest pressure/discomfort, edema, syncope  GASTROINTESTINAL:  negative for nausea, vomiting, diarrhea, constipation, blood in stool and abdominal pain  GENITOURINARY:  negative for frequency, dysuria, urinary incontinence, decreased urine volume, and hematuria  HEMATOLOGIC/LYMPHATIC:  negative for easy bruising, bleeding and lymphadenopathy  ALLERGIC/IMMUNOLOGIC:  negative for recurrent infections, angioedema, anaphylaxis and drug reactions  ENDOCRINE:  negative for weight changes and diabetic symptoms including polyuria, polydipsia and polyphagia  MUSCULOSKELETAL:  negative for  pain, joint swelling, decreased range of motion and muscle weakness  NEUROLOGICAL:  negative for headaches, slurred speech, unilateral weakness  PSYCHIATRIC/BEHAVIORAL: negative for hallucinations, behavioral problems, confusion and agitation.      Objective:   PHYSICAL EXAM:      VITALS:  /63   Pulse 67   Temp 97.8 °F (36.6 °C) (Oral)   Resp 18   Ht 5' 8\" (1.727 m)   Wt 180 lb (81.6 kg) SpO2 93%   BMI 27.37 kg/m²   24HR INTAKE/OUTPUT:      Intake/Output Summary (Last 24 hours) at 2021 1419  Last data filed at 2021 1301  Gross per 24 hour   Intake 389.2 ml   Output 950 ml   Net -560.8 ml     CURRENT PULSE OXIMETRY:  SpO2: 93 %  24HR PULSE OXIMETRY RANGE:  SpO2  Av.3 %  Min: 93 %  Max: 100 %    CONSTITUTIONAL:  awake, alert, cooperative, no apparent distress, and appears stated age  NECK:  Supple, symmetrical, trachea midline, no adenopathy, thyroid symmetric, not enlarged and no tenderness, skin normal  LUNGS:Occasional basal crackles  CARDIOVASCULAR:  normal S1 and S2, no edema and no JVD  ABDOMEN:  normal bowel sounds, non-distended and no masses palpated, and no tenderness to palpation. No hepatospleenomegaly  LYMPHADENOPATHY:  no axillary or supraclavicular adenopathy. No cervical adnenopathy  PSYCHIATRIC: Oriented to person place and time. No obvious depression or anxiety. MUSCULOSKELETAL: No obvious misalignment or effusion of the joints. No clubbing, cyanosis of the digits. SKIN:  normal skin color, texture, turgor and no redness, warmth, or swelling.  No palpable nodules    DATA:    Old records have been reviewed  CBC with Differential:    Lab Results   Component Value Date    WBC 7.3 2021    RBC 3.36 2021    HGB 9.3 2021    HCT 31.4 2021     2021    MCV 93.5 2021    MCH 27.7 2021    MCHC 29.6 2021    RDW 13.9 2021    SEGSPCT 72.1 2021    LYMPHOPCT 13.7 2021    MONOPCT 7.3 2021    BASOPCT 0.6 2021    MONOSABS 0.7 2021    LYMPHSABS 1.3 2021    EOSABS 0.6 2021    BASOSABS 0.1 2021    DIFFTYPE AUTOMATED DIFFERENTIAL 2021     BMP:    Lab Results   Component Value Date     2021    K 4.3 2021    CL 95 2021    CO2 28 2021    BUN 38 2021    CREATININE 2.5 2021    CALCIUM 8.0 2021    GFRAA 29 2021    LABGLOM 24 12/21/2021    GLUCOSE 121 12/21/2021     Hepatic Function Panel:    Lab Results   Component Value Date    ALKPHOS 107 12/20/2021    ALT 6 12/20/2021    AST 9 12/20/2021    PROT 7.2 12/20/2021    PROT 6.9 10/10/2012    BILITOT 0.2 12/20/2021     ABG:    Lab Results   Component Value Date    IPW8BLL 28.3 12/21/2021    TGS4IJW 49.0 12/21/2021    PO2ART 59 12/21/2021       Cultures:   Pending      Radiology Review:   1. Bilateral pulmonary opacities and bilateral pleural effusions, which could   be due to edema or pneumonia. 2. Cardiomegaly. 3. Large hiatal hernia               Assessment/Plan       Patient Active Problem List    Diagnosis Date Noted    Acute respiratory failure with hypoxemia ([address] 12/20/2021    Hypoxia     Hyponatremia     Acute pulmonary edema (HCC)     GERD (gastroesophageal reflux disease) 07/30/2021    Type 2 diabetes mellitus with kidney complication, with long-term current use of insulin ([facility] 07/30/2021    Stage 4 chronic kidney disease ([facility] 05/13/2021    Essential hypertension 05/12/2021    Other hyperlipidemia 05/12/2021    Other dysphagia 05/12/2021    Bilateral carotid artery stenosis 08/16/2016   Acute on chronic Hypoxic hypercapneic resp failure  Chronic Metabolic alkalosis  Moderate MD  Pulmonary congestion  H/o PE on coumadin subtherapeutic  RLE DVT now  Mild Increase in Troponins  Post herpetic neuralgia  Suspected Pneumonia  Right Pleuritic chest pain likely sec to PE    PLAN  1. IV Heparin with protocol  2. Azithromycin  3. Inhalers  4. ICS  5. CXR in am  6. Keep sats > 92%  9. GI prophylaxis  10.  C/w present management              Electronically signed by Eren Guerra MD on 12/21/2021 at 2:19 PM

## 2021-12-21 NOTE — PROGRESS NOTES
12/21/21 1139   Oxygen Therapy/Pulse Ox   O2 Therapy Oxygen   O2 Device Nasal cannula   O2 Flow Rate (L/min) 3 L/min   SpO2 93 %   turned pt down from 6L NC to 3L NC - sats 93% on 3L

## 2021-12-21 NOTE — PROGRESS NOTES
PHARMACY ANTICOAGULATION MONITORING SERVICE    Deandre Montanez is a 80 y.o. male on warfarin therapy for Hx of PE, DVT. Pharmacy consulted by Dr. Andrea Fernando for monitoring and adjustment of treatment. Indication for anticoagulation: Hx of PE, DVT  INR goal: 2-3  Warfarin dose prior to admission: 5mg daily    Pertinent Laboratory Values   Recent Labs     12/20/21  0025 12/20/21  0025 12/20/21  0700 12/20/21  0700 12/21/21  0146   INR 1.38   < >  --   --  1.39   HGB 10.6*   < > 9.9*   < > 9.3*   HCT 35.4*   < > 33.6*   < > 31.4*     --  311  --  279    < > = values in this interval not displayed. Assessment/Plan:   Drug Interactions:   o Home meds:  cilostazol  o Heparin drip    Warfarin 5mg daily   [address] [place] [address] will continue to monitor and adjust warfarin therapy as indicated    Thank you for the consult.   Fransisco Davila, [address] [facility]  12/21/2021 1:58 PM

## 2021-12-22 NOTE — PROGRESS NOTES
PHARMACY ANTICOAGULATION MONITORING SERVICE    Burt Cortez is a 80 y.o. male on warfarin therapy for Hx of PE, DVT. Pharmacy consulted by Dr. John Paul Bautista for monitoring and adjustment of treatment. Indication for anticoagulation: Hx of PE, DVT  INR goal: 2-3  Warfarin dose prior to admission: 5mg daily    Pertinent Laboratory Values   Recent Labs     12/20/21  0700 12/20/21  0700 12/21/21  0146 12/21/21  0146 12/22/21  0705   INR  --   --  1.39   < > 1.52   HGB 9.9*   < > 9.3*   < > 9.5*   HCT 33.6*   < > 31.4*   < > 32.5*     --  279  --  220    < > = values in this interval not displayed. INR MONITORING  Recent Labs     12/20/21  0025 12/21/21  0146 12/22/21  0705   INR 1.38 1.39 1.52     Assessment/Plan:   Drug Interactions:   o Home meds:  cilostazol  o Heparin drip bridge to warfarin continues.  INR starting to move back upward after 2 doses of 5mg.  Will give a slight booster dose of warfarin 6mg tonight and look to resume home dose of 5mg daily pending INR trends tomorrow.  HGB leveling off @9.5 today. [address] [place] [address] will continue to monitor and adjust warfarin therapy as indicated    Thank you for the consult. Frankie Vaca, [address] [facility]  12/22/2021 11:38 AM

## 2021-12-22 NOTE — PROGRESS NOTES
Progress Note( Dr. Darlene Nicole)  12/22/2021  Subjective:   Admit Date: 12/19/2021  PCP: Rohith Rankin MD    Admitted For :Acute respiratory failure hypoxia    Consulted For: Better control of blood glucose    Interval History: Feels somewhat better    Denies any chest pains,   Still SOB . Hypoxia  Denies nausea or vomiting. No new bowel or bladder symptoms. Intake/Output Summary (Last 24 hours) at 12/22/2021 0710  Last data filed at 12/22/2021 0224  Gross per 24 hour   Intake 100 ml   Output 1075 ml   Net -975 ml       DATA    CBC:   Recent Labs     12/20/21  0025 12/20/21  0700 12/21/21  0146   WBC 9.7 9.6 7.3   HGB 10.6* 9.9* 9.3*    311 279    CMP:  Recent Labs     12/20/21  0025 12/21/21  0146   * 133*   K 4.6 4.3   CL 95* 95*   CO2 28 28   BUN 33* 38*   CREATININE 2.3* 2.5*   CALCIUM 8.3 8.0*   PROT 7.2  --    LABALBU 3.1*  --    BILITOT 0.2  --    ALKPHOS 107  --    AST 9*  --    ALT 6*  --      Lipids:   Lab Results   Component Value Date    CHOL 183 05/12/2021    HDL 32 05/12/2021    TRIG 163 05/12/2021     Glucose:  Recent Labs     12/21/21  2013 12/21/21  2107 12/22/21  0224   POCGLU 110* 105* 80     [id number]:  Lab Results   Component Value Date    LABA1C 9.0 12/21/2021     High Sensitivity TSH: No results found for: TSHHS  Free T3: No results found for: FT3  Free T4:No results found for: T4FREE    CT CHEST WO CONTRAST   Final Result   Moderate size left pleural effusion and trace right pleural effusion with   bibasilar atelectasis/pneumonitis. 13 mm spiculated nodular density in the anterior aspect left upper lobe. Although focal area of pneumonitis is possible, malignancy also possible. Of   the options below, would recommend short-term follow-up chest CT within 3   months to re-evaluate. Atherosclerosis. Large hiatal hernia.       RECOMMENDATIONS:   Fleischner Society guidelines for follow-up and management of incidentally   detected pulmonary nodules: Single Solid Nodule: In a low-risk patient, consider CT at 3 months, PET/CT, or tissue sampling. In a high-risk patient, consider CT at 3 months, PET/CT, or tissue sampling.      - Low risk patients include individuals with minimal or absent history of   smoking and other known risk factors. - High risk patients include individuals with a history or smoking or known   risk factors. Radiology 2017 http://pubs. rsna.org/doi/full/10.1148/radiol. 9999433191         VL DUP CAROTID BILATERAL   Final Result   The right internal carotid artery demonstrates near occlusion of the distal   right ICA. The left internal carotid artery demonstrates 50-69% stenosis. Bilateral vertebral arteries are patent with flow in the normal direction. NM LUNG VENT/PERFUSION (VQ)   Final Result   Low probability for pulmonary embolism. VL DUP LOWER EXTREMITY VENOUS BILATERAL   Final Result   1. DVT demonstrated in the right lower extremity involving the femoral,   popliteal and posterior tibial veins. 2. No evidence of DVT in the left lower extremity. Findings discussed with Dr Delicia Starks on 12/20/2021 at 10:15 a.m.         XR CHEST PORTABLE   Final Result   1. Bilateral pulmonary opacities and bilateral pleural effusions, which could   be due to edema or pneumonia. 2. Cardiomegaly. 3. Large hiatal hernia.          XR CHEST PORTABLE    (Results Pending)        Scheduled Medicines   Medications:    glipiZIDE  5 mg Oral QAM AC    cilostazol  50 mg Oral BID    furosemide  60 mg Oral Daily    pantoprazole  40 mg Oral Daily    [Held by provider] insulin glargine  25 Units SubCUTAneous Nightly    sodium chloride flush  5-40 mL IntraVENous 2 times per day    insulin lispro  0-6 Units SubCUTAneous TID WC    warfarin  5 mg Oral Daily    insulin lispro  0-3 Units SubCUTAneous 2 times per day    [Held by provider] insulin lispro  5 Units SubCUTAneous TID WC      Infusions:    dextrose      sodium chloride      heparin (PORCINE) Infusion 12 Units/kg/hr (12/22/21 0105)         Objective:   Vitals: BP (!) 171/83   Pulse 76   Temp 98.5 °F (36.9 °C) (Oral)   Resp 16   Ht 5' 8\" (1.727 m)   Wt 168 lb 14.4 oz (76.6 kg)   SpO2 92%   BMI 25.68 kg/m²   General appearance: alert and cooperative with exam  Neck: no JVD or bruit  Thyroid : Normal lobes   Lungs: Has Vesicular Breath sounds   Heart:  regular rate and rhythm  Abdomen: soft, non-tender; bowel sounds normal; no masses,  no organomegaly  Musculoskeletal: Normal  Extremities: extremities normal, , no edema  Neurologic:  Awake, alert, oriented to name, place and time. Cranial nerves II-XII are grossly intact. Motor is  intact. Sensory is intact. ,  and gait is normal.    Assessment:     Patient Active Problem List:     Bilateral carotid artery stenosis     Essential hypertension     Other hyperlipidemia     Other dysphagia     Stage 4 chronic kidney disease (HCC)     GERD (gastroesophageal reflux disease)     Type 2 diabetes mellitus with kidney complication, with long-term current use of insulin ([address]     Acute respiratory failure with hypoxemia (HCC)     Hypoxia     Hyponatremia     Acute pulmonary edema ([address]      Plan:     1. Reviewed POC blood glucose . Labs and X ray results   2. Reviewed Current Medicines   3. On meal/ Correction bolus Humalog/ Basal Lantus Insulin regime /  4. Monitor Blood glucose frequently   5. Modified  the dose of Insulin/ other medicines as needed   6. Will follow     .      Joel Cortez MD, MD

## 2021-12-22 NOTE — PROGRESS NOTES
Pulmonary and Critical Care  Progress Note      VITALS:  BP (!) 103/52   Pulse 79   Temp 98.5 °F (36.9 °C) (Oral)   Resp 14   Ht 5' 8\" (1.727 m)   Wt 168 lb 14.4 oz (76.6 kg)   SpO2 92%   BMI 25.68 kg/m²     Subjective:   CHIEF COMPLAINT :Right Pleuritic chest pain     HPI:                The patient is lying in the bed. He is not in acute resp distress    Objective:   PHYSICAL EXAM:    LUNGS:Decreased air entry left base  Abd-soft, BS+,NT  Ext- no pedal edema  CVS-s1s2, no murmurs      DATA:    CBC:  Recent Labs     12/20/21  0025 12/20/21  0025 12/20/21  0700 12/21/21  0146 12/22/21  0705   WBC 9.7   < > 9.6 7.3 6.5   RBC 3.88*   < > 3.62* 3.36* 3.45*   HGB 10.6*   < > 9.9* 9.3* 9.5*   HCT 35.4*   < > 33.6* 31.4* 32.5*      < > 311 279 220   MCV 91.2   < > 92.8 93.5 94.2   MCH 27.3   < > 27.3 27.7 27.5   MCHC 29.9*   < > 29.5* 29.6* 29.2*   RDW 14.1   < > 14.2 13.9 14.1   SEGSPCT 72.1*  --   --   --   --     < > = values in this interval not displayed. BMP:  Recent Labs     12/20/21  0025 12/21/21  0146 12/22/21  0705   * 133* 138   K 4.6 4.3 4.7   CL 95* 95* 100   CO2 28 28 27   BUN 33* 38* 35*   CREATININE 2.3* 2.5* 2.3*   CALCIUM 8.3 8.0* 8.4   GLUCOSE 151* 121* 69*      ABG:  Recent Labs     12/21/21  1145   PH 7.37   PO2ART 59*   [id number] 49.0*   O2SAT 89.7*     BNP  Lab Results   Component Value Date    BNP 83 10/10/2012      D-Dimer:  Lab Results   Component Value Date    DDIMER 1831 (H) 12/20/2021      1. Radiology:   Atelectasis or infiltrate in the left lung base with a left pleural effusion. Follow up to resolution is suggested     Moderate size left pleural effusion and trace right pleural effusion with   bibasilar atelectasis/pneumonitis.     13 mm spiculated nodular density in the anterior aspect left upper lobe.    Although focal area of pneumonitis is possible, malignancy also possible.  Of   the options below, would recommend short-term follow-up chest CT within 3 months to re-evaluate. Assessment/Plan     Patient Active Problem List    Diagnosis Date Noted    Acute respiratory failure with hypoxemia ([facility] 12/20/2021    Hypoxia     Hyponatremia     Acute pulmonary edema (HCC)     GERD (gastroesophageal reflux disease) 07/30/2021    Type 2 diabetes mellitus with kidney complication, with long-term current use of insulin ([facility] 07/30/2021    Stage 4 chronic kidney disease ([facility] 05/13/2021    Essential hypertension 05/12/2021    Other hyperlipidemia 05/12/2021    Other dysphagia 05/12/2021    Bilateral carotid artery stenosis 08/16/2016   Acute on chronic Hypoxic hypercapneic resp failure  Chronic Metabolic alkalosis  Moderate NV  Pulmonary congestion  H/o PE on coumadin subtherapeutic  RLE DVT now  Mild Increase in Troponins  Post herpetic neuralgia  Suspected Pneumonia  Right Pleuritic chest pain likely sec to PE  Left pleural effusion with consolidation ? Pneumonia  ROSALIE spiculated nodule       1. Abx  2. F/u C&S  3. AC  4. Need left thoracentesis off AC to look or parapneumonic effusion vs malignant  5. ICS  6. OOB  7. Keep sats > 92%  8.  C/w present management  9. OP CT chest in 3 months for the ROSALIE spiculated nodule    Electronically signed by Mariusz Snider MD on 12/22/2021 at 12:25 PM

## 2021-12-22 NOTE — PROGRESS NOTES
Nephrology Progress Note        [address] [address]  Phone: [phone]  Office Hours: 8:30AM - 4:30PM  Monday - Friday 12/22/2021 6:41 AM  Subjective:   Admit Date: 12/19/2021  PCP: Jesus Acuna MD  Interval History: on nc  No leg edema    Diet: ADULT DIET; Regular; 4 carb choices (60 gm/meal); Low Phosphorus (Less than 1000 mg); 60 to 80 gm      Data:   Scheduled Meds:   cilostazol  50 mg Oral BID    furosemide  60 mg Oral Daily    pantoprazole  40 mg Oral Daily    [Held by provider] insulin glargine  25 Units SubCUTAneous Nightly    sodium chloride flush  5-40 mL IntraVENous 2 times per day    insulin lispro  0-6 Units SubCUTAneous TID WC    warfarin  5 mg Oral Daily    insulin lispro  0-3 Units SubCUTAneous 2 times per day    insulin lispro  5 Units SubCUTAneous TID WC     Continuous Infusions:   dextrose      sodium chloride      heparin (PORCINE) Infusion 12 Units/kg/hr (12/22/21 0105)     PRN Meds:oxyCODONE-acetaminophen, glucose, dextrose, glucagon (rDNA), dextrose, sodium chloride flush, sodium chloride, ondansetron **OR** ondansetron, polyethylene glycol, acetaminophen **OR** acetaminophen, heparin (porcine), heparin (porcine)  I/O last 3 completed shifts:   In: 100 [P.O.:100]  Out: 1275 [Urine:1275]  I/O this shift:  In: -   Out: 200 [Urine:200]    Intake/Output Summary (Last 24 hours) at 12/22/2021 0641  Last data filed at 12/22/2021 0224  Gross per 24 hour   Intake 100 ml   Output 1075 ml   Net -975 ml       CBC:   Recent Labs     12/20/21  0025 12/20/21  0700 12/21/21  0146   WBC 9.7 9.6 7.3   HGB 10.6* 9.9* 9.3*    311 279       BMP:    Recent Labs     12/20/21  0025 12/21/21  0146   * 133*   K 4.6 4.3   CL 95* 95*   CO2 28 28   BUN 33* 38*   CREATININE 2.3* 2.5*   GLUCOSE 151* 121*     Hepatic:   Recent Labs     12/20/21  0025   AST 9*   ALT 6*   BILITOT 0.2   ALKPHOS 107         Objective:   Vitals: BP (!) 171/83 Pulse 76   Temp 98.5 °F (36.9 °C) (Oral)   Resp 16   Ht 5' 8\" (1.727 m)   Wt 168 lb 14.4 oz (76.6 kg)   SpO2 92%   BMI 25.68 kg/m²   General appearance: alert and cooperative with exam, in no acute distress  HEENT: normocephalic, atraumatic,   Neck: supple, trachea midline  Lungs:  breathing comfortably on nc  Heart[de-identified] regular rate and rhythm,  Abdomen: soft, non-tender; non distended,   Extremities: extremities atraumatic, no cyanosis or edema  Neurologic: alert, oriented, follows commands, interactive    Assessment and Plan:      YASMEEN  CKD4  Hyponatremia   Acute hypoxic resp failure  Suspected PE  Mild probnp elevation    Plan:  Cr 2.5  Continue supportive mgmt  Avoid nephrotoxins  Continue the home dose of lasix for now                     Electronically signed by Marylee Dolly, DO on 12/22/2021 at 6:41 AM    [facility]  Reba Minor, MD Hampton Osgood, DO [address]  [place], [place] [address]  PHONE: [phone]  FAX: [phone]

## 2021-12-22 NOTE — PROGRESS NOTES
Progress Note( Dr. Martine Choudhary)  12/21/2021  Subjective:   Admit Date: 12/19/2021  PCP: Scotty Nassar MD    Admitted For :Acute respiratory failure hypoxia    Consulted For: Better control of blood glucose    Interval History: Feels somewhat better    Denies any chest pains,   Still SOB . Hypoxia  Denies nausea or vomiting. No new bowel or bladder symptoms. Intake/Output Summary (Last 24 hours) at 12/21/2021 2313  Last data filed at 12/21/2021 1301  Gross per 24 hour   Intake --   Output 950 ml   Net -950 ml       DATA    CBC:   Recent Labs     12/20/21  0025 12/20/21  0700 12/21/21  0146   WBC 9.7 9.6 7.3   HGB 10.6* 9.9* 9.3*    311 279    CMP:  Recent Labs     12/20/21  0025 12/21/21  0146   * 133*   K 4.6 4.3   CL 95* 95*   CO2 28 28   BUN 33* 38*   CREATININE 2.3* 2.5*   CALCIUM 8.3 8.0*   PROT 7.2  --    LABALBU 3.1*  --    BILITOT 0.2  --    ALKPHOS 107  --    AST 9*  --    ALT 6*  --      Lipids:   Lab Results   Component Value Date    CHOL 183 05/12/2021    HDL 32 05/12/2021    TRIG 163 05/12/2021     Glucose:  Recent Labs     12/21/21  1636 12/21/21  2013 12/21/21  2107   POCGLU 166* 110* 105*     [id number]:  Lab Results   Component Value Date    LABA1C 9.0 12/21/2021     High Sensitivity TSH: No results found for: TSHHS  Free T3: No results found for: FT3  Free T4:No results found for: T4FREE    CT CHEST WO CONTRAST   Final Result   Moderate size left pleural effusion and trace right pleural effusion with   bibasilar atelectasis/pneumonitis. 13 mm spiculated nodular density in the anterior aspect left upper lobe. Although focal area of pneumonitis is possible, malignancy also possible. Of   the options below, would recommend short-term follow-up chest CT within 3   months to re-evaluate. Atherosclerosis. Large hiatal hernia.       RECOMMENDATIONS:   Fleischner Society guidelines for follow-up and management of incidentally   detected pulmonary nodules: Single Solid Nodule: In a low-risk patient, consider CT at 3 months, PET/CT, or tissue sampling. In a high-risk patient, consider CT at 3 months, PET/CT, or tissue sampling.      - Low risk patients include individuals with minimal or absent history of   smoking and other known risk factors. - High risk patients include individuals with a history or smoking or known   risk factors. Radiology 2017 http://pubs. rsna.org/doi/full/10.1148/radiol. 8584451841         VL DUP CAROTID BILATERAL   Final Result   The right internal carotid artery demonstrates near occlusion of the distal   right ICA. The left internal carotid artery demonstrates 50-69% stenosis. Bilateral vertebral arteries are patent with flow in the normal direction. NM LUNG VENT/PERFUSION (VQ)   Final Result   Low probability for pulmonary embolism. VL DUP LOWER EXTREMITY VENOUS BILATERAL   Final Result   1. DVT demonstrated in the right lower extremity involving the femoral,   popliteal and posterior tibial veins. 2. No evidence of DVT in the left lower extremity. Findings discussed with Dr Luisa Angel on 12/20/2021 at 10:15 a.m.         XR CHEST PORTABLE   Final Result   1. Bilateral pulmonary opacities and bilateral pleural effusions, which could   be due to edema or pneumonia. 2. Cardiomegaly. 3. Large hiatal hernia.          XR CHEST PORTABLE    (Results Pending)        Scheduled Medicines   Medications:    cilostazol  50 mg Oral BID    furosemide  60 mg Oral Daily    pantoprazole  40 mg Oral Daily    [Held by provider] insulin glargine  25 Units SubCUTAneous Nightly    sodium chloride flush  5-40 mL IntraVENous 2 times per day    insulin lispro  0-6 Units SubCUTAneous TID WC    warfarin  5 mg Oral Daily    insulin lispro  0-3 Units SubCUTAneous 2 times per day    insulin lispro  5 Units SubCUTAneous TID WC      Infusions:    dextrose      sodium chloride      heparin (PORCINE) Infusion 14 Units/kg/hr (12/21/21 1832)         Objective:   Vitals: /74   Pulse 68   Temp 98.1 °F (36.7 °C) (Oral)   Resp 16   Ht 5' 8\" (1.727 m)   Wt 180 lb (81.6 kg)   SpO2 95%   BMI 27.37 kg/m²   General appearance: alert and cooperative with exam  Neck: no JVD or bruit  Thyroid : Normal lobes   Lungs: Has Vesicular Breath sounds   Heart:  regular rate and rhythm  Abdomen: soft, non-tender; bowel sounds normal; no masses,  no organomegaly  Musculoskeletal: Normal  Extremities: extremities normal, , no edema  Neurologic:  Awake, alert, oriented to name, place and time. Cranial nerves II-XII are grossly intact. Motor is  intact. Sensory is intact. ,  and gait is normal.    Assessment:     Patient Active Problem List:     Bilateral carotid artery stenosis     Essential hypertension     Other hyperlipidemia     Other dysphagia     Stage 4 chronic kidney disease (HCC)     GERD (gastroesophageal reflux disease)     Type 2 diabetes mellitus with kidney complication, with long-term current use of insulin ([address]     Acute respiratory failure with hypoxemia (HCC)     Hypoxia     Hyponatremia     Acute pulmonary edema ([address]      Plan:     1. Reviewed POC blood glucose . Labs and X ray results   2. Reviewed Current Medicines   3. On meal/ Correction bolus Humalog/ Basal Lantus Insulin regime /  4. Monitor Blood glucose frequently   5. Modified  the dose of Insulin/ other medicines as needed   6. Will follow     .      Marissa Maynard MD, MD

## 2021-12-22 NOTE — PROGRESS NOTES
Hospitalist Progress Note  Cade Lawrence MD      Name:  Gavin Lund /Age/Sex: 1929  (80 y.o. male)   MRN & CSN:  [id number] & [id number] Admission Date/Time: 2021 11:49 PM   Location:  8779/7759-[id number] PCP: Angeles Mayorga, [address] Day: 4    Assessment and Plan:   Gavin Lund is a 80 y.o.  male present with shortness of breath and admitted for hypoxic respiratory failure. Acute hypoxemic respiratory failure: Chest x-ray done on admission shows bilateral pulmonary opacities and bilateral pleural effusions which could be due to edema or pneumonia. His respite disease panel was negative so negative for SARS-CoV-2. He does not have elevated white count. I think part of it was fluid overload he feels much better now he does not have any chest pain. Also pulmonology consult was put in, will check procalcitonin. Low suspicion for pneumonia no cough probably had acute pulmonary edema. I will check blood gas. CT scan of the chest without contrast showed Moderate size left pleural effusion and trace right pleural effusion with bibasilar atelectasis/pneumonitis. 13 mm spiculated nodular density in the anterior aspect left upper lobe  Right lower extremity DVT. Doppler ultrasound shows DVT demonstrated in the right lower extremity involving the femoral popliteal and posterior tibial veins no DVT in the left lower extremity on 2021 he is on IV heparin which will continue. He has been started on warfarin pharmacy to dose. CAD /syncope  CKD 4  DM 2  Pprominent BNP was 1127 some elevation of troponins but not positive. Dr. Nance Bennington  saw him for cardiology he wants to check echo and continue with anticoagulation for now. He has normal ejection fraction on echo.       Diet ADULT DIET; Regular; 4 carb choices (60 gm/meal);  Low Phosphorus (Less than 1000 mg); 60 to 80 gm   DVT Prophylaxis Heparin gtt/coumadin   Code Status Full Code   Disposition Home vs ECF in 2-3 days Glucose    Collection Time: 12/21/21  8:13 PM   Result Value Ref Range    POC Glucose 110 (H) 70 - 99 MG/DL   POCT Glucose    Collection Time: 12/21/21  9:07 PM   Result Value Ref Range    POC Glucose 105 (H) 70 - 99 MG/DL   APTT    Collection Time: 12/21/21 10:57 PM   Result Value Ref Range    aPTT 124.0 (H) 25.1 - 37.1 SECONDS   POCT Glucose    Collection Time: 12/22/21  2:24 AM   Result Value Ref Range    POC Glucose 80 70 - 99 MG/DL   CBC    Collection Time: 12/22/21  7:05 AM   Result Value Ref Range    WBC 6.5 4.0 - 10.5 K/CU MM    RBC 3.45 (L) 4.6 - 6.2 M/CU MM    Hemoglobin 9.5 (L) 13.5 - 18.0 GM/DL    Hematocrit 32.5 (L) 42 - 52 %    MCV 94.2 78 - 100 FL    MCH 27.5 27 - 31 PG    MCHC 29.2 (L) 32.0 - 36.0 %    RDW 14.1 11.7 - 14.9 %    Platelets 621 659 - 580 K/CU MM    MPV 10.3 7.5 - 11.1 FL   APTT    Collection Time: 12/22/21  7:05 AM   Result Value Ref Range    aPTT 73.9 (H) 25.1 - 37.1 SECONDS   C-reactive protein    Collection Time: 12/22/21  7:05 AM   Result Value Ref Range    CRP, High Sensitivity 108.7 mg/L   Procalcitonin    Collection Time: 12/22/21  7:05 AM   Result Value Ref Range    Procalcitonin 9.005    Basic metabolic panel    Collection Time: 12/22/21  7:05 AM   Result Value Ref Range    Sodium 138 135 - 145 MMOL/L    Potassium 4.7 3.5 - 5.1 MMOL/L    Chloride 100 99 - 110 mMol/L    CO2 27 21 - 32 MMOL/L    Anion Gap 11 4 - 16    BUN 35 (H) 6 - 23 MG/DL    CREATININE 2.3 (H) 0.9 - 1.3 MG/DL    Glucose 69 (L) 70 - 99 MG/DL    Calcium 8.4 8.3 - 10.6 MG/DL    GFR Non- 27 (L) >60 mL/min/1.73m2    GFR  32 (L) >60 mL/min/1.73m2   Protime-INR    Collection Time: 12/22/21  7:05 AM   Result Value Ref Range    Protime 19.7 (H) 11.7 - 14.5 SECONDS    INR 1.52 INDEX   POCT Glucose    Collection Time: 12/22/21  8:29 AM   Result Value Ref Range    POC Glucose 73 70 - 99 MG/DL       Medications:   Medications:    glipiZIDE  5 mg Oral QAM AC    cilostazol  50 mg Oral BID  furosemide  60 mg Oral Daily    pantoprazole  40 mg Oral Daily    [Held by provider] insulin glargine  25 Units SubCUTAneous Nightly    sodium chloride flush  5-40 mL IntraVENous 2 times per day    insulin lispro  0-6 Units SubCUTAneous TID WC    warfarin  5 mg Oral Daily    insulin lispro  0-3 Units SubCUTAneous 2 times per day    [Held by provider] insulin lispro  5 Units SubCUTAneous TID WC      Infusions:    dextrose      sodium chloride      heparin (PORCINE) Infusion 12 Units/kg/hr (12/22/21 0105)     PRN Meds: oxyCODONE-acetaminophen, 1 tablet, BID PRN  glucose, 15 g, PRN  dextrose, 12.5 g, PRN  glucagon (rDNA), 1 mg, PRN  dextrose, 100 mL/hr, PRN  sodium chloride flush, 5-40 mL, PRN  sodium chloride, 25 mL, PRN  ondansetron, 4 mg, Q8H PRN   Or  ondansetron, 4 mg, Q6H PRN  polyethylene glycol, 17 g, Daily PRN  acetaminophen, 650 mg, Q6H PRN   Or  acetaminophen, 650 mg, Q6H PRN  heparin (porcine), 80 Units/kg, PRN  heparin (porcine), 40 Units/kg, PRN          Electronically signed by Mack Alcantara MD on 12/22/2021 at 9:26 AM

## 2021-12-22 NOTE — CARE COORDINATION
CM to bedside to introduce self and initiate discharge planning. Pt alert, oriented, and able to answer questions appropriately. Pt lives at home with son in a 2 story home. He resides on the main floor and does not need to go upstairs for anything. Pt does not have any [place] [id number] services. Pt uses a walker at home. Pt plans to return home with son. Denies any needs at this time.

## 2021-12-23 NOTE — PROGRESS NOTES
Joel Boyd MD    Hospitalist Progress Note      Name:  Sho Parry /Age/Sex: 1929  (80 y.o. male)   MRN & CSN:  [id number] & [id number] Admission Date/Time: 2021 11:49 PM   Location:  9200/6547-W PCP: Tiffanie Shore MD       I saw and examined the patient on 2021 at [address] Day: 5    Assessment and Plan:       80 y.o.  male present with shortness of breath and admitted for hypoxic respiratory failure. Acute hypoxemic respiratory failure: Chest x-ray done on admission shows bilateral pulmonary opacities and bilateral pleural effusions which could be due to edema or pneumonia. His respite disease panel was negative so negative for SARS-CoV-2. He does not have elevated white count. I think part of it was fluid overload he feels much better now he does not have any chest pain. Also pulmonology consult was put in, will check procalcitonin. Low suspicion for pneumonia no cough probably had acute pulmonary edema. I will check blood gas. CT scan of the chest without contrast showed Moderate size left pleural effusion and trace right pleural effusion with bibasilar atelectasis/pneumonitis. 13 mm spiculated nodular density in the anterior aspect left upper lobe. Plan for left-sided thoracentesis by IR. Restart anticoagulation after thoracentesis  Right lower extremity DVT. Doppler ultrasound shows DVT demonstrated in the right lower extremity involving the femoral popliteal and posterior tibial veins no DVT in the left lower extremity on 2021 he is on IV heparin which will continue. He has been started on warfarin pharmacy to dose. Restart heparin gtt. after procedure if INR is not therapeutic  CAD /syncope  CKD 4  DM 2  Pprominent BNP was 1127 some elevation of troponins but not positive. Dr. Nia Her  saw him for cardiology he wants to check echo and continue with anticoagulation for now. He has normal ejection fraction on echo.   On high risk medication: heparin gtt     Heparin GTT/Coumadin for DVT prophylaxis. Heparin GTT on hold due to procedure  Full code  Barriers to discharge. Heparin GTT, thoracentesis, consultation     Objective: Intake/Output Summary (Last 24 hours) at 12/23/2021 1026  Last data filed at 12/23/2021 0215  Gross per 24 hour   Intake 120 ml   Output 700 ml   Net -580 ml      Vitals:   Vitals:    12/23/21 0924   BP: 103/87   Pulse: 78   Resp: 16   Temp: 98.3 °F (36.8 °C)   SpO2: 96%       Ten point ROS reviewed negative, unless as noted above    Physical Exam:     GENERAL APPEARANCE: not in any acute distress,  appears comfortable. NECK: Supple, no JVD.   CARDIOVASCULAR: Regular rate and rhythm, no murmurs, rubs or gallops  LUNGS: clear to auscultation bilaterally   ABDOMEN: normoactive bowel sounds, soft non-tender and non distended  EXTREMITIES: No edema  MUSCULOSKELETAL S: normal movement and normal bulk in all ext  NEUROLOGIC: Alert and awake- grossly non focal exam, moving all extremities   SKIN: No Rashes         Medications:   Medications:    glipiZIDE  5 mg Oral QAM AC    warfarin  5 mg Oral Daily    azithromycin  250 mg Oral Daily    cilostazol  50 mg Oral BID    furosemide  60 mg Oral Daily    pantoprazole  40 mg Oral Daily    sodium chloride flush  5-40 mL IntraVENous 2 times per day    insulin lispro  0-6 Units SubCUTAneous TID WC    insulin lispro  0-3 Units SubCUTAneous 2 times per day      Infusions:    dextrose      sodium chloride      heparin (PORCINE) Infusion 8 Units/kg/hr (12/23/21 0941)     PRN Meds: oxyCODONE-acetaminophen, 1 tablet, BID PRN  glucose, 15 g, PRN  dextrose, 12.5 g, PRN  glucagon (rDNA), 1 mg, PRN  dextrose, 100 mL/hr, PRN  sodium chloride flush, 5-40 mL, PRN  sodium chloride, 25 mL, PRN  ondansetron, 4 mg, Q8H PRN   Or  ondansetron, 4 mg, Q6H PRN  polyethylene glycol, 17 g, Daily PRN  acetaminophen, 650 mg, Q6H PRN   Or  acetaminophen, 650 mg, Q6H PRN  heparin (porcine), 80 Units/kg,

## 2021-12-23 NOTE — PROGRESS NOTES
PHARMACY ANTICOAGULATION MONITORING SERVICE    Alfred Hernandez is a 80 y.o. male on warfarin therapy for Hx of PE, DVT. Pharmacy consulted by Dr. Mariposa López for monitoring and adjustment of treatment. Indication for anticoagulation: Hx of PE, DVT  INR goal: 2-3  Warfarin dose prior to admission: 5mg daily    Pertinent Laboratory Values   Recent Labs     12/21/21  0146 12/21/21  0146 12/22/21  0705 12/22/21  0705 12/23/21  0404   INR 1.39   < > 1.52   < > 1.93   HGB 9.3*   < > 9.5*  --   --    HCT 31.4*   < > 32.5*  --   --      --  220  --   --     < > = values in this interval not displayed. INR MONITORING  Recent Labs     12/21/21  0146 12/22/21  0705 12/23/21  0404   INR 1.39 1.52 1.93     Assessment/Plan:   Drug Interactions:   o Home meds:  cilostazol  o Azithromycin began yesterday - 12/22 - watch for increased INR trends  o Heparin drip bridge to warfarin continues=  +THE PATIENT IS CURRENTLY TAKING BOTH COUMADIN AND Tx Heparin drip+  o LOOK TO DISCONTINUE THE Tx heparin- ONCE THE INR IS THERAPEUTIC   INR starting to move back upward after 2 doses of 5mg; 1 x booster dose of 6mg last pm, 12/22.  CONTINUE SAME DOSE AND FREQUENCY =  home dose of 5mg daily; pending INR trends tomorrow.  HGB leveling off @9.5 - on 12/22. [address] [place] [address] will continue to monitor and adjust warfarin therapy as indicated    Thank you for the consult.   Iris Santiago, [facility]  12/23/2021 11:49 AM

## 2021-12-23 NOTE — PROGRESS NOTES
Nephrology Progress Note        [address], [address] [address]  Phone: [phone]  Office Hours: 8:30AM - 4:30PM  Monday - Friday 12/23/2021 8:45 AM  Subjective:   Admit Date: 12/19/2021  PCP: Geno Montana MD  Interval History: on nc  Doing well  Reports no appettite    Diet: ADULT DIET; Regular; 4 carb choices (60 gm/meal); Low Phosphorus (Less than 1000 mg); 60 to 80 gm      Data:   Scheduled Meds:   glipiZIDE  5 mg Oral QAM AC    warfarin  5 mg Oral Daily    azithromycin  250 mg Oral Daily    cilostazol  50 mg Oral BID    furosemide  60 mg Oral Daily    pantoprazole  40 mg Oral Daily    sodium chloride flush  5-40 mL IntraVENous 2 times per day    insulin lispro  0-6 Units SubCUTAneous TID WC    insulin lispro  0-3 Units SubCUTAneous 2 times per day     Continuous Infusions:   dextrose      sodium chloride      heparin (PORCINE) Infusion Stopped (12/23/21 0723)     PRN Meds:oxyCODONE-acetaminophen, glucose, dextrose, glucagon (rDNA), dextrose, sodium chloride flush, sodium chloride, ondansetron **OR** ondansetron, polyethylene glycol, acetaminophen **OR** acetaminophen, heparin (porcine), heparin (porcine)  I/O last 3 completed shifts: In: 240 [P.O.:240]  Out: 950 [Urine:950]  No intake/output data recorded.     Intake/Output Summary (Last 24 hours) at 12/23/2021 0845  Last data filed at 12/23/2021 0215  Gross per 24 hour   Intake 240 ml   Output 950 ml   Net -710 ml       CBC:   Recent Labs     12/21/21  0146 12/22/21  0705   WBC 7.3 6.5   HGB 9.3* 9.5*    220       BMP:    Recent Labs     12/21/21  0146 12/22/21  0705 12/23/21  0404   * 138 133*   K 4.3 4.7 4.6   CL 95* 100 97*   CO2 28 27 27   BUN 38* 35* 35*   CREATININE 2.5* 2.3* 2.5*   GLUCOSE 121* 69* 112*     Objective:   Vitals: /68   Pulse 81   Temp 98.2 °F (36.8 °C) (Oral)   Resp 14   Ht 5' 8\" (1.727 m)   Wt 168 lb 14.4 oz (76.6 kg)   SpO2 97%   BMI 25.68 kg/m² General appearance: alert and cooperative with exam, in no acute distress  HEENT: normocephalic, atraumatic,   Neck: supple, trachea midline  Lungs: clear to auscultation bilaterally, breathing comfortably on nc  Heart[de-identified] regular rate and rhythm, S1, S2 normal,  Abdomen: soft, non-tender; non distended  Extremities: extremities atraumatic, no cyanosis or edema  Neurologic: alert, oriented, follows commands, interactive    Assessment and Plan:     Patient Active Problem List    Diagnosis Date Noted    Acute respiratory failure with hypoxemia ([facility] 12/20/2021    Hypoxia     Hyponatremia     Acute pulmonary edema (HCC)     GERD (gastroesophageal reflux disease) 07/30/2021    Type 2 diabetes mellitus with kidney complication, with long-term current use of insulin ([facility] [address] 07/30/2021    Stage 4 chronic kidney disease ([facility] [address] 05/13/2021    Essential hypertension 05/12/2021    Other hyperlipidemia 05/12/2021    Other dysphagia 05/12/2021    Bilateral carotid artery stenosis 08/16/2016     Cr stable at 2.5  Monitor sodium, it is 133 today  Ok to continue lasix 60mg po daily                    Electronically signed by Delicia Sanchez DO on 12/23/2021 at 8:45 AM    [address] Meneses DO [address],  [address]  [facility], [place] [address]  PHONE: [phone]  FAX: [phone]

## 2021-12-23 NOTE — PROGRESS NOTES
PROCEDURE PERFORMED:  Left Thoracentesis    PRIMARY INDICATION FOR PROCEDURE:  Pleural effusion    PT TRANSPORTED FROM: 4028     TO THE IR ROOM:    small    PT IN THE ROOM AT WHAT TIME:     1325                   INFORMED CONSENT:  Patient signed consent with Dr. Kathleene Homans. Consent placed in chart. MARELY SCORE PRE PROCEDURE:  8    NURSING ASSESSMENT:  Patient alert and oriented. Placed in high gr position for procedure. Patient placed on cardiac monitor. TIME OUT COMPLETE:  1340    BARRIER PRECAUTIONS & STERILE TECHNIQUE  Pt remained in bed for procedure. Chlorhexadine and draped in a sterile fashion. PAIN/LOCAL ANESTHESIA/SEDATION MANAGEMENT:  Lidocaine 1% without Epinephrine administered by Dr. Kathleene Homans. INTRAOPERATIVE  US 3 images    FLUID RETURN:  Clear yellow  750 ml's of clear yellow pleural fluid drained. Patient tolerated the procedure well. CXR post procedure. Karina Driver to clarify lab status no pleural studies ordered, left a message.     STERILE DRESSINGS:  Gauze and tegaderm    SPECIMENS:  yes    EBL:  Less then 1 ml    FOLLOW- UP X-RAY:  Yes    COMPLICATIONS:  None    STAFF PRESENT DURING PROCEDURE: Dr. Dung Pablo RN, Latrice MUÑOZ    MARELY SCORE POST PROCEDURE:  8    REPORT CALLED TO:  Ronel Aviles RN     PT LEFT ROOM AT WHAT TIME: 8523

## 2021-12-23 NOTE — PROGRESS NOTES
Increase in Troponins  Post herpetic neuralgia  Suspected Pneumonia  Right Pleuritic chest pain likely sec to PE  Left pleural effusion with consolidation ? Pneumonia  ROSALIE spiculated nodule       1. Abx  2. F/u C&S  3. Keep sats > 92%  4. ICS  5. OOB  6. Await left thoracentesis  7. Restart AC after thoracentesis  8. OP CT chest  9.  C.w present management    Electronically signed by Lashonda Angel MD on 12/23/2021 at 12:29 PM

## 2021-12-23 NOTE — PROGRESS NOTES
Speech Language Pathology  Facility/Department: [facility]   CLINICAL BEDSIDE SWALLOW EVALUATION    NAME: Lio Mills  : 1929  MRN: [id number]    ADMISSION DATE: 2021  ADMITTING DIAGNOSIS: has Bilateral carotid artery stenosis; Essential hypertension; Other hyperlipidemia; Other dysphagia; Stage 4 chronic kidney disease ([facility] [address] GERD (gastroesophageal reflux disease); Type 2 diabetes mellitus with kidney complication, with long-term current use of insulin ([facility] [address] Acute respiratory failure with hypoxemia ([facility] [address] Hypoxia; Hyponatremia; and Acute pulmonary edema (HCC) on their problem list.      Impressions: Lio Mills was seen for a bedside swallowing evaluation after being admitted to [facility] with SOB. Pt was alert and cooperative throughout assessment. Relevant medical hx includes GERD, CAD and hypertension. Pt reports difficulty chewing solid foods at baseline d/t reduced dentition. For today's assessment pt was positioned upright in bed and presented with a clear vocal quality and strong volitional cough. Oral mechanism examination was [place] with no focal orofacial weakness. PO trials of puree, regular solids and thin liquids by cup/straw sips were given. Moderate oral dysphagia was observed characterized by prolonged mastication, lingual mashing and slow oral A-P transit with trials of regular solids. Suspect mild pharyngeal dysphagia characterized by intermittently delayed swallow initiation with adequate laryngeal elevation. Clear vocal quality and 0 overt s/s of aspiration were observed with all PO trials given. Recommend minced and moist solids with strict aspiration precautions. SLP will continue to follow Lio Mills for diet tolerance monitoring.         ONSET DATE: this admission     Date of Eval: 2021  Evaluating Therapist: AALIYAH Miranda    Current Diet level:  Current Diet : Regular  Current Liquid Diet : Thin      Primary Complaint  Patient Complaint: weakness    Pain:  Pain Assessment  Pain Level: 0    Reason for Referral  Burt Cortez was referred for a bedside swallow evaluation to assess the efficiency of his swallow function, identify signs and symptoms of aspiration and make recommendations regarding safe dietary consistencies, effective compensatory strategies, and safe eating environment. Impression  Dysphagia Diagnosis: Mild pharyngeal stage dysphagia; Moderate oral stage dysphagia  Dysphagia Outcome Severity Scale: Level 4: Mild moderate dysphagia- Intermittent supervision/cueing. One - two diet consistencies restricted     Treatment Plan  Requires SLP Intervention: Yes  Duration/Frequency of Treatment: 2-3xs weekly/LOS or until goals are met  D/C Recommendations: To be determined       Recommended Diet and Intervention  Diet Solids Recommendation: Dysphagia Minced and Moist (Dysphagia II)  Liquid Consistency Recommendation: Thin  Recommended Form of Meds: Meds in puree     Therapeutic Interventions: Diet tolerance monitoring; Therapeutic PO trials with SLP    Compensatory Swallowing Strategies  Compensatory Swallowing Strategies: Eat/Feed slowly;Upright as possible for all oral intake    Treatment/Goals  Short-term Goals  Timeframe for Short-term Goals: LOS or until goals are met  Goal 1: Pt will tolerate minced and moist solids/thin liquids without clinical evidence of aspiration 100%  Goal 2: Pt/caregivers will demonstrate comprehension of recommendations/POC    General  Chart Reviewed: Yes  Behavior/Cognition: Alert; Cooperative;Pleasant mood  Respiratory Status: O2 via nasual cannula  O2 Device: Nasal cannula  Communication Observation: Functional  Follows Directions: Simple  Dentition: Edentulous  Patient Positioning: Upright in bed  Baseline Vocal Quality: Normal  Volitional Cough: Weak  Prior Dysphagia History: pt reports difficulty chewing solids  Consistencies Administered: Reg solid; Dysphagia Pureed (Dysphagia I); Thin - straw; Thin - cup;Thin - teaspoon           Vision/Hearing  Vision  Vision: Within Functional Limits  Hearing  Hearing: Within functional limits    Oral Motor Deficits  Oral/Motor  Oral Motor: Within functional limits    Oral Phase Dysfunction  Oral Phase  Oral Phase: Exceptions  Oral Phase Dysfunction  Impaired Mastication: Reg Solid  Decreased Anterior to Posterior Transit: Reg solid  Lingual/Palatal Residue: Reg solid     Indicators of Pharyngeal Phase Dysfunction   Pharyngeal Phase  Pharyngeal Phase: Exceptions  Indicators of Pharyngeal Phase Dysfunction  Delayed Swallow: All    Prognosis  Prognosis  Prognosis for safe diet advancement: good  Barriers to reach goals: severity of dysphagia  Individuals consulted  Consulted and agree with results and recommendations: Patient;RN    Education  Patient Education: recommendations/POC  Patient Education Response: Verbalizes understanding  Safety Devices in place: Yes  Type of devices:  All fall risk precautions in place       Therapy Time  SLP Individual Minutes  Time In: 1400  Time Out: [address]  Minutes: [address] [facility], [facility] [address], [facility], 12/23/2021

## 2021-12-24 NOTE — PLAN OF CARE
Nutrition Problem #1: Predicted inadequate energy intake  Intervention: Food and/or Nutrient Delivery: Continue Current Diet  Nutritional Goals: Pt will consume more than half of all meals

## 2021-12-24 NOTE — PROGRESS NOTES
Progress Note( Dr. Cal Oconnor)  12/23/2021  Subjective:   Admit Date: 12/19/2021  PCP: Betty Alfonso MD    Admitted For :Acute respiratory failure hypoxia    Consulted For: Better control of blood glucose    Interval History: Feels somewhat better    Denies any chest pains,   Still SOB . Hypoxia  Denies nausea or vomiting. No new bowel or bladder symptoms. Intake/Output Summary (Last 24 hours) at 12/23/2021 2329  Last data filed at 12/23/2021 2159  Gross per 24 hour   Intake --   Output 2210 ml   Net -2210 ml       DATA    CBC:   Recent Labs     12/21/21  0146 12/22/21  0705   WBC 7.3 6.5   HGB 9.3* 9.5*    220    CMP:  Recent Labs     12/21/21  0146 12/22/21  0705 12/23/21  0404   * 138 133*   K 4.3 4.7 4.6   CL 95* 100 97*   CO2 28 27 27   BUN 38* 35* 35*   CREATININE 2.5* 2.3* 2.5*   CALCIUM 8.0* 8.4 8.4     Lipids:   Lab Results   Component Value Date    CHOL 183 05/12/2021    HDL 32 05/12/2021    TRIG 163 05/12/2021     Glucose:  Recent Labs     12/23/21  1302 12/23/21  1749 12/23/21 2021   POCGLU 144* 126* 140*     [id number]:  Lab Results   Component Value Date    LABA1C 9.0 12/21/2021     High Sensitivity TSH: No results found for: TSHHS  Free T3: No results found for: FT3  Free T4:No results found for: T4FREE    XR CHEST PORTABLE   Final Result   Decrease in the left pleural effusion. No pneumothorax. IR GUIDED THORACENTESIS PLEURAL   Final Result   Successful ultrasound guided left thoracentesis. 750 mL clear serous   appearing left pleural fluid removed. Specimen sent for laboratory   evaluation. No complication suggested. XR CHEST PORTABLE   Final Result   Atelectasis or infiltrate in the left lung base with a left pleural effusion. Follow up to resolution is suggested. CT CHEST WO CONTRAST   Final Result   Moderate size left pleural effusion and trace right pleural effusion with   bibasilar atelectasis/pneumonitis.       13 mm spiculated nodular density in the anterior aspect left upper lobe. Although focal area of pneumonitis is possible, malignancy also possible. Of   the options below, would recommend short-term follow-up chest CT within 3   months to re-evaluate. Atherosclerosis. Large hiatal hernia. RECOMMENDATIONS:   Fleischner Society guidelines for follow-up and management of incidentally   detected pulmonary nodules:      Single Solid Nodule: In a low-risk patient, consider CT at 3 months, PET/CT, or tissue sampling. In a high-risk patient, consider CT at 3 months, PET/CT, or tissue sampling.      - Low risk patients include individuals with minimal or absent history of   smoking and other known risk factors. - High risk patients include individuals with a history or smoking or known   risk factors. Radiology 2017 http://pubs. rsna.org/doi/full/10.1148/radiol. 9351422396         VL DUP CAROTID BILATERAL   Final Result   The right internal carotid artery demonstrates near occlusion of the distal   right ICA. The left internal carotid artery demonstrates 50-69% stenosis. Bilateral vertebral arteries are patent with flow in the normal direction. NM LUNG VENT/PERFUSION (VQ)   Final Result   Low probability for pulmonary embolism. VL DUP LOWER EXTREMITY VENOUS BILATERAL   Final Result   1. DVT demonstrated in the right lower extremity involving the femoral,   popliteal and posterior tibial veins. 2. No evidence of DVT in the left lower extremity. Findings discussed with Dr Marina Roche on 12/20/2021 at 10:15 a.m.         XR CHEST PORTABLE   Final Result   1. Bilateral pulmonary opacities and bilateral pleural effusions, which could   be due to edema or pneumonia. 2. Cardiomegaly. 3. Large hiatal hernia.               Scheduled Medicines   Medications:    glipiZIDE  5 mg Oral QAM AC    warfarin  5 mg Oral Daily    azithromycin  250 mg Oral Daily    cilostazol  50 mg Oral BID    furosemide  60 mg Oral Daily    pantoprazole  40 mg Oral Daily    sodium chloride flush  5-40 mL IntraVENous 2 times per day    insulin lispro  0-6 Units SubCUTAneous TID     insulin lispro  0-3 Units SubCUTAneous 2 times per day      Infusions:    dextrose      sodium chloride      heparin (PORCINE) Infusion 12 Units/kg/hr (12/23/21 1618)         Objective:   Vitals: /72   Pulse 78   Temp 98 °F (36.7 °C)   Resp 18   Ht 5' 8\" (1.727 m)   Wt 168 lb 14.4 oz (76.6 kg)   SpO2 98%   BMI 25.68 kg/m²   General appearance: alert and cooperative with exam  Neck: no JVD or bruit  Thyroid : Normal lobes   Lungs: Has Vesicular Breath sounds   Heart:  regular rate and rhythm  Abdomen: soft, non-tender; bowel sounds normal; no masses,  no organomegaly  Musculoskeletal: Normal  Extremities: extremities normal, , no edema  Neurologic:  Awake, alert, oriented to name, place and time. Cranial nerves II-XII are grossly intact. Motor is  intact. Sensory is intact. ,  and gait is normal.    Assessment:     Patient Active Problem List:     Bilateral carotid artery stenosis     Essential hypertension     Other hyperlipidemia     Other dysphagia     Stage 4 chronic kidney disease (HCC)     GERD (gastroesophageal reflux disease)     Type 2 diabetes mellitus with kidney complication, with long-term current use of insulin ([address]     Acute respiratory failure with hypoxemia (HCC)     Hypoxia     Hyponatremia     Acute pulmonary edema ([address]      Plan:     1. Reviewed POC blood glucose . Labs and X ray results   2. Reviewed Current Medicines   3. On Correction bolus Humalog/ Insulin regime /oral hypoglycemic drug  4. Monitor Blood glucose frequently   5. Modified  the dose of Insulin/ other medicines as needed   6. Will follow     .      Demetrius Hayes MD, MD

## 2021-12-24 NOTE — PROGRESS NOTES
on coumadin subtherapeutic  RLE DVT now  Mild Increase in Troponins  Post herpetic neuralgia  Suspected Pneumonia  Right Pleuritic chest pain likely sec to PE  Left pleural effusion with consolidation ? Pneumonia s/p thoracentesis   ROSALIE spiculated nodule  YASMEEN on CKD       1. Abx  2. F/u C&S  3. F/u Cytology and fluid C&S  4. ICS  5. OOB  6. OP repeat CT chest  7. Await placement  8.  C/w present management    Electronically signed by Rachel Duarte MD on 12/24/2021 at 12:14 PM

## 2021-12-24 NOTE — PROGRESS NOTES
Nephrology Progress Note        [address], [address] [address]  Phone: [phone]  Office Hours: 8:30AM - 4:30PM  Monday - Friday 12/24/2021 10:19 AM  Subjective:   Admit Date: 12/19/2021  PCP: Genesis Boggs MD  Interval History: on nc  Appetite is good but HAS TROUBLE SWALLOWING  Son Wei at bedside-   Making urine  Diet: ADULT DIET; Dysphagia - Minced and Moist; 4 carb choices (60 gm/meal); Low Phosphorus (Less than 1000 mg); 60 to 80 gm      Data:   Scheduled Meds:   glipiZIDE  5 mg Oral QAM AC    warfarin  5 mg Oral Daily    azithromycin  250 mg Oral Daily    cilostazol  50 mg Oral BID    furosemide  60 mg Oral Daily    pantoprazole  40 mg Oral Daily    sodium chloride flush  5-40 mL IntraVENous 2 times per day    insulin lispro  0-6 Units SubCUTAneous TID WC    insulin lispro  0-3 Units SubCUTAneous 2 times per day     Continuous Infusions:   dextrose      sodium chloride       PRN Meds:oxyCODONE-acetaminophen, glucose, dextrose, glucagon (rDNA), dextrose, sodium chloride flush, sodium chloride, ondansetron **OR** ondansetron, polyethylene glycol, acetaminophen **OR** acetaminophen, heparin (porcine)  I/O last 3 completed shifts:  In: -   Out: 2160 [Urine:1410;  Other:750]  I/O this shift:  In: -   Out: 100 [Urine:100]    Intake/Output Summary (Last 24 hours) at 12/24/2021 1019  Last data filed at 12/24/2021 1012  Gross per 24 hour   Intake --   Output 2260 ml   Net -2260 ml       CBC:   Recent Labs     12/22/21  0705 12/24/21  0350   WBC 6.5 6.6   HGB 9.5* 9.7*    283       BMP:    Recent Labs     12/22/21  0705 12/23/21  0404 12/24/21  0350    133* 134*   K 4.7 4.6 4.8    97* 97*   CO2 27 27 26   BUN 35* 35* 33*   CREATININE 2.3* 2.5* 2.6*   GLUCOSE 69* 112* 110*     Objective:   Vitals: BP (!) 146/74   Pulse 85   Temp 98.1 °F (36.7 °C) (Oral)   Resp 14   Ht 5' 8\" (1.727 m)   Wt 168 lb 12.8 oz (76.6 kg)   SpO2 96%   BMI 25.67 kg/m²   General appearance: alert and cooperative with exam, in no acute distress  HEENT: normocephalic, atraumatic,   Neck: supple, trachea midline  Lungs: clear to auscultation bilaterally, breathing comfortably on nc  Heart[de-identified] regular rate and rhythm, S1, S2 normal,  Abdomen: soft, non-tender; non distended  Extremities: extremities atraumatic, no cyanosis or edema- edema much better  Neurologic: alert, oriented, follows commands, interactive    Assessment and Plan:     Patient Active Problem List    Diagnosis Date Noted    Acute respiratory failure with hypoxemia ([facility] [address] 12/20/2021    Hypoxia     Hyponatremia     Acute pulmonary edema ([address]     GERD (gastroesophageal reflux disease) 07/30/2021    Type 2 diabetes mellitus with kidney complication, with long-term current use of insulin ([facility] [address] 07/30/2021    Stage 4 chronic kidney disease ([facility] [address] 05/13/2021    Essential hypertension 05/12/2021    Other hyperlipidemia 05/12/2021    Other dysphagia 05/12/2021    Bilateral carotid artery stenosis 08/16/2016     Cr stable at 2.6- he has been in stage 4  Na 134- stable  K normal  Will cont lasix at the current dose                Electronically signed by Chris Casper MD on 12/24/2021 at 10:19 AM    [facility]  MD Fartun Ortez DO [address],  [facility]  [address], [place] [address]  PHONE: [phone]  FAX: [phone]

## 2021-12-24 NOTE — PROGRESS NOTES
PHARMACY ANTICOAGULATION MONITORING SERVICE    Otoniel Long is a 80 y.o. male on warfarin therapy for Hx of PE, DVT. Pharmacy consulted by Dr. Joann Curling for monitoring and adjustment of treatment. Indication for anticoagulation: Hx of PE, DVT  INR goal: 2-3  Warfarin dose prior to admission: 5mg daily    Pertinent Laboratory Values   Recent Labs     12/22/21  0705 12/23/21  0404 12/24/21  0350   INR 1.52   < > 2.43   HGB 9.5*  --  9.7*   HCT 32.5*  --  32.5*     --  283    < > = values in this interval not displayed. INR MONITORING  Recent Labs     12/22/21  0705 12/23/21  0404 12/24/21  0350   INR 1.52 1.93 2.43     Assessment/Plan:   Drug Interactions:   o Home meds:  cilostazol  o Pt started on azithromycin on 12/22 which can increase warfarin effects (delyaed effect)  o Heparin drip bridge to warfarin dc'd this am   INR with a fairly large jump to 2.43 today, now therapeutic.  Will give a slightly reduced dose of 4mg tonight, then look to resume home dose of warfarin 5mg daily tomorrow night.  HGB appears stable. [address] [place] [address] will continue to monitor and adjust warfarin therapy as indicated    Thank you for the consult. Sorin Martinez, [address] [facility]  12/24/2021 3:47 PM

## 2021-12-24 NOTE — PROGRESS NOTES
Comprehensive Nutrition Assessment    Type and Reason for Visit:  Initial,RD Nutrition Re-Screen/LOS    Nutrition Recommendations/Plan:   · Check phosphorus for appropriateness of diet   · Will modify to Pureed, Carb Control 5, TON diet   · Please document all PO intakes in I/O   · Recommend assist feeding as needed     Nutrition Assessment:  Admitted with acute respiratory failure and DVT. Hx of BCC, DMII, HTN, GERD, CKD4. Pt underwent thoracentesis this AM. SLP downgraded diet to pureed/thin liquids for patient's request. Currently on carb controlled, low phos, protein restriced diet. Good appetite with difficulty swallowing. Had swallowing issues over the last year. Noted 11% wt loss over 9 months likely r/t chronic illness and fluids. Pt was busy with toileting during visit. Will follow as high nutrition risk. Malnutrition Assessment:  Malnutrition Status: At risk for malnutrition (Comment)    Context:  Chronic Illness     Findings of the 6 clinical characteristics of malnutrition:  Energy Intake:  Mild decrease in energy intake (Comment)  Weight Loss:  1 - Mild weight loss (specify amount and time period) (11% in 9 months)     Body Fat Loss:  No significant body fat loss     Muscle Mass Loss:  Unable to assess    Fluid Accumulation:  Unable to assess     Strength:  Not Performed    Estimated Daily Nutrient Needs:  Energy (kcal):  5489-7760 (Patsy Montalvo); Weight Used for Energy Requirements:  Current     Protein (g):  77-91 (1.1-1.3 g/kg IBW); Weight Used for Protein Requirements:  Ideal        Fluid (ml/day):  fluids per nephrology; Method Used for Fluid Requirements:  Standard Renal      Nutrition Related Findings:  hx of pt reporting food would get caught in \"throat and chest\" per chart review; Na 134,       Wounds:  None       Current Nutrition Therapies:    ADULT DIET; Dysphagia - Pureed; 4 carb choices (60 gm/meal);  Low Phosphorus (Less than 1000 mg); 60 to 80 gm    Anthropometric Measures:  · Height: 5' 8\" (172.7 cm)  · Current Body Weight: 168 lb 14 oz (76.6 kg)   · Admission Body Weight: 168 lb (76.2 kg) (first measured)    · Usual Body Weight: 190 lb 0.6 oz (86.2 kg) (3/1/21)     · Ideal Body Weight: 154 lbs; % Ideal Body Weight 109.7 %   · BMI: 25.7  · BMI Categories: Overweight (BMI 25.0-29. 9)       Nutrition Diagnosis:   · Predicted inadequate energy intake related to swallowing difficulty as evidenced by swallow study results,poor intake prior to admission,weight loss    Nutrition Interventions:   Food and/or Nutrient Delivery:  Continue Current Diet  Nutrition Education/Counseling:  No recommendation at this time   Coordination of Nutrition Care:  Continue to monitor while inpatient,Coordination of Community Care,Feeding Assistance/Environment Change,Speech Therapy,Swallow Evaluation    Goals:  Pt will consume more than half of all meals       Nutrition Monitoring and Evaluation:   Behavioral-Environmental Outcomes:  None Identified   Food/Nutrient Intake Outcomes:  Food and Nutrient Intake,Supplement Intake  Physical Signs/Symptoms Outcomes:  Biochemical Data,Chewing or Swallowing,GI Status,Meal Time Behavior,Fluid Status or Edema,Weight   Discharge Planning:     Too soon to determine     Electronically signed by Brooklyn Ball RD, LD on 12/24/21 at 2:34 PM EST    Contact: [phone]

## 2021-12-24 NOTE — PROGRESS NOTES
Deepika Bain MD    Hospitalist Progress Note      Name:  Florian Lakhani /Age/Sex: 1929  (80 y.o. male)   MRN & CSN:  [id number] & [id number] Admission Date/Time: 2021 11:49 PM   Location:  6695/0183-E PCP: Daniel Torres MD       I saw and examined the patient on 2021 at 8:23 AM.    Hospital Day: 6    Assessment and Plan:     80 y.o.  male present with shortness of breath and admitted for hypoxic respiratory failure. Acute hypoxemic respiratory failure: Chest x-ray done on admission shows bilateral pulmonary opacities and bilateral pleural effusions which could be due to edema or pneumonia. Negative  SARS-CoV-2, nml WBC count in admisison. CT chest  No acute infiltrates. Low suspicion for pneumonia. 13 mm spiculated nodular density in the anterior aspect left upper lobe. S/p left-sided thoracentesis by IR. Follow-up infectious work-up and fluid status. Continue antibiotics. Cytology pending. Pulmonology following. Still requiring 6 L, baseline oxygen requirement on room air. Right lower extremity DVT. Doppler ultrasound shows DVT demonstrated in the right lower extremity involving the femoral popliteal and posterior tibial veins no DVT in the left lower extremity on 2021 he is on IV heparin which will continue. Discontinue heparin GTT, INR therapeutic. Of note patient was on Coumadin prior to admission per records  CAD /syncope  CKD 4  DM 2  Pprominent BNP was 1127 some elevation of troponins but not positive. Dr. Apolinar Lee  saw him for cardiology he wants to check echo and continue with anticoagulation for now. He has normal ejection fraction on echo. Coumadin for DVT prophylaxis  Full code  Barriers to resolve persistent hypoxia     Subjective:     Patient seen and examined at bedside. Reports feeling slightly better this morning. No chest pain. On 6 L nasal cannula this morning. No abnormal bleeding. Objective:      Intake/Output Summary (Last 24 hours) at 12/24/2021 0823  Last data filed at 12/24/2021 0647  Gross per 24 hour   Intake --   Output 2160 ml   Net -2160 ml      Vitals:   Vitals:    12/24/21 0748   BP: (!) 146/74   Pulse: 85   Resp: 14   Temp: 98.1 °F (36.7 °C)   SpO2: 96%       Ten point ROS reviewed negative, unless as noted above    Physical Exam:     GENERAL APPEARANCE: not in any acute distress,  appears comfortable. NECK: Supple, no JVD. CARDIOVASCULAR: Regular rate and rhythm, no murmurs, rubs or gallops  LUNGS: clear to auscultation bilaterally   ABDOMEN: normoactive bowel sounds, soft non-tender and non distended  EXTREMITIES: No edema  MUSCULOSKELETAL S: normal movement and normal bulk in all ext  NEUROLOGIC: Alert and oriented x 3- grossly non focal exam, moving all extremities   SKIN: No Rashes       Electronically signed by Renaldo Roldan MD on 12/24/2021 at 8:23 AM    Minimum 35 Minutes spent in preparation of following this patient including face to face encounter, discussions with the patient and/or family, medication reconciliation, and transition of care preparation.             Medications:   Medications:    glipiZIDE  5 mg Oral QAM AC    warfarin  5 mg Oral Daily    azithromycin  250 mg Oral Daily    cilostazol  50 mg Oral BID    furosemide  60 mg Oral Daily    pantoprazole  40 mg Oral Daily    sodium chloride flush  5-40 mL IntraVENous 2 times per day    insulin lispro  0-6 Units SubCUTAneous TID WC    insulin lispro  0-3 Units SubCUTAneous 2 times per day      Infusions:    dextrose      sodium chloride       PRN Meds: oxyCODONE-acetaminophen, 1 tablet, BID PRN  glucose, 15 g, PRN  dextrose, 12.5 g, PRN  glucagon (rDNA), 1 mg, PRN  dextrose, 100 mL/hr, PRN  sodium chloride flush, 5-40 mL, PRN  sodium chloride, 25 mL, PRN  ondansetron, 4 mg, Q8H PRN   Or  ondansetron, 4 mg, Q6H PRN  polyethylene glycol, 17 g, Daily PRN  acetaminophen, 650 mg, Q6H PRN   Or  acetaminophen, 650 mg, Q6H PRN  heparin (porcine), 40 Units/kg, PRN

## 2021-12-25 NOTE — PROGRESS NOTES
Nephrology Progress Note        [address], [address] [address]  Phone: [phone]  Office Hours: 8:30AM - 4:30PM  Monday - Friday 12/25/2021 11:08 AM  Subjective:   Admit Date: 12/19/2021  PCP: Betty Alfonso MD  Interval History: on nc  Making urine  Diet: ADULT DIET; Dysphagia - Pureed; 5 carb choices (75 gm/meal); No Added Salt (3-4 gm)      Data:   Scheduled Meds:   warfarin (COUMADIN) daily dosing (placeholder)   Other RX Placeholder    glipiZIDE  5 mg Oral QAM AC    azithromycin  250 mg Oral Daily    cilostazol  50 mg Oral BID    furosemide  60 mg Oral Daily    pantoprazole  40 mg Oral Daily    sodium chloride flush  5-40 mL IntraVENous 2 times per day    insulin lispro  0-6 Units SubCUTAneous TID WC    insulin lispro  0-3 Units SubCUTAneous 2 times per day     Continuous Infusions:   dextrose      sodium chloride       PRN Meds:oxyCODONE-acetaminophen, glucose, dextrose, glucagon (rDNA), dextrose, sodium chloride flush, sodium chloride, ondansetron **OR** ondansetron, polyethylene glycol, acetaminophen **OR** acetaminophen  I/O last 3 completed shifts:  In: -   Out: 400 [Urine:400]  No intake/output data recorded.     Intake/Output Summary (Last 24 hours) at 12/25/2021 1108  Last data filed at 12/24/2021 2305  Gross per 24 hour   Intake --   Output 300 ml   Net -300 ml       CBC:   Recent Labs     12/24/21  0350   WBC 6.6   HGB 9.7*          BMP:    Recent Labs     12/23/21  0404 12/24/21  0350 12/25/21  0229   * 134* 134*   K 4.6 4.8 4.7   CL 97* 97* 94*   CO2 27 26 29   BUN 35* 33* 34*   CREATININE 2.5* 2.6* 2.9*   GLUCOSE 112* 110* 109*     Objective:   Vitals: BP (!) 153/76   Pulse 81   Temp 98.6 °F (37 °C) (Oral)   Resp 16   Ht 5' 8\" (1.727 m)   Wt 167 lb 4.8 oz (75.9 kg)   SpO2 96%   BMI 25.44 kg/m²   General appearance: alert and cooperative with exam, in no acute distress  HEENT: normocephalic, atraumatic,   Neck: supple, trachea midline  Lungs: clear to auscultation bilaterally, breathing comfortably on nc  Heart[de-identified] regular rate and rhythm, S1, S2 normal,  Abdomen: soft, non-tender; non distended  Extremities: extremities atraumatic, no cyanosis or edema- edema much better  Neurologic: alert, oriented, follows commands, interactive    Assessment and Plan:     Patient Active Problem List    Diagnosis Date Noted    Acute respiratory failure with hypoxemia ([address] 12/20/2021    Hypoxia     Hyponatremia     Acute pulmonary edema ([address]     GERD (gastroesophageal reflux disease) 07/30/2021    Type 2 diabetes mellitus with kidney complication, with long-term current use of insulin ([address] 07/30/2021    Stage 4 chronic kidney disease ([facility] [address] 05/13/2021    Essential hypertension 05/12/2021    Other hyperlipidemia 05/12/2021    Other dysphagia 05/12/2021    Bilateral carotid artery stenosis 08/16/2016     Cr increased to 2.9- he has been in stage 4  Na 134- stable  K normal  Will cont lasix at the current dose  Can follow as OP from renal                Electronically signed by Chris Casper MD on 12/25/2021 at 11:08 MD Fartun Moran, DO [address],  [facility]  [address], [place] [address]  PHONE: [phone]  FAX: [phone]

## 2021-12-25 NOTE — PROGRESS NOTES
PHARMACY ANTICOAGULATION MONITORING SERVICE    Abran Kocher is a 80 y.o. male on warfarin therapy for Hx of PE, DVT. Pharmacy consulted by Dr. Yamilet Chavarria for monitoring and adjustment of treatment. Indication for anticoagulation: Hx of PE, DVT  INR goal: 2-3  Warfarin dose prior to admission: 5mg daily    Pertinent Laboratory Values   Recent Labs     12/24/21  0350 12/24/21  0350 12/25/21  0229   INR 2.43   < > 3.63   HGB 9.7*  --   --    HCT 32.5*  --   --      --   --     < > = values in this interval not displayed. Assessment/Plan:  Drug Interactions:   Home meds:  cilostazol  Pt started on azithromycin on 12/22 which can increase warfarin effects (delayed effect)  INR supratherapeutic today at 3.63  Hold warfarin at this time  Pharmacy will continue to monitor and adjust warfarin therapy as indicated    Thank you for the consult.   Pearson Brittle, [facility], [facility]  12/25/2021 7:52 AM

## 2021-12-25 NOTE — PROGRESS NOTES
Progress Note( Dr. Robert Ortiz)  12/24/2021  Subjective:   Admit Date: 12/19/2021  PCP: Remigio Ferrer MD    Admitted For :Acute respiratory failure hypoxia    Consulted For: Better control of blood glucose    Interval History: Feels somewhat better    Denies any chest pains,   Still SOB . Hypoxia  Denies nausea or vomiting. No new bowel or bladder symptoms. Intake/Output Summary (Last 24 hours) at 12/24/2021 1920  Last data filed at 12/24/2021 1012  Gross per 24 hour   Intake --   Output 850 ml   Net -850 ml       DATA    CBC:   Recent Labs     12/22/21  0705 12/24/21  0350   WBC 6.5 6.6   HGB 9.5* 9.7*    283    CMP:  Recent Labs     12/22/21  0705 12/23/21  0404 12/24/21  0350    133* 134*   K 4.7 4.6 4.8    97* 97*   CO2 27 27 26   BUN 35* 35* 33*   CREATININE 2.3* 2.5* 2.6*   CALCIUM 8.4 8.4 8.8     Lipids:   Lab Results   Component Value Date    CHOL 183 05/12/2021    HDL 32 05/12/2021    TRIG 163 05/12/2021     Glucose:  Recent Labs     12/24/21  0746 12/24/21  1150 12/24/21  1741   POCGLU 104* 150* 192*     [id number]:  Lab Results   Component Value Date    LABA1C 9.0 12/21/2021     High Sensitivity TSH: No results found for: TSHHS  Free T3: No results found for: FT3  Free T4:No results found for: T4FREE    XR CHEST PORTABLE   Final Result   Decrease in the left pleural effusion. No pneumothorax. IR GUIDED THORACENTESIS PLEURAL   Final Result   Successful ultrasound guided left thoracentesis. 750 mL clear serous   appearing left pleural fluid removed. Specimen sent for laboratory   evaluation. No complication suggested. XR CHEST PORTABLE   Final Result   Atelectasis or infiltrate in the left lung base with a left pleural effusion. Follow up to resolution is suggested. CT CHEST WO CONTRAST   Final Result   Moderate size left pleural effusion and trace right pleural effusion with   bibasilar atelectasis/pneumonitis.       13 mm spiculated nodular density in the anterior aspect left upper lobe. Although focal area of pneumonitis is possible, malignancy also possible. Of   the options below, would recommend short-term follow-up chest CT within 3   months to re-evaluate. Atherosclerosis. Large hiatal hernia. RECOMMENDATIONS:   Fleischner Society guidelines for follow-up and management of incidentally   detected pulmonary nodules:      Single Solid Nodule: In a low-risk patient, consider CT at 3 months, PET/CT, or tissue sampling. In a high-risk patient, consider CT at 3 months, PET/CT, or tissue sampling.      - Low risk patients include individuals with minimal or absent history of   smoking and other known risk factors. - High risk patients include individuals with a history or smoking or known   risk factors. Radiology 2017 http://pubs. rsna.org/doi/full/10.1148/radiol. 2765021328         VL DUP CAROTID BILATERAL   Final Result   The right internal carotid artery demonstrates near occlusion of the distal   right ICA. The left internal carotid artery demonstrates 50-69% stenosis. Bilateral vertebral arteries are patent with flow in the normal direction. NM LUNG VENT/PERFUSION (VQ)   Final Result   Low probability for pulmonary embolism. VL DUP LOWER EXTREMITY VENOUS BILATERAL   Final Result   1. DVT demonstrated in the right lower extremity involving the femoral,   popliteal and posterior tibial veins. 2. No evidence of DVT in the left lower extremity. Findings discussed with Dr Jonathon Deshpande on 12/20/2021 at 10:15 a.m.         XR CHEST PORTABLE   Final Result   1. Bilateral pulmonary opacities and bilateral pleural effusions, which could   be due to edema or pneumonia. 2. Cardiomegaly. 3. Large hiatal hernia.          XR CHEST PORTABLE    (Results Pending)        Scheduled Medicines   Medications:    [START ON 12/25/2021] warfarin  5 mg Oral Daily    glipiZIDE  5 mg Oral QAM AC    azithromycin  250 mg Oral Daily    cilostazol  50 mg Oral BID    furosemide  60 mg Oral Daily    pantoprazole  40 mg Oral Daily    sodium chloride flush  5-40 mL IntraVENous 2 times per day    insulin lispro  0-6 Units SubCUTAneous TID     insulin lispro  0-3 Units SubCUTAneous 2 times per day      Infusions:    dextrose      sodium chloride           Objective:   Vitals: BP (!) 113/57   Pulse 88   Temp 97.8 °F (36.6 °C) (Oral)   Resp 16   Ht 5' 8\" (1.727 m)   Wt 168 lb 12.8 oz (76.6 kg)   SpO2 96%   BMI 25.67 kg/m²   General appearance: alert and cooperative with exam  Neck: no JVD or bruit  Thyroid : Normal lobes   Lungs: Has Vesicular Breath sounds   Heart:  regular rate and rhythm  Abdomen: soft, non-tender; bowel sounds normal; no masses,  no organomegaly  Musculoskeletal: Normal  Extremities: extremities normal, , no edema  Neurologic:  Awake, alert, oriented to name, place and time. Cranial nerves II-XII are grossly intact. Motor is  intact. Sensory is intact. ,  and gait is normal.    Assessment:     Patient Active Problem List:     Bilateral carotid artery stenosis     Essential hypertension     Other hyperlipidemia     Other dysphagia     Stage 4 chronic kidney disease (HCC)     GERD (gastroesophageal reflux disease)     Type 2 diabetes mellitus with kidney complication, with long-term current use of insulin ([address]     Acute respiratory failure with hypoxemia (HCC)     Hypoxia     Hyponatremia     Acute pulmonary edema ([address]      Plan:     1. Reviewed POC blood glucose . Labs and X ray results   2. Reviewed Current Medicines   3. On Correction bolus Humalog Insulin regime /oral hypoglycemic drug  4. Monitor Blood glucose frequently   5. Modified  the dose of Insulin/ other medicines as needed   6. Will follow     .      Ru Galvez MD, MD

## 2021-12-25 NOTE — PROGRESS NOTES
Pulmonary and Critical Care  Progress Note      VITALS:  BP (!) 153/76   Pulse 81   Temp 98.6 °F (37 °C) (Oral)   Resp 16   Ht 5' 8\" (1.727 m)   Wt 167 lb 4.8 oz (75.9 kg)   SpO2 96%   BMI 25.44 kg/m²     Subjective:   CHIEF COMPLAINT :Right Pleuritic chest pain     HPI:                The patient is lying in the bed. He is not in acute resp distress    Objective:   PHYSICAL EXAM:    LUNGS:Occasional basal crackles  Abd-soft, BS+,NT  Ext- no pedal edema  CVS-s1s2, no murmurs      DATA:    CBC:  Recent Labs     12/24/21  0350   WBC 6.6   RBC 3.52*   HGB 9.7*   HCT 32.5*      MCV 92.3   MCH 27.6   MCHC 29.8*   RDW 14.0      BMP:  Recent Labs     12/23/21  0404 12/24/21  0350 12/25/21  0229   * 134* 134*   K 4.6 4.8 4.7   CL 97* 97* 94*   CO2 27 26 29   BUN 35* 33* 34*   CREATININE 2.5* 2.6* 2.9*   CALCIUM 8.4 8.8 9.1   GLUCOSE 112* 110* 109*      ABG:  No results for input(s): PH, PO2ART, UEB0HFZ, HCO3, BEART, O2SAT in the last 72 hours. BNP  Lab Results   Component Value Date    BNP 83 10/10/2012      D-Dimer:  Lab Results   Component Value Date    DDIMER 4357 (H) 12/20/2021      Radiology:   No significant interval change compared to the prior chest radiograph.       Small left pleural effusion.  Airspace opacities in the lungs     1.        Assessment/Plan     Patient Active Problem List    Diagnosis Date Noted    Acute respiratory failure with hypoxemia ([address] 12/20/2021    Hypoxia     Hyponatremia     Acute pulmonary edema (HCC)     GERD (gastroesophageal reflux disease) 07/30/2021    Type 2 diabetes mellitus with kidney complication, with long-term current use of insulin ([address] 07/30/2021    Stage 4 chronic kidney disease ([facility] 05/13/2021    Essential hypertension 05/12/2021    Other hyperlipidemia 05/12/2021    Other dysphagia 05/12/2021    Bilateral carotid artery stenosis 08/16/2016   Acute on chronic Hypoxic hypercapneic resp failure  Chronic Metabolic alkalosis  Moderate LA  Pulmonary congestion  H/o PE on coumadin subtherapeutic  RLE DVT now  Mild Increase in Troponins  Post herpetic neuralgia  Suspected Pneumonia  Right Pleuritic chest pain likely sec to PE  Left pleural effusion with consolidation ? Pneumonia s/p thoracentesis   ROSALIE spiculated nodule  YASMEEN on CKD       1. F/u cytlogy  2. Abx  3. F/u C&s  4. ICS  5. OOB  6. OP CT chest  7. Keep sats > 92%  8. Await placement  9.  C.w present management    Electronically signed by Joseph Lowery MD on 12/25/2021 at 12:30 PM

## 2021-12-25 NOTE — PROGRESS NOTES
Progress Note( Dr. Katelyn Hancock)  12/25/2021  Subjective:   Admit Date: 12/19/2021  PCP: Harmeet Arreola MD    Admitted For :Acute respiratory failure hypoxia    Consulted For: Better control of blood glucose    Interval History: Feels somewhat better    Denies any chest pains,   Still SOB . Hypoxia  Denies nausea or vomiting. No new bowel or bladder symptoms. Intake/Output Summary (Last 24 hours) at 12/25/2021 1422  Last data filed at 12/24/2021 2305  Gross per 24 hour   Intake --   Output 300 ml   Net -300 ml       DATA    CBC:   Recent Labs     12/24/21  0350   WBC 6.6   HGB 9.7*       CMP:  Recent Labs     12/23/21  0404 12/24/21  0350 12/25/21  0229   * 134* 134*   K 4.6 4.8 4.7   CL 97* 97* 94*   CO2 27 26 29   BUN 35* 33* 34*   CREATININE 2.5* 2.6* 2.9*   CALCIUM 8.4 8.8 9.1     Lipids:   Lab Results   Component Value Date    CHOL 183 05/12/2021    HDL 32 05/12/2021    TRIG 163 05/12/2021     Glucose:  Recent Labs     12/25/21  0221 12/25/21  0818 12/25/21  1220   POCGLU 105* 108* 142*     [id number]:  Lab Results   Component Value Date    LABA1C 9.0 12/21/2021     High Sensitivity TSH: No results found for: TSHHS  Free T3: No results found for: FT3  Free T4:No results found for: T4FREE    XR CHEST PORTABLE   Preliminary Result   No significant interval change compared to the prior chest radiograph. Small left pleural effusion. Airspace opacities in the lungs. XR CHEST PORTABLE   Final Result   Decrease in the left pleural effusion. No pneumothorax. IR GUIDED THORACENTESIS PLEURAL   Final Result   Successful ultrasound guided left thoracentesis. 750 mL clear serous   appearing left pleural fluid removed. Specimen sent for laboratory   evaluation. No complication suggested. XR CHEST PORTABLE   Final Result   Atelectasis or infiltrate in the left lung base with a left pleural effusion. Follow up to resolution is suggested.          CT CHEST WO CONTRAST   Final Result   Moderate size left pleural effusion and trace right pleural effusion with   bibasilar atelectasis/pneumonitis. 13 mm spiculated nodular density in the anterior aspect left upper lobe. Although focal area of pneumonitis is possible, malignancy also possible. Of   the options below, would recommend short-term follow-up chest CT within 3   months to re-evaluate. Atherosclerosis. Large hiatal hernia. RECOMMENDATIONS:   Fleischner Society guidelines for follow-up and management of incidentally   detected pulmonary nodules:      Single Solid Nodule: In a low-risk patient, consider CT at 3 months, PET/CT, or tissue sampling. In a high-risk patient, consider CT at 3 months, PET/CT, or tissue sampling.      - Low risk patients include individuals with minimal or absent history of   smoking and other known risk factors. - High risk patients include individuals with a history or smoking or known   risk factors. Radiology 2017 http://pubs. rsna.org/doi/full/10.1148/radiol. 8197302181         VL DUP CAROTID BILATERAL   Final Result   The right internal carotid artery demonstrates near occlusion of the distal   right ICA. The left internal carotid artery demonstrates 50-69% stenosis. Bilateral vertebral arteries are patent with flow in the normal direction. NM LUNG VENT/PERFUSION (VQ)   Final Result   Low probability for pulmonary embolism. VL DUP LOWER EXTREMITY VENOUS BILATERAL   Final Result   1. DVT demonstrated in the right lower extremity involving the femoral,   popliteal and posterior tibial veins. 2. No evidence of DVT in the left lower extremity. Findings discussed with Dr Norm Espino on 12/20/2021 at 10:15 a.m.         XR CHEST PORTABLE   Final Result   1. Bilateral pulmonary opacities and bilateral pleural effusions, which could   be due to edema or pneumonia. 2. Cardiomegaly. 3. Large hiatal hernia. Scheduled Medicines   Medications:    warfarin (COUMADIN) daily dosing (placeholder)   Other RX Placeholder    glipiZIDE  5 mg Oral QAM AC    azithromycin  250 mg Oral Daily    cilostazol  50 mg Oral BID    furosemide  60 mg Oral Daily    pantoprazole  40 mg Oral Daily    sodium chloride flush  5-40 mL IntraVENous 2 times per day    insulin lispro  0-6 Units SubCUTAneous TID WC    insulin lispro  0-3 Units SubCUTAneous 2 times per day      Infusions:    dextrose      sodium chloride           Objective:   Vitals: BP (!) 153/76   Pulse 81   Temp 98.6 °F (37 °C) (Oral)   Resp 16   Ht 5' 8\" (1.727 m)   Wt 167 lb 4.8 oz (75.9 kg)   SpO2 96%   BMI 25.44 kg/m²   General appearance: alert and cooperative with exam  Neck: no JVD or bruit  Thyroid : Normal lobes   Lungs: Has Vesicular Breath sounds   Heart:  regular rate and rhythm  Abdomen: soft, non-tender; bowel sounds normal; no masses,  no organomegaly  Musculoskeletal: Normal  Extremities: extremities normal, , no edema  Neurologic:  Awake, alert, oriented to name, place and time. Cranial nerves II-XII are grossly intact. Motor is  intact. Sensory is intact. ,  and gait is normal.    Assessment:     Patient Active Problem List:     Bilateral carotid artery stenosis     Essential hypertension     Other hyperlipidemia     Other dysphagia     Stage 4 chronic kidney disease (HCC)     GERD (gastroesophageal reflux disease)     Type 2 diabetes mellitus with kidney complication, with long-term current use of insulin ([address]     Acute respiratory failure with hypoxemia (HCC)     Hypoxia     Hyponatremia     Acute pulmonary edema ([address]      Plan:     1. Reviewed POC blood glucose . Labs and X ray results   2. Reviewed Current Medicines   3. On Correction bolus Humalog Insulin regime /oral hypoglycemic drug  4. Monitor Blood glucose frequently   5. Modified  the dose of Insulin/ other medicines as needed   6. Will follow     .      Aida Black MD, MD

## 2021-12-25 NOTE — PROGRESS NOTES
Maryellen Montgomery MD    Hospitalist Progress Note      Name:  Misael Lacy /Age/Sex: 1929  (80 y.o. male)   MRN & CSN:  [id number] & [id number] Admission Date/Time: 2021 11:49 PM   Location:  [address] [place] [address] PCP: Nina Zapata MD       I saw and examined the patient on 2021 at 9:39 AM.    Hospital Day: 7    Assessment and Plan:     80 y.o.  male present with shortness of breath and admitted for hypoxic respiratory failure. Acute hypoxemic respiratory failure: Chest x-ray done on admission shows bilateral pulmonary opacities and bilateral pleural effusions which could be due to edema or pneumonia. Negative  SARS-CoV-2, nml WBC count in admisison. CT chest  No acute infiltrates. Low suspicion for pneumonia. 13 mm spiculated nodular density in the anterior aspect left upper lobe. S/p left-sided thoracentesis by IR. Follow-up infectious work-up and fluid status. Continue antibiotics. Cytology pending. Pulmonology following. Still requiring 5 L, baseline oxygen requirement on room air. Continue antibiotics until culture results are available, clinically improving  Right lower extremity DVT. Doppler ultrasound shows DVT demonstrated in the right lower extremity involving the femoral popliteal and posterior tibial veins no DVT in the left lower extremity on 2021 he is on IV heparin which will continue. Discontinued heparin GTT , INR therapeutic. Of note patient was on Coumadin prior to admission per records. Supratherapeutic INR this morning  CAD /syncope  CKD 4-slight bump in creatinine from 2.6-> 2.9. DM 2  Pprominent BNP was 1127 some elevation of troponins but not positive. Dr. Beverley Johnston  saw him for cardiology he wants to check echo and continue with anticoagulation for now. He has normal ejection fraction on echo. Noted speech evaluation.        Coumadin for DVT prophylaxis  Full code    Called patient's son Carmen Gorman at [phone] and updated him regarding patient's condition, pending work-up including cultures and cytology. He verbalized understanding and appreciated update     Subjective:     Patient seen and examined at bedside. Overnight he was afebrile overnight. Improving oxygenation. No abnormal bleeding. Objective: Intake/Output Summary (Last 24 hours) at 12/25/2021 0939  Last data filed at 12/24/2021 2305  Gross per 24 hour   Intake --   Output 400 ml   Net -400 ml      Vitals:   Vitals:    12/25/21 0808   BP: (!) 153/76   Pulse: 81   Resp: 16   Temp: 98.6 °F (37 °C)   SpO2: 97%       Ten point ROS reviewed negative, unless as noted above    Physical Exam:     GENERAL APPEARANCE: not in any acute distress,  appears comfortable. NECK: Supple, no JVD.   CARDIOVASCULAR: Regular rate and rhythm, no murmurs, rubs or gallops  LUNGS: clear to auscultation bilaterally   ABDOMEN: normoactive bowel sounds, soft non-tender and non distended  EXTREMITIES: No edema  MUSCULOSKELETAL S: normal movement and normal bulk in all ext  NEUROLOGIC: Alert and oriented x 3- grossly non focal exam, moving all extremities   SKIN: No Rashes       Electronically signed by Sarai Holm MD on 12/25/2021 at 9:39 AM       Medications:   Medications:    warfarin (COUMADIN) daily dosing (placeholder)   Other RX Placeholder    glipiZIDE  5 mg Oral QAM AC    azithromycin  250 mg Oral Daily    cilostazol  50 mg Oral BID    furosemide  60 mg Oral Daily    pantoprazole  40 mg Oral Daily    sodium chloride flush  5-40 mL IntraVENous 2 times per day    insulin lispro  0-6 Units SubCUTAneous TID WC    insulin lispro  0-3 Units SubCUTAneous 2 times per day      Infusions:    dextrose      sodium chloride       PRN Meds: oxyCODONE-acetaminophen, 1 tablet, BID PRN  glucose, 15 g, PRN  dextrose, 12.5 g, PRN  glucagon (rDNA), 1 mg, PRN  dextrose, 100 mL/hr, PRN  sodium chloride flush, 5-40 mL, PRN  sodium chloride, 25 mL, PRN  ondansetron, 4 mg, Q8H PRN   Or  ondansetron, 4 mg, Q6H PRN  polyethylene glycol, 17 g, Daily PRN  acetaminophen, 650 mg, Q6H PRN   Or  acetaminophen, 650 mg, Q6H PRN

## 2021-12-26 NOTE — PLAN OF CARE
Problem: Falls - Risk of:  Goal: Will remain free from falls  Description: Will remain free from falls  Outcome: Completed  Goal: Absence of physical injury  Description: Absence of physical injury  Outcome: Completed     Problem: Nutrition  Goal: Optimal nutrition therapy  Outcome: Completed

## 2021-12-26 NOTE — PROGRESS NOTES
PHARMACY ANTICOAGULATION MONITORING SERVICE    Abran Kocher is a 80 y.o. male on warfarin therapy for Hx of PE, DVT. Pharmacy consulted by Dr. Yamilet Chavarria for monitoring and adjustment of treatment. Indication for anticoagulation: Hx of PE, DVT  INR goal: 2-3  Warfarin dose prior to admission: 5mg daily    Pertinent Laboratory Values   Recent Labs     12/24/21  0350 12/25/21  0229 12/26/21  0451   INR 2.43   < > 3.96   HGB 9.7*  --  10.3*   HCT 32.5*  --  34.9*     --  197    < > = values in this interval not displayed. Assessment/Plan:   Drug Interactions:   o Home meds:  cilostazol  o Pt started on azithromycin on 12/22 which can increase warfarin effects (delayed effect)   INR remains supra-therapeutic, trending upward @ 3.96   Continue to hold Warfarin for now  [address] [place] [address] will continue to monitor and adjust warfarin therapy as indicated    Thank you for the consult.   Bob Owen [facility]  12/26/2021 2:47 PM

## 2021-12-26 NOTE — PROGRESS NOTES
Nephrology Progress Note        [address] [address]  Phone: [phone]  Office Hours: 8:30AM - 4:30PM  Monday - Friday 12/26/2021 8:03 AM  Subjective:   Admit Date: 12/19/2021  PCP: Falguni Mccullough MD  Interval History: on nc  voiding  Diet: ADULT DIET; Dysphagia - Pureed; 5 carb choices (75 gm/meal); No Added Salt (3-4 gm)      Data:   Scheduled Meds:   warfarin (COUMADIN) daily dosing (placeholder)   Other RX Placeholder    glipiZIDE  5 mg Oral QAM AC    azithromycin  250 mg Oral Daily    cilostazol  50 mg Oral BID    furosemide  60 mg Oral Daily    pantoprazole  40 mg Oral Daily    sodium chloride flush  5-40 mL IntraVENous 2 times per day    insulin lispro  0-6 Units SubCUTAneous TID WC    insulin lispro  0-3 Units SubCUTAneous 2 times per day     Continuous Infusions:   dextrose      sodium chloride       PRN Meds:oxyCODONE-acetaminophen, glucose, dextrose, glucagon (rDNA), dextrose, sodium chloride flush, sodium chloride, ondansetron **OR** ondansetron, polyethylene glycol, acetaminophen **OR** acetaminophen  No intake/output data recorded. No intake/output data recorded.   No intake or output data in the 24 hours ending 12/26/21 0803    CBC:   Recent Labs     12/24/21  0350 12/26/21  0451   WBC 6.6 6.1   HGB 9.7* 10.3*    197       BMP:    Recent Labs     12/24/21  0350 12/25/21  0229 12/26/21  0451   * 134* 138   K 4.8 4.7 4.7   CL 97* 94* 97*   CO2 26 29 30   BUN 33* 34* 36*   CREATININE 2.6* 2.9* 3.2*   GLUCOSE 110* 109* 117*     Objective:   Vitals: /72   Pulse 80   Temp 98 °F (36.7 °C) (Oral)   Resp 16   Ht 5' 8\" (1.727 m)   Wt 167 lb (75.8 kg)   SpO2 91%   BMI 25.39 kg/m²   General appearance: alert and cooperative with exam, in no acute distress  HEENT: normocephalic, atraumatic,   Neck: supple, trachea midline  Lungs: clear to auscultation bilaterally, breathing comfortably on nc  Heart[de-identified] regular rate and rhythm, S1, S2 normal,  Abdomen: soft, non-tender; non distended  Extremities: extremities atraumatic, no cyanosis or edema- edema much better  Neurologic: alert, oriented, follows commands, interactive    Assessment and Plan:     Patient Active Problem List    Diagnosis Date Noted    [facility] 12/20/2021    Hypoxia     Hyponatremia     [facility]     GERD (gastroesophageal reflux disease) 07/30/2021    Type 2 diabetes mellitus with kidney complication, with long-term current use of insulin ([facility] 07/30/2021    Stage 4 chronic kidney disease ([facility] 05/13/2021    Essential hypertension 05/12/2021    Other hyperlipidemia 05/12/2021    Other dysphagia 05/12/2021    Bilateral carotid artery stenosis 08/16/2016     Labs reviewed  Creat is now 3.2 likely from over diuresis  Clinically not in CHF  Na 138  Will decrease lasix to 40 mg daily              Electronically signed by Ciara Sinclair MD on 12/26/2021 at 8:03 AM    [address],  [facility]  [address], [place] [address]  PHONE: [phone]  FAX: [phone]

## 2021-12-26 NOTE — PROGRESS NOTES
Progress Note( Dr. Katelyn Hancock)  12/26/2021  Subjective:   Admit Date: 12/19/2021  PCP: Harmeet Arreola MD    Admitted For :Acute respiratory failure hypoxia    Consulted For: Better control of blood glucose    Interval History: Feels somewhat better    Denies any chest pains,   Still SOB . Hypoxia  Denies nausea or vomiting. No new bowel or bladder symptoms. No intake or output data in the 24 hours ending 12/26/21 1428    DATA    CBC:   Recent Labs     12/24/21  0350 12/26/21  0451   WBC 6.6 6.1   HGB 9.7* 10.3*    197    CMP:  Recent Labs     12/24/21  0350 12/25/21  0229 12/26/21  0451   * 134* 138   K 4.8 4.7 4.7   CL 97* 94* 97*   CO2 26 29 30   BUN 33* 34* 36*   CREATININE 2.6* 2.9* 3.2*   CALCIUM 8.8 9.1 8.9     Lipids:   Lab Results   Component Value Date    CHOL 183 05/12/2021    HDL 32 05/12/2021    TRIG 163 05/12/2021     Glucose:  Recent Labs     12/26/21  0306 12/26/21  0819 12/26/21  1128   POCGLU 125* 118* 175*     [id number]:  Lab Results   Component Value Date    LABA1C 9.0 12/21/2021     High Sensitivity TSH: No results found for: TSHHS  Free T3: No results found for: FT3  Free T4:No results found for: T4FREE    XR CHEST PORTABLE   Preliminary Result   No significant interval change compared to the prior chest radiograph. Small left pleural effusion. Airspace opacities in the lungs. XR CHEST PORTABLE   Final Result   Decrease in the left pleural effusion. No pneumothorax. IR GUIDED THORACENTESIS PLEURAL   Final Result   Successful ultrasound guided left thoracentesis. 750 mL clear serous   appearing left pleural fluid removed. Specimen sent for laboratory   evaluation. No complication suggested. XR CHEST PORTABLE   Final Result   Atelectasis or infiltrate in the left lung base with a left pleural effusion. Follow up to resolution is suggested.          CT CHEST WO CONTRAST   Final Result   Moderate size left pleural effusion and trace right pleural effusion with   bibasilar atelectasis/pneumonitis. 13 mm spiculated nodular density in the anterior aspect left upper lobe. Although focal area of pneumonitis is possible, malignancy also possible. Of   the options below, would recommend short-term follow-up chest CT within 3   months to re-evaluate. Atherosclerosis. Large hiatal hernia. RECOMMENDATIONS:   Fleischner Society guidelines for follow-up and management of incidentally   detected pulmonary nodules:      Single Solid Nodule: In a low-risk patient, consider CT at 3 months, PET/CT, or tissue sampling. In a high-risk patient, consider CT at 3 months, PET/CT, or tissue sampling.      - Low risk patients include individuals with minimal or absent history of   smoking and other known risk factors. - High risk patients include individuals with a history or smoking or known   risk factors. Radiology 2017 http://pubs. rsna.org/doi/full/10.1148/radiol. 4261153345         VL DUP CAROTID BILATERAL   Final Result   The right internal carotid artery demonstrates near occlusion of the distal   right ICA. The left internal carotid artery demonstrates 50-69% stenosis. Bilateral vertebral arteries are patent with flow in the normal direction. NM LUNG VENT/PERFUSION (VQ)   Final Result   Low probability for pulmonary embolism. VL DUP LOWER EXTREMITY VENOUS BILATERAL   Final Result   1. DVT demonstrated in the right lower extremity involving the femoral,   popliteal and posterior tibial veins. 2. No evidence of DVT in the left lower extremity. Findings discussed with Dr Jonathon Deshpande on 12/20/2021 at 10:15 a.m.         XR CHEST PORTABLE   Final Result   1. Bilateral pulmonary opacities and bilateral pleural effusions, which could   be due to edema or pneumonia. 2. Cardiomegaly. 3. Large hiatal hernia.               Scheduled Medicines   Medications:    furosemide  40 mg Oral Daily  warfarin (COUMADIN) daily dosing (placeholder)   Other RX Placeholder    glipiZIDE  5 mg Oral QAM AC    cilostazol  50 mg Oral BID    pantoprazole  40 mg Oral Daily    sodium chloride flush  5-40 mL IntraVENous 2 times per day    insulin lispro  0-6 Units SubCUTAneous TID WC    insulin lispro  0-3 Units SubCUTAneous 2 times per day      Infusions:    dextrose      sodium chloride           Objective:   Vitals: /87   Pulse 93   Temp 97.9 °F (36.6 °C) (Oral)   Resp 18   Ht 5' 8\" (1.727 m)   Wt 167 lb (75.8 kg)   SpO2 96%   BMI 25.39 kg/m²   General appearance: alert and cooperative with exam  Neck: no JVD or bruit  Thyroid : Normal lobes   Lungs: Has Vesicular Breath sounds   Heart:  regular rate and rhythm  Abdomen: soft, non-tender; bowel sounds normal; no masses,  no organomegaly  Musculoskeletal: Normal  Extremities: extremities normal, , no edema  Neurologic:  Awake, alert, oriented to name, place and time. Cranial nerves II-XII are grossly intact. Motor is  intact. Sensory is intact. ,  and gait is normal.    Assessment:     Patient Active Problem List:     Bilateral carotid artery stenosis     Essential hypertension     Other hyperlipidemia     Other dysphagia     Stage 4 chronic kidney disease (HCC)     GERD (gastroesophageal reflux disease)     Type 2 diabetes mellitus with kidney complication, with long-term current use of insulin ([address]     Acute respiratory failure with hypoxemia (HCC)     Hypoxia     Hyponatremia     Acute pulmonary edema ([address]      Plan:     1. Reviewed POC blood glucose . Labs and X ray results   2. Reviewed Current Medicines   3. On Correction bolus Humalog Insulin regime /oral hypoglycemic drug  4. Monitor Blood glucose frequently   5. Modified  the dose of Insulin/ other medicines as needed   6. Will follow     .      Mike Rosas MD, MD

## 2021-12-26 NOTE — PROGRESS NOTES
Doctor Please copy and paste below in your progress note per DME requirements. Patient was seen in hospital for Respiratory Failure. I am prescribing oxygen because the diagnosis and testing requires the patient to have oxygen in the home. Conditions will improve or be benefited by oxygen use. The patient is able to perform good mobility and therefore requires the use of a portable oxygen system for ambulation.

## 2021-12-26 NOTE — PROGRESS NOTES
Pulmonary and Critical Care  Progress Note      VITALS:  /87   Pulse 93   Temp 97.9 °F (36.6 °C) (Oral)   Resp 18   Ht 5' 8\" (1.727 m)   Wt 167 lb (75.8 kg)   SpO2 96%   BMI 25.39 kg/m²     Subjective:   CHIEF COMPLAINT :Right Pleuritic chest pain     HPI:                The patient is sitting in the bed. He is not in acute resp distress    Objective:   PHYSICAL EXAM:    LUNGS:Occasional basal crackles  Abd-soft, BS+,NT  Ext- no pedal edema  CVS-s1s2, no murmurs      DATA:    CBC:  Recent Labs     12/24/21  0350 12/26/21  0451   WBC 6.6 6.1   RBC 3.52* 3.80*   HGB 9.7* 10.3*   HCT 32.5* 34.9*    197   MCV 92.3 91.8   MCH 27.6 27.1   MCHC 29.8* 29.5*   RDW 14.0 13.8      BMP:  Recent Labs     12/24/21  0350 12/25/21 0229 12/26/21 0451   * 134* 138   K 4.8 4.7 4.7   CL 97* 94* 97*   CO2 26 29 30   BUN 33* 34* 36*   CREATININE 2.6* 2.9* 3.2*   CALCIUM 8.8 9.1 8.9   GLUCOSE 110* 109* 117*      ABG:  No results for input(s): PH, PO2ART, RTJ9AQK, HCO3, BEART, O2SAT in the last 72 hours. BNP  Lab Results   Component Value Date    BNP 83 10/10/2012      D-Dimer:  Lab Results   Component Value Date    DDIMER 3267 (H) 12/20/2021      1.  Radiology: None      Assessment/Plan     Patient Active Problem List    Diagnosis Date Noted    Acute respiratory failure with hypoxemia ([facility] 12/20/2021    Hypoxia     Hyponatremia     Acute pulmonary edema (HCC)     GERD (gastroesophageal reflux disease) 07/30/2021    Type 2 diabetes mellitus with kidney complication, with long-term current use of insulin ([facility] 07/30/2021    Stage 4 chronic kidney disease ([facility] 05/13/2021    Essential hypertension 05/12/2021    Other hyperlipidemia 05/12/2021    Other dysphagia 05/12/2021    Bilateral carotid artery stenosis 08/16/2016   Acute on chronic Hypoxic hypercapneic resp failure  Chronic Metabolic alkalosis  Moderate ME  Pulmonary congestion  H/o PE on coumadin subtherapeutic  RLE DVT now  Mild Increase in Troponins  Post herpetic neuralgia  Suspected Pneumonia  Right Pleuritic chest pain likely sec to PE  Left pleural effusion with consolidation ? Pneumonia s/p thoracentesis   ROSALIE spiculated nodule  YASMEEN on CKD       1. Abx  2. F/u C&S  3. F/u Cytology  4. OP CT chest  5. ICS  6. OOB  7. Keep sats > 92%  8. Await placement  9.  C.w present management    Electronically signed by Maddie Becerra MD on 12/26/2021 at 12:22 PM

## 2021-12-26 NOTE — PROGRESS NOTES
Patient placed on room for home O2 eval.  Patient\"s SpO2 dropped to 87% after 5 minutes.   Patient placed back on 2 LPM.

## 2021-12-26 NOTE — DISCHARGE SUMMARY
Discharge Summary  Yanci Long MD     Name:  Romeo Hoffman /Age/Sex: 1929  (80 y.o. male)   MRN & CSN:  [id number] & [id number] Admission Date/Time: 2021 11:49 PM   Attending:  Yanci Long MD Discharging Physician: Yanci Long MD     Hospital Course:     80 y.o.  male present with shortness of breath and admitted for hypoxic respiratory failure. Acute hypoxemic respiratory failure: Chest x-ray done on admission shows bilateral pulmonary opacities and bilateral pleural effusions. Negative  SARS-CoV-2, nml WBC count in admisison. CT chest  No acute infiltrates. Low suspicion for pneumonia. 13 mm spiculated nodular density in the anterior aspect left upper lobe. S/p left-sided thoracentesis by IR. Follow-up infectious work-up and fluid analysis. Infectious workup negative to date. Cytology pending. Pulmonology following. Discussed with pulmonology, plan is to continue antibiotics until culture results are back to complete total of 7 days. Patient will follow up with pulmonology for pending results including cytology during outpatient visit. With patient's history of recurrent DVT and nodule morphology- malignancy needs to be ruled out. Right lower extremity DVT. History of DVTs in the past.  Doppler ultrasound shows DVT demonstrated in the right lower extremity involving the femoral popliteal and posterior tibial veins no DVT in the left lower extremity on 2021. Managed with heparin/Coumadin. Now INR is therapeutic. Will be discharge on Coumadin to follow-up with PCP/Dr. Devin Nichols for INR monitoring. Advised pt to take first dose on  after checking INR on   CAD /syncope. Currently asymptomatic  CKD 4-slight bump in creatinine from 2.6-> 2.9. DM 2  History of carotid artery endarterectomy of right side. Carotid artery shows complete origin of right-sided and partial occlusion(50-69%) of left side. Patient used to follow vascular surgeon as outpatient.   Had a lengthy discussion with patient, patient to follow same vascular surgeon as outpatient who did previous surgery. Mild supratherapeutic INR upon discharge. Likely due to azithromycin which should be discontinued today. INR 3.69 despite skipping dose of Coumadin ED yesterday evening. Advised patient to repeat INR with PCP on 12/28/2021. Hold Coumadin until that time, and take first dose from 12/29 further adjustments in Coumadin dose to be made by PCP during follow-up visit        Spoke with patient and his son Etienne Garcia, reemphasized need to follow-up with pulmonologist for cytology results as well as further work-up for lung nodule, as well as INR monitoring with Dr. Acosta [facility] physician and vascular surgeon. Both verbalized understanding. Patient was seen in hospital for Respiratory Failure. I am prescribing oxygen because the diagnosis and testing requires the patient to have oxygen in the home. Conditions will improve or be benefited by oxygen use. The patient is able to perform good mobility and therefore requires the use of a portable oxygen system for ambulation.          Consults this admission:  IP CONSULT TO HOSPITALIST  IP CONSULT TO [address] [place] [address] PULMONOLOGY    Discharge Instruction:     Follow up appointments:    Mariusz Snider MD  [address] [place] [address]  [phone]    In 1 month      Pierre Molina MD  [address] [facility]  [phone]    In 2 days  INR check      Follow-up with your vascular surgeon regarding    Diet: Reguar    Activity: activity as tolerated    Disposition: Discharged to:   [x]Home , [x][facility], []SNF, []Acute Rehab, []Hospice     Condition on discharge: Stable    Discharge Medications:        Medication List      START taking these medications    amoxicillin-clavulanate 250-125 MG per tablet  Commonly known as: AUGMENTIN  Take 1 tablet by mouth 2 times daily for 3 days CHANGE how you take these medications    furosemide 20 MG tablet  Commonly known as: LASIX  Take 2 tablets by mouth every morning  What changed:   how much to take  additional instructions  Another medication with the same name was removed. Continue taking this medication, and follow the directions you see here. warfarin 5 MG tablet  Commonly known as: COUMADIN  Take as directed per INR  Start taking on: December 29, 2021  What changed: These instructions start on December 29, 2021. If you are unsure what to do until then, ask your doctor or other care provider.         CONTINUE taking these medications    atenolol 50 MG tablet  Commonly known as: TENORMIN  Take 1 tablet by mouth daily     atorvastatin 80 MG tablet  Commonly known as: LIPITOR  Take 1 tablet by mouth daily     cilostazol 50 MG tablet  Commonly known as: PLETAL  Take 1 tablet by mouth 2 times daily     ferrous sulfate 325 (65 Fe) MG tablet  Commonly known as: IRON 325     gabapentin 300 MG capsule  Commonly known as: NEURONTIN     glyBURIDE 5 MG tablet  Commonly known as: DIABETA  Take 1 tablet by mouth 2 times daily     NovoLOG FlexPen 100 UNIT/ML injection pen  Generic drug: insulin aspart  Inject 8 Units into the skin 3 times daily (before meals)     OneTouch Delica Lancets Fine Misc     OneTouch Verio strip  Generic drug: blood glucose test strips     oxyCODONE-acetaminophen 7.5-325 MG per tablet  Commonly known as: PERCOCET     pantoprazole 40 MG tablet  Commonly known as: PROTONIX     SITagliptin 50 MG tablet  Commonly known as: Januvia  TAKE ONE TABLET BY MOUTH ONCE EVERY DAY     Tresiba FlexTouch 200 UNIT/ML Sopn  Generic drug: Insulin Degludec  Inject 25 Units into the skin nightly        STOP taking these medications    glimepiride 4 MG tablet  Commonly known as: AMARYL     simvastatin 80 MG tablet  Commonly known as: ZOCOR           Where to Get Your Medications      These medications were sent to [address] [place] [address] - [address] [facility],Third [facility]  [id number] [place] [id number]    Phone: [phone]   furosemide 20 MG tablet     You can get these medications from any pharmacy    Bring a paper prescription for each of these medications  amoxicillin-clavulanate 250-125 MG per tablet     Information about where to get these medications is not yet available    Ask your nurse or doctor about these medications  warfarin 5 MG tablet          Objective Findings at Discharge:     /87   Pulse 93   Temp 97.9 °F (36.6 °C) (Oral)   Resp 18   Ht 5' 8\" (1.727 m)   Wt 167 lb (75.8 kg)   SpO2 96%   BMI 25.39 kg/m²            PHYSICAL EXAM   GENERAL APPEARANCE: not in any acute distress,  appears comfortable. NECK: Supple, no JVD. CARDIOVASCULAR: Regular rate and rhythm, no murmurs, rubs or gallops  LUNGS: clear to auscultation bilaterally   ABDOMEN: normoactive bowel sounds, soft non-tender and non distended  EXTREMITIES: No edema  MUSCULOSKELETAL S: normal movement and normal bulk in all ext  NEUROLOGIC: Alert and oriented x 3- grossly non focal exam, moving all extremities   SKIN: No Rashes           LABS: Reviewd    IMAGING: Reviwed    32  Minutes spent in preparation of discharging this patient including face to face encounter, discussions with the patient/family, medication reconciliation, and transition of care preparation.      Electronically signed by Anastasia Castillo MD on 12/26/2021 at 3:15 PM

## 2021-12-27 NOTE — CARE COORDINATION
[facility] Transitions Initial Follow Up Call    Call within 2 business days of discharge: Yes    Patient: Elise Samuels Patient : 1929   MRN: [id number]  Reason for Admission: ? Pna vs Pl Effusion, Ac Resp Failure, RLE DVT  Discharge Date: 21 RARS: Readmission Risk Score: 17.7 ( )  Facility: [address] [place] [address]       Complaint Diagnosis Description Type Department Provider    21 Chest Pain; Shortness of Breath Acute pulmonary edema (HCC) . .. ED to Hosp-Admission (Discharged) (ADMITTED) Dave Giang MD; Alexandr Perez,... Future Appointments   Date Time Provider Brian Zamora   2021  1:30 PM Timoteo Ward MD [facility]     1st attempt to reach for Care Transition discharge call unsuccessful. HIPAA compliant message left requesting call back.      Husam Lynn RN

## 2021-12-28 NOTE — PROGRESS NOTES
12/28/2021    [facility]    Chief Complaint   Patient presents with    Other     hosp f/u hypoxemic respiratory failure    Other     need to discuss portable O2       HPI  History was obtained from the patient and his son. Andreina Chappell is a 80 y.o. male who presents today with history of recent hospitalization from 1219 through 26 at [facility]. He was hospitalized for respiratory failure found to have a left upper lobe spiculated mass also had a right DVT and does need continued on Coumadin. Patient with known CKD 4, diabetes mellitus type 2, carotid disease and history of mild CHF. Patient also has a past history of hypertension & advanced age. Today patient states he is feeling better and appetite remains good he has portable oxygen would like to have small portable oxygen concentrator just because the other tank is just too heavy for him to maneuver. He had been on antibiotic and was sent home on Augmentin by the dose that the pharmacy could not fill he would certainly benefit from home health care and initiation of nursing and PT OT on review as he takes insulin a sensor system for checking home blood sugars it would be appropriate. REVIEW OF SYMPTOMS    Review of Systems   Constitutional: Negative for activity change and fatigue. HENT: Negative for congestion, hearing loss, mouth sores and trouble swallowing. Eyes: Negative for pain and visual disturbance. Respiratory: Positive for cough and shortness of breath. Negative for chest tightness and wheezing. Cardiovascular: Negative for chest pain and palpitations. Gastrointestinal: Negative for abdominal pain, blood in stool, diarrhea, nausea and vomiting. Endocrine: Negative for polydipsia and polyuria. Genitourinary: Negative for dysuria, frequency and urgency. Musculoskeletal: Negative for arthralgias, gait problem and neck stiffness. Skin: Negative for rash. Allergic/Immunologic: Negative for environmental allergies. Neurological: Negative for dizziness, seizures, facial asymmetry, speech difficulty and weakness. Hematological: Does not bruise/bleed easily. Psychiatric/Behavioral: Negative for agitation, confusion, dysphoric mood, hallucinations and suicidal ideas. The patient is not nervous/anxious. PAST MEDICAL HISTORY  Past Medical History:   Diagnosis Date    CAD (coronary artery disease)     Diabetes mellitus ([facility]     Hyperlipidemia     Hypertension        FAMILY HISTORY  Family History   Problem Relation Age of Onset    No Known Problems Mother     Stroke Father        SOCIAL HISTORY  Social History     Socioeconomic History    Marital status:      Spouse name: Not on file    Number of children: Not on file    Years of education: Not on file    Highest education level: Not on file   Occupational History    Not on file   Tobacco Use    Smoking status: Never Smoker    Smokeless tobacco: Never Used   Substance and Sexual Activity    Alcohol use: No    Drug use: No    Sexual activity: Not on file   Other Topics Concern    Not on file   Social History Narrative    Not on file     Social Determinants of Health     Financial Resource Strain: Low Risk     Difficulty of Paying Living Expenses: Not hard at all   Food Insecurity: No Food Insecurity    Worried About Running Out of Food in the Last Year: Never true    Finn of Food in the Last Year: Never true   Transportation Needs:     Lack of Transportation (Medical): Not on file    Lack of Transportation (Non-Medical):  Not on file   Physical Activity:     Days of Exercise per Week: Not on file    Minutes of Exercise per Session: Not on file   Stress:     Feeling of Stress : Not on file   Social Connections:     Frequency of Communication with Friends and Family: Not on file    Frequency of Social Gatherings with Friends and Family: Not on file    Attends Mormonism Services: Not on file    Active Member of Clubs or Organizations: Not on file    Attends Club or Organization Meetings: Not on file    Marital Status: Not on file   Intimate Partner Violence:     Fear of Current or Ex-Partner: Not on file    Emotionally Abused: Not on file    Physically Abused: Not on file    Sexually Abused: Not on file   Housing Stability:     Unable to Pay for Housing in the Last Year: Not on file    Number of Delmy in the Last Year: Not on file    Unstable Housing in the Last Year: Not on file        SURGICAL HISTORY  Past Surgical History:   Procedure Laterality Date    ABDOMEN SURGERY      hernia repair    CAROTID ENDARTERECTOMY  2008    Right    CORONARY ANGIOPLASTY WITH STENT PLACEMENT      2 stents    INGUINAL HERNIA REPAIR Left                  CURRENT MEDICATIONS  Current Outpatient Medications   Medication Sig Dispense Refill    glimepiride (AMARYL) 4 MG tablet Take 1 tablet by mouth every morning (before breakfast) 90 tablet 1    atenolol (TENORMIN) 50 MG tablet Take 1 tablet by mouth daily 90 tablet 1    Insulin Degludec (TRESIBA FLEXTOUCH) 200 UNIT/ML SOPN Inject 25 Units into the skin nightly 3 pen 2    amoxicillin-clavulanate (AUGMENTIN) 875-125 MG per tablet Take 1 tablet by mouth 2 times daily for 7 days 14 tablet 0    [START ON 12/29/2021] warfarin (COUMADIN) 5 MG tablet Take as directed per INR 20 tablet 0    furosemide (LASIX) 20 MG tablet Take 2 tablets by mouth every morning 90 tablet 1    cilostazol (PLETAL) 50 MG tablet Take 1 tablet by mouth 2 times daily 180 tablet 1    insulin aspart (NOVOLOG FLEXPEN) 100 UNIT/ML injection pen Inject 8 Units into the skin 3 times daily (before meals) 5 pen 1    atorvastatin (LIPITOR) 80 MG tablet Take 1 tablet by mouth daily 90 tablet 1    gabapentin (NEURONTIN) 300 MG capsule Take 300 mg by mouth 3 times daily.  oxyCODONE-acetaminophen (PERCOCET) 7.5-325 MG per tablet Take 1 tablet by mouth 2 times daily as needed for Pain.  Indications: Son reports Patient takes 3 x daily routinely Avoid Alcohol. ..causes drowsiness.  ONETOUCH DELICA LANCETS FINE MISC 1 each 3 times daily and as needed.  blood glucose test strips (ONETOUCH VERIO) strip 1 each 3 times daily and as  needed. No current facility-administered medications for this visit. ALLERGIES  No Known Allergies    PHYSICAL EXAM    BP (!) 80/44   Pulse 85   SpO2 98%     Physical Exam  Vitals and nursing note reviewed. Constitutional:       General: He is not in acute distress. Appearance: He is well-developed. He is not ill-appearing or toxic-appearing. Comments: Patient was alert but was seen in chair. Did need assist to make position changes. HENT:      Head: Normocephalic and atraumatic. Nose: Nose normal.      Mouth/Throat:      Mouth: Mucous membranes are moist.      Pharynx: Oropharynx is clear. Eyes:      Pupils: Pupils are equal, round, and reactive to light. Cardiovascular:      Rate and Rhythm: Normal rate and regular rhythm. Heart sounds: Normal heart sounds. No murmur heard. No gallop. Pulmonary:      Effort: Respiratory distress present. Breath sounds: Normal breath sounds. No wheezing, rhonchi or rales. Abdominal:      Palpations: Abdomen is soft. Musculoskeletal:         General: No swelling or deformity. Normal range of motion. Cervical back: Normal range of motion and neck supple. No rigidity. Lymphadenopathy:      Cervical: No cervical adenopathy. Skin:     General: Skin is warm and dry. Coloration: Skin is not jaundiced. Findings: No bruising. Neurological:      General: No focal deficit present. Mental Status: He is alert and oriented to person, place, and time. Mental status is at baseline. Cranial Nerves: No cranial nerve deficit. Motor: Weakness present. Gait: Gait abnormal.   Psychiatric:         Mood and Affect: Mood normal.         Thought Content:  Thought content normal.         ASSESSMENT & PLAN     Diagnosis Orders   1. Essential hypertension  CBC    COMPREHENSIVE METABOLIC PANEL   2. Stage 4 chronic kidney disease (HCC)  COMPREHENSIVE METABOLIC PANEL   3. Type 2 diabetes mellitus with stage 4 chronic kidney disease, with long-term current use of insulin ([facility]     4. History of recent hospitalization     5. Mass of upper lobe of left lung     6. Acute deep vein thrombosis (DVT) of distal vein of right lower extremity ([facility])  POCT INR   7. Bilateral carotid artery stenosis     8. Gastroesophageal reflux disease, unspecified whether esophagitis present     9. Type 2 diabetes mellitus without complication, without long-term current use of insulin ([facility])  HEMOGLOBIN A1C    Insulin Degludec (TRESIBA FLEXTOUCH) 200 UNIT/ML SOPN   At this point will check INR, CMP, CBC, and A1c and have him follow-up for results -he will be restarting his Coumadin shortly reminded to see pulmonary specialist Dr. Cheri Kwan in regards to his lung findings and left upper lobe mass. He is to see Dr. Robinson Gao or one of his partners from department of vascular surgery in regards to significantly severe carotid disease. He may well not be a surgical candidate. He needs home O2 that is more usable- we will try to get him a portable O2 concentrator if his insurance will provide that. I am going to restart his Augmentin at 875 1 p.o. twice daily for 1 week. We will set up [facility] home care for PT, nursing, and OT. We will try to get him a sensor based home blood sugar monitoring system such as  Freestyle James or Dexcom. Questions were answered and encouragement provided. Return in about 3 months (around 3/28/2022).          Electronically signed by Faith Chung MD on 12/28/2021

## 2021-12-28 NOTE — CARE COORDINATION
ongoing CT follow up. Phone Assessment: Reports Patient doing well. No cough, fever, chills, malaise or acute distress. Reports pharmacy unable to fill Augmentin as written (see yellow box above); CTN to notify PCP. Reports [employer] delivered O2 and supplies. Patient using O2 at 2L cont as directed. Reports RLE free of redness, swelling or pain. Education Provided: Son verbalizes understanding. PNA Zone Mgt  DVT education, care and prevention    AVS/Meds: Confirmed copy of AVS received, reviewed with Son. Confirmed Patient obtained and is taking Lasix, Coumadin and all routine meds as directed as directed. CTN contacting PCP re: Augmentin rx. Med education provided, questions answered, verbalizes understanding. Stressed importance of med compliance. 1111F medication review completed with Son . Advised obtaining 90 day supply of routine, prn meds. Advised contacting pharmacy/md for refill needs. Resource Need Assessment:   Living Arrangements: lives w/ Son   ADLS:requires assist from Son; able to sponge bathe (no longer takes shower), requires stand by assist with ambulation; Son does home mgt, meals etc   DME:[employer], O2 per [employer]  Transportation: family  HHC:none, denies need   Community Assistance:none, denies need   Referrals Made per CTN with Patient/Family Approval: [phone] [facility] Follow Up Appointments: Discussed importance of 7 day hospital follow up appointment for continuity of care. Transportation confirmed for the above appts. Son to schedule appt w/ Dr Stacie Matthew, no assistance needed. Covid19:  Patient received Pfizer Covid 19 vaccine series x 2 and J&J booster. . Reviewed, advised Covid19 preventative measures including mask covering nose/mouth, social distancing, hand washing. Advanced Care Planning: Reports Advanced Directives up to date, reflecting current wishes . Encouraged to place on file w/ PCP, [facility].   Healthcare Decision Maker:    Primary Decision Maker: Wei Peres - Jessica - [phone]    Secondary Decision Maker: Oscar Lopez - [phone]    Advised to contact PCP 24/7 re: any health concerns for early outpatient intervention in an effort to avoid hospitalization. Discussed benefits of WIC, Urgent Care. Advised 911 if noting acute distress. Requested call back if needing further assistance with atb rx, has CTN contact number. Note routed to Dr Krys Lewis as high priority. Plan for next outreach in 1-2 days to confirm atb.     Husam Lynn RN

## 2021-12-29 NOTE — TELEPHONE ENCOUNTER
Left message on son's voicemail yesterday (12/28/21) Patients INR was 3.4 and Dr. Krystian Wan wants him to wait to start his Warfarin until Thursday. Please take 1/2 tablet (2.5mg) daily and will recheck in a few weeks with home care.

## 2021-12-29 NOTE — CARE COORDINATION
Yum[facility] 45 Transitions Follow Up Call    2021    Patient: Angel Luis Leonardo  Patient : 1929   MRN: [id number]  Reason for Admission: ? Pna vs Pl Effusion, Ac Resp Failure, RLE DVT  Discharge Date: 21 RARS: Readmission Risk Score: 17.7 ( )    Care Transitions Subsequent and Final Call    Schedule Follow Up Appointment with PCP: Declined  Subsequent and Final Calls  Have your medications changed?: Yes  Patient Reports: see note  Do you have any questions related to your medications?: No  Do you currently have any active services?: Yes  Are you currently active with any services?: Home Health  Do you have any needs or concerns that I can assist you with?: No  Identified Barriers: Other  Care Transitions Interventions   Home Care Waiver: Declined        Transportation Support: Declined      DME Assistance: Declined     Senior Services: Declined    Other Interventions:         Future Appointments   Date Time Provider Brian Zamora   3/29/2022  1:15 PM Anirudh Tabares MD [facility]     CARE TRANSITION MONITORING    FACILITY: [facility]  RARS: [address] [place] [address] PCP: Dr Jamar Ashby    Per chart review- seen by PCP yesterday w/ the following new orders:  *Augmentin 875mg bid x 1 week  *[facility] for PT/OT/SN--to monitor INR  * Sensor based home blood sugar monitoring system such as  Freestyle James or Dexcom  *Portable O2    Spoke briefly with Son for follow up call. Confirmed Augmentin obtained, Patient taking as directed. Reports [facility] visit scheduled for this afternoon to address portable oxygen needs. Agreeable to [facility]-- glad he will be able to have in home INR monitoring. Advised once established he can call Jose Tomlin  re: any health concerns, needs. Reports Patient doing well, no acute issues. Advised to contact PCP  re: any health concerns for early outpatient intervention in an effort to avoid hospitalization. Plan for next outreach in 5-7 days.  Upon next outreach: confirm completion of atb, confirm [facility], confirm portable O2, confirm Pulm appt  Alondra Patterson RN

## 2022-01-01 ENCOUNTER — APPOINTMENT (OUTPATIENT)
Dept: MRI IMAGING | Age: 87
End: 2022-01-01
Payer: MEDICARE

## 2022-01-01 ENCOUNTER — CARE COORDINATION (OUTPATIENT)
Dept: CASE MANAGEMENT | Age: 87
End: 2022-01-01

## 2022-01-01 ENCOUNTER — TELEPHONE (OUTPATIENT)
Dept: FAMILY MEDICINE CLINIC | Age: 87
End: 2022-01-01

## 2022-01-01 ENCOUNTER — APPOINTMENT (OUTPATIENT)
Dept: GENERAL RADIOLOGY | Age: 87
DRG: 283 | End: 2022-01-01
Payer: MEDICARE

## 2022-01-01 ENCOUNTER — APPOINTMENT (OUTPATIENT)
Dept: NUCLEAR MEDICINE | Age: 87
DRG: 283 | End: 2022-01-01
Payer: MEDICARE

## 2022-01-01 ENCOUNTER — APPOINTMENT (OUTPATIENT)
Dept: CT IMAGING | Age: 87
DRG: 283 | End: 2022-01-01
Payer: MEDICARE

## 2022-01-01 ENCOUNTER — HOSPITAL ENCOUNTER (EMERGENCY)
Age: 87
Discharge: HOME | End: 2022-02-10
Attending: EMERGENCY MEDICINE
Payer: MEDICARE

## 2022-01-01 ENCOUNTER — APPOINTMENT (OUTPATIENT)
Dept: INTERVENTIONAL RADIOLOGY/VASCULAR | Age: 87
DRG: 283 | End: 2022-01-01
Payer: MEDICARE

## 2022-01-01 ENCOUNTER — HOSPITAL ENCOUNTER (INPATIENT)
Age: 87
LOS: 2 days | DRG: 283 | End: 2022-04-14
Attending: EMERGENCY MEDICINE | Admitting: INTERNAL MEDICINE
Payer: MEDICARE

## 2022-01-01 ENCOUNTER — VIRTUAL VISIT (OUTPATIENT)
Dept: FAMILY MEDICINE CLINIC | Age: 87
End: 2022-01-01
Payer: MEDICARE

## 2022-01-01 ENCOUNTER — APPOINTMENT (OUTPATIENT)
Dept: CT IMAGING | Age: 87
End: 2022-01-01
Payer: MEDICARE

## 2022-01-01 VITALS
OXYGEN SATURATION: 51 % | BODY MASS INDEX: 25.31 KG/M2 | TEMPERATURE: 97.5 F | HEART RATE: 52 BPM | SYSTOLIC BLOOD PRESSURE: 98 MMHG | DIASTOLIC BLOOD PRESSURE: 50 MMHG | RESPIRATION RATE: 14 BRPM | HEIGHT: 68 IN | WEIGHT: 167 LBS

## 2022-01-01 VITALS
TEMPERATURE: 98.2 F | RESPIRATION RATE: 18 BRPM | SYSTOLIC BLOOD PRESSURE: 128 MMHG | OXYGEN SATURATION: 99 % | HEART RATE: 73 BPM | DIASTOLIC BLOOD PRESSURE: 75 MMHG

## 2022-01-01 DIAGNOSIS — J90 PLEURAL EFFUSION: ICD-10-CM

## 2022-01-01 DIAGNOSIS — R56.9 SEIZURE-LIKE ACTIVITY (HCC): ICD-10-CM

## 2022-01-01 DIAGNOSIS — I10 HYPERTENSION, UNSPECIFIED TYPE: ICD-10-CM

## 2022-01-01 DIAGNOSIS — Z00.00 ROUTINE GENERAL MEDICAL EXAMINATION AT A HEALTH CARE FACILITY: Primary | ICD-10-CM

## 2022-01-01 DIAGNOSIS — H93.12 TINNITUS OF LEFT EAR: Primary | ICD-10-CM

## 2022-01-01 DIAGNOSIS — H65.92 FLUID LEVEL BEHIND TYMPANIC MEMBRANE OF LEFT EAR: ICD-10-CM

## 2022-01-01 DIAGNOSIS — R55 SYNCOPE AND COLLAPSE: Primary | ICD-10-CM

## 2022-01-01 DIAGNOSIS — R42 DIZZINESS: ICD-10-CM

## 2022-01-01 LAB
ALBUMIN SERPL-MCNC: 3.1 GM/DL (ref 3.4–5)
ALBUMIN SERPL-MCNC: 3.4 GM/DL (ref 3.4–5)
ALP BLD-CCNC: 90 IU/L (ref 40–129)
ALP BLD-CCNC: 90 IU/L (ref 40–129)
ALT SERPL-CCNC: 8 U/L (ref 10–40)
ALT SERPL-CCNC: 8 U/L (ref 10–40)
ANION GAP SERPL CALCULATED.3IONS-SCNC: 10 MMOL/L (ref 4–16)
ANION GAP SERPL CALCULATED.3IONS-SCNC: 12 MMOL/L (ref 4–16)
ANION GAP SERPL CALCULATED.3IONS-SCNC: 18 MMOL/L (ref 4–16)
APTT: 31.4 SECONDS (ref 25.1–37.1)
AST SERPL-CCNC: 13 IU/L (ref 15–37)
AST SERPL-CCNC: 13 IU/L (ref 15–37)
BACTERIA: ABNORMAL /HPF
BASE EXCESS: 10 (ref 0–3.3)
BASOPHILS ABSOLUTE: 0.1 K/CU MM
BASOPHILS ABSOLUTE: 0.1 K/CU MM
BASOPHILS RELATIVE PERCENT: 1.1 % (ref 0–1)
BASOPHILS RELATIVE PERCENT: 1.2 % (ref 0–1)
BILIRUB SERPL-MCNC: 0.1 MG/DL (ref 0–1)
BILIRUB SERPL-MCNC: 0.2 MG/DL (ref 0–1)
BILIRUBIN URINE: NEGATIVE MG/DL
BLOOD, URINE: ABNORMAL
BUN BLDV-MCNC: 19 MG/DL (ref 6–23)
BUN BLDV-MCNC: 23 MG/DL (ref 6–23)
BUN BLDV-MCNC: 29 MG/DL (ref 6–23)
CALCIUM SERPL-MCNC: 8.1 MG/DL (ref 8.3–10.6)
CALCIUM SERPL-MCNC: 8.4 MG/DL (ref 8.3–10.6)
CALCIUM SERPL-MCNC: 8.5 MG/DL (ref 8.3–10.6)
CARBON MONOXIDE, BLOOD: 2.1 % (ref 0–5)
CEA: 1.3 NG/ML
CHLORIDE BLD-SCNC: 100 MMOL/L (ref 99–110)
CHLORIDE BLD-SCNC: 103 MMOL/L (ref 99–110)
CHLORIDE BLD-SCNC: 99 MMOL/L (ref 99–110)
CLARITY: CLEAR
CO2 CONTENT: 18.3 MMOL/L (ref 19–24)
CO2: 15 MMOL/L (ref 21–32)
CO2: 22 MMOL/L (ref 21–32)
CO2: 25 MMOL/L (ref 21–32)
COLOR: YELLOW
COMMENT: ABNORMAL
CREAT SERPL-MCNC: 2 MG/DL (ref 0.9–1.3)
CREAT SERPL-MCNC: 2.1 MG/DL (ref 0.9–1.3)
CREAT SERPL-MCNC: 3.1 MG/DL (ref 0.9–1.3)
D DIMER: 709 NG/ML(DDU)
DIFFERENTIAL TYPE: ABNORMAL
DIFFERENTIAL TYPE: ABNORMAL
EKG ATRIAL RATE: 0 BPM
EKG ATRIAL RATE: 100 BPM
EKG ATRIAL RATE: 65 BPM
EKG DIAGNOSIS: NORMAL
EKG P AXIS: 116 DEGREES
EKG Q-T INTERVAL: 382 MS
EKG Q-T INTERVAL: 398 MS
EKG Q-T INTERVAL: 454 MS
EKG QRS DURATION: 88 MS
EKG QRS DURATION: 88 MS
EKG QRS DURATION: 96 MS
EKG QTC CALCULATION (BAZETT): 413 MS
EKG QTC CALCULATION (BAZETT): 424 MS
EKG QTC CALCULATION (BAZETT): 426 MS
EKG R AXIS: -6 DEGREES
EKG R AXIS: 2 DEGREES
EKG R AXIS: 9 DEGREES
EKG T AXIS: -1 DEGREES
EKG T AXIS: 1 DEGREES
EKG T AXIS: 56 DEGREES
EKG VENTRICULAR RATE: 53 BPM
EKG VENTRICULAR RATE: 65 BPM
EKG VENTRICULAR RATE: 74 BPM
EOSINOPHILS ABSOLUTE: 0.4 K/CU MM
EOSINOPHILS ABSOLUTE: 0.6 K/CU MM
EOSINOPHILS RELATIVE PERCENT: 6.4 % (ref 0–3)
EOSINOPHILS RELATIVE PERCENT: 8.7 % (ref 0–3)
ESTIMATED AVERAGE GLUCOSE: 171 MG/DL
FLUID TYPE: NORMAL INDEX
GFR AFRICAN AMERICAN: 23 ML/MIN/1.73M2
GFR AFRICAN AMERICAN: 36 ML/MIN/1.73M2
GFR AFRICAN AMERICAN: 38 ML/MIN/1.73M2
GFR NON-AFRICAN AMERICAN: 19 ML/MIN/1.73M2
GFR NON-AFRICAN AMERICAN: 30 ML/MIN/1.73M2
GFR NON-AFRICAN AMERICAN: 31 ML/MIN/1.73M2
GLUCOSE BLD-MCNC: 141 MG/DL (ref 70–99)
GLUCOSE BLD-MCNC: 182 MG/DL (ref 70–99)
GLUCOSE BLD-MCNC: 185 MG/DL (ref 70–99)
GLUCOSE BLD-MCNC: 216 MG/DL (ref 70–99)
GLUCOSE BLD-MCNC: 217 MG/DL (ref 70–99)
GLUCOSE BLD-MCNC: 217 MG/DL (ref 70–99)
GLUCOSE BLD-MCNC: 220 MG/DL (ref 70–99)
GLUCOSE BLD-MCNC: 257 MG/DL (ref 70–99)
GLUCOSE BLD-MCNC: 257 MG/DL (ref 70–99)
GLUCOSE BLD-MCNC: 276 MG/DL (ref 70–99)
GLUCOSE, FLUID: 228 MG/DL
GLUCOSE, URINE: >1000 MG/DL
GRANULAR CASTS: 4 /LPF
HBA1C MFR BLD: 7.6 % (ref 4.2–6.3)
HBV SURFACE AB TITR SER: 10.53 {TITER}
HCO3 ARTERIAL: 17.1 MMOL/L (ref 18–23)
HCT VFR BLD CALC: 30.4 % (ref 42–52)
HCT VFR BLD CALC: 33.7 % (ref 42–52)
HEMOGLOBIN: 10.4 GM/DL (ref 13.5–18)
HEMOGLOBIN: 9.1 GM/DL (ref 13.5–18)
HEPATITIS B SURFACE ANTIGEN: NON REACTIVE
HIGH SENSITIVE C-REACTIVE PROTEIN: 30.5 MG/L
HYALINE CASTS: 2 /LPF
IMMATURE NEUTROPHIL %: 0.3 % (ref 0–0.43)
IMMATURE NEUTROPHIL %: 0.5 % (ref 0–0.43)
INR BLD: 1.64 INDEX
INR BLD: 1.7 INDEX
INR BLD: 2.61 INDEX
KETONES, URINE: NEGATIVE MG/DL
LACTATE DEHYDROGENASE, FLUID: 85 IU/L
LACTATE: 1.2 MMOL/L (ref 0.4–2)
LEUKOCYTE ESTERASE, URINE: NEGATIVE
LYMPHOCYTES ABSOLUTE: 1.9 K/CU MM
LYMPHOCYTES ABSOLUTE: 2.3 K/CU MM
LYMPHOCYTES RELATIVE PERCENT: 31 % (ref 24–44)
LYMPHOCYTES RELATIVE PERCENT: 31.7 % (ref 24–44)
LYMPHOCYTES, BODY FLUID: 63 %
MCH RBC QN AUTO: 28.7 PG (ref 27–31)
MCH RBC QN AUTO: 29.2 PG (ref 27–31)
MCHC RBC AUTO-ENTMCNC: 29.9 % (ref 32–36)
MCHC RBC AUTO-ENTMCNC: 30.9 % (ref 32–36)
MCV RBC AUTO: 92.8 FL (ref 78–100)
MCV RBC AUTO: 97.4 FL (ref 78–100)
MESOTHELIAL FLUID: 23 /100 WBC
METHEMOGLOBIN ARTERIAL: 1.6 %
MONOCYTE, FLUID: NORMAL %
MONOCYTES ABSOLUTE: 0.4 K/CU MM
MONOCYTES ABSOLUTE: 0.5 K/CU MM
MONOCYTES RELATIVE PERCENT: 6.1 % (ref 0–4)
MONOCYTES RELATIVE PERCENT: 6.9 % (ref 0–4)
MUCUS: ABNORMAL HPF
NEUTROPHIL, FLUID: 1 %
NITRITE URINE, QUANTITATIVE: NEGATIVE
NUCLEATED RBC %: 0 %
NUCLEATED RBC %: 0 %
O2 SATURATION: 94.7 % (ref 96–97)
OTHER CELLS FLUID: NORMAL
PCO2 ARTERIAL: 39 MMHG (ref 32–45)
PDW BLD-RTO: 13.8 % (ref 11.7–14.9)
PDW BLD-RTO: 14.9 % (ref 11.7–14.9)
PH BLOOD: 7.25 (ref 7.34–7.45)
PH, URINE: 6 (ref 5–8)
PLATELET # BLD: 150 K/CU MM (ref 140–440)
PLATELET # BLD: 229 K/CU MM (ref 140–440)
PMV BLD AUTO: 11 FL (ref 7.5–11.1)
PMV BLD AUTO: 9.4 FL (ref 7.5–11.1)
PO2 ARTERIAL: 85 MMHG (ref 75–100)
POTASSIUM SERPL-SCNC: 4.2 MMOL/L (ref 3.5–5.1)
POTASSIUM SERPL-SCNC: 4.7 MMOL/L (ref 3.5–5.1)
POTASSIUM SERPL-SCNC: 5.7 MMOL/L (ref 3.5–5.1)
PROCALCITONIN: 0.07
PROCALCITONIN: 0.36
PROTEIN FLUID: 4.3 G/DL
PROTEIN UA: 30 MG/DL
PROTHROMBIN TIME: 21.3 SECONDS (ref 11.7–14.5)
PROTHROMBIN TIME: 22.1 SECONDS (ref 11.7–14.5)
PROTHROMBIN TIME: 34 SECONDS (ref 11.7–14.5)
RBC # BLD: 3.12 M/CU MM (ref 4.6–6.2)
RBC # BLD: 3.63 M/CU MM (ref 4.6–6.2)
RBC FLUID: 80 /CU MM
RBC URINE: <1 /HPF (ref 0–3)
SEGMENTED NEUTROPHILS ABSOLUTE COUNT: 3.3 K/CU MM
SEGMENTED NEUTROPHILS ABSOLUTE COUNT: 3.8 K/CU MM
SEGMENTED NEUTROPHILS RELATIVE PERCENT: 52 % (ref 36–66)
SEGMENTED NEUTROPHILS RELATIVE PERCENT: 54.1 % (ref 36–66)
SODIUM BLD-SCNC: 133 MMOL/L (ref 135–145)
SODIUM BLD-SCNC: 135 MMOL/L (ref 135–145)
SODIUM BLD-SCNC: 136 MMOL/L (ref 135–145)
SPECIFIC GRAVITY UA: 1.01 (ref 1–1.03)
SQUAMOUS EPITHELIAL: <1 /HPF
TOTAL IMMATURE NEUTOROPHIL: 0.02 K/CU MM
TOTAL IMMATURE NEUTOROPHIL: 0.03 K/CU MM
TOTAL NUCLEATED RBC: 0 K/CU MM
TOTAL NUCLEATED RBC: 0 K/CU MM
TOTAL PROTEIN: 7.1 GM/DL (ref 6.4–8.2)
TOTAL PROTEIN: 7.1 GM/DL (ref 6.4–8.2)
TROPONIN T: 0.02 NG/ML
TROPONIN T: 0.14 NG/ML
TROPONIN T: 0.17 NG/ML
TROPONIN T: 1.81 NG/ML
TROPONIN T: 2.09 NG/ML
UROBILINOGEN, URINE: NORMAL MG/DL (ref 0.2–1)
WBC # BLD: 6.1 K/CU MM (ref 4–10.5)
WBC # BLD: 7.4 K/CU MM (ref 4–10.5)
WBC FLUID: 630 /CU MM
WBC UA: ABNORMAL /HPF (ref 0–2)

## 2022-01-01 PROCEDURE — 84145 PROCALCITONIN (PCT): CPT

## 2022-01-01 PROCEDURE — 70450 CT HEAD/BRAIN W/O DYE: CPT

## 2022-01-01 PROCEDURE — 93010 ELECTROCARDIOGRAM REPORT: CPT | Performed by: INTERNAL MEDICINE

## 2022-01-01 PROCEDURE — A9540 TC99M MAA: HCPCS | Performed by: INTERNAL MEDICINE

## 2022-01-01 PROCEDURE — G0438 PPPS, INITIAL VISIT: HCPCS | Performed by: FAMILY MEDICINE

## 2022-01-01 PROCEDURE — 85730 THROMBOPLASTIN TIME PARTIAL: CPT

## 2022-01-01 PROCEDURE — 99222 1ST HOSP IP/OBS MODERATE 55: CPT | Performed by: INTERNAL MEDICINE

## 2022-01-01 PROCEDURE — 3430000000 HC RX DIAGNOSTIC RADIOPHARMACEUTICAL: Performed by: INTERNAL MEDICINE

## 2022-01-01 PROCEDURE — A9539 TC99M PENTETATE: HCPCS | Performed by: INTERNAL MEDICINE

## 2022-01-01 PROCEDURE — 82105 ALPHA-FETOPROTEIN SERUM: CPT

## 2022-01-01 PROCEDURE — 85025 COMPLETE CBC W/AUTO DIFF WBC: CPT

## 2022-01-01 PROCEDURE — 84157 ASSAY OF PROTEIN OTHER: CPT

## 2022-01-01 PROCEDURE — 71045 X-RAY EXAM CHEST 1 VIEW: CPT

## 2022-01-01 PROCEDURE — 2000000000 HC ICU R&B

## 2022-01-01 PROCEDURE — 2709999900 HC NON-CHARGEABLE SUPPLY

## 2022-01-01 PROCEDURE — 82962 GLUCOSE BLOOD TEST: CPT

## 2022-01-01 PROCEDURE — 6360000002 HC RX W HCPCS: Performed by: INTERNAL MEDICINE

## 2022-01-01 PROCEDURE — 86141 C-REACTIVE PROTEIN HS: CPT

## 2022-01-01 PROCEDURE — C1729 CATH, DRAINAGE: HCPCS

## 2022-01-01 PROCEDURE — 84484 ASSAY OF TROPONIN QUANT: CPT

## 2022-01-01 PROCEDURE — 6370000000 HC RX 637 (ALT 250 FOR IP): Performed by: INTERNAL MEDICINE

## 2022-01-01 PROCEDURE — 51798 US URINE CAPACITY MEASURE: CPT

## 2022-01-01 PROCEDURE — 2580000003 HC RX 258: Performed by: INTERNAL MEDICINE

## 2022-01-01 PROCEDURE — 88108 CYTOPATH CONCENTRATE TECH: CPT | Performed by: PATHOLOGY

## 2022-01-01 PROCEDURE — 80053 COMPREHEN METABOLIC PANEL: CPT

## 2022-01-01 PROCEDURE — 99223 1ST HOSP IP/OBS HIGH 75: CPT | Performed by: INTERNAL MEDICINE

## 2022-01-01 PROCEDURE — 6360000002 HC RX W HCPCS: Performed by: STUDENT IN AN ORGANIZED HEALTH CARE EDUCATION/TRAINING PROGRAM

## 2022-01-01 PROCEDURE — 82945 GLUCOSE OTHER FLUID: CPT

## 2022-01-01 PROCEDURE — 88305 TISSUE EXAM BY PATHOLOGIST: CPT | Performed by: PATHOLOGY

## 2022-01-01 PROCEDURE — 93005 ELECTROCARDIOGRAM TRACING: CPT | Performed by: EMERGENCY MEDICINE

## 2022-01-01 PROCEDURE — 80048 BASIC METABOLIC PNL TOTAL CA: CPT

## 2022-01-01 PROCEDURE — 0W9B3ZZ DRAINAGE OF LEFT PLEURAL CAVITY, PERCUTANEOUS APPROACH: ICD-10-PCS | Performed by: RADIOLOGY

## 2022-01-01 PROCEDURE — 85610 PROTHROMBIN TIME: CPT

## 2022-01-01 PROCEDURE — 99284 EMERGENCY DEPT VISIT MOD MDM: CPT

## 2022-01-01 PROCEDURE — 76937 US GUIDE VASCULAR ACCESS: CPT

## 2022-01-01 PROCEDURE — APPNB60 APP NON BILLABLE TIME 46-60 MINS: Performed by: NURSE PRACTITIONER

## 2022-01-01 PROCEDURE — 2500000003 HC RX 250 WO HCPCS: Performed by: NURSE PRACTITIONER

## 2022-01-01 PROCEDURE — 83036 HEMOGLOBIN GLYCOSYLATED A1C: CPT

## 2022-01-01 PROCEDURE — 32555 ASPIRATE PLEURA W/ IMAGING: CPT

## 2022-01-01 PROCEDURE — 94761 N-INVAS EAR/PLS OXIMETRY MLT: CPT

## 2022-01-01 PROCEDURE — 2580000003 HC RX 258: Performed by: EMERGENCY MEDICINE

## 2022-01-01 PROCEDURE — 78582 LUNG VENTILAT&PERFUS IMAGING: CPT

## 2022-01-01 PROCEDURE — 36569 INSJ PICC 5 YR+ W/O IMAGING: CPT

## 2022-01-01 PROCEDURE — 89051 BODY FLUID CELL COUNT: CPT

## 2022-01-01 PROCEDURE — 81001 URINALYSIS AUTO W/SCOPE: CPT

## 2022-01-01 PROCEDURE — 70547 MR ANGIOGRAPHY NECK W/O DYE: CPT

## 2022-01-01 PROCEDURE — 2500000003 HC RX 250 WO HCPCS: Performed by: INTERNAL MEDICINE

## 2022-01-01 PROCEDURE — 36415 COLL VENOUS BLD VENIPUNCTURE: CPT

## 2022-01-01 PROCEDURE — 87081 CULTURE SCREEN ONLY: CPT

## 2022-01-01 PROCEDURE — 82803 BLOOD GASES ANY COMBINATION: CPT

## 2022-01-01 PROCEDURE — P9047 ALBUMIN (HUMAN), 25%, 50ML: HCPCS | Performed by: INTERNAL MEDICINE

## 2022-01-01 PROCEDURE — 83605 ASSAY OF LACTIC ACID: CPT

## 2022-01-01 PROCEDURE — 86706 HEP B SURFACE ANTIBODY: CPT

## 2022-01-01 PROCEDURE — 6360000004 HC RX CONTRAST MEDICATION: Performed by: EMERGENCY MEDICINE

## 2022-01-01 PROCEDURE — 99233 SBSQ HOSP IP/OBS HIGH 50: CPT | Performed by: INTERNAL MEDICINE

## 2022-01-01 PROCEDURE — 93308 TTE F-UP OR LMTD: CPT

## 2022-01-01 PROCEDURE — 2580000003 HC RX 258: Performed by: NURSE PRACTITIONER

## 2022-01-01 PROCEDURE — 99232 SBSQ HOSP IP/OBS MODERATE 35: CPT | Performed by: INTERNAL MEDICINE

## 2022-01-01 PROCEDURE — 2700000000 HC OXYGEN THERAPY PER DAY

## 2022-01-01 PROCEDURE — 82378 CARCINOEMBRYONIC ANTIGEN: CPT

## 2022-01-01 PROCEDURE — 87340 HEPATITIS B SURFACE AG IA: CPT

## 2022-01-01 PROCEDURE — 70544 MR ANGIOGRAPHY HEAD W/O DYE: CPT

## 2022-01-01 PROCEDURE — 71275 CT ANGIOGRAPHY CHEST: CPT

## 2022-01-01 PROCEDURE — 83880 ASSAY OF NATRIURETIC PEPTIDE: CPT

## 2022-01-01 PROCEDURE — 85379 FIBRIN DEGRADATION QUANT: CPT

## 2022-01-01 PROCEDURE — 93005 ELECTROCARDIOGRAM TRACING: CPT | Performed by: INTERNAL MEDICINE

## 2022-01-01 PROCEDURE — 1123F ACP DISCUSS/DSCN MKR DOCD: CPT | Performed by: FAMILY MEDICINE

## 2022-01-01 PROCEDURE — C1751 CATH, INF, PER/CENT/MIDLINE: HCPCS

## 2022-01-01 PROCEDURE — 4040F PNEUMOC VAC/ADMIN/RCVD: CPT | Performed by: FAMILY MEDICINE

## 2022-01-01 PROCEDURE — 86301 IMMUNOASSAY TUMOR CA 19-9: CPT

## 2022-01-01 PROCEDURE — 87040 BLOOD CULTURE FOR BACTERIA: CPT

## 2022-01-01 PROCEDURE — 83615 LACTATE (LD) (LDH) ENZYME: CPT

## 2022-01-01 RX ORDER — PROMETHAZINE HYDROCHLORIDE 25 MG/ML
6.25 INJECTION, SOLUTION INTRAMUSCULAR; INTRAVENOUS EVERY 6 HOURS PRN
Status: DISCONTINUED | OUTPATIENT
Start: 2022-01-01 | End: 2022-01-01 | Stop reason: HOSPADM

## 2022-01-01 RX ORDER — SODIUM CHLORIDE 9 MG/ML
INJECTION, SOLUTION INTRAVENOUS CONTINUOUS
Status: DISCONTINUED | OUTPATIENT
Start: 2022-01-01 | End: 2022-01-01

## 2022-01-01 RX ORDER — LIDOCAINE HYDROCHLORIDE 10 MG/ML
5 INJECTION, SOLUTION EPIDURAL; INFILTRATION; INTRACAUDAL; PERINEURAL ONCE
Status: DISCONTINUED | OUTPATIENT
Start: 2022-01-01 | End: 2022-01-01 | Stop reason: HOSPADM

## 2022-01-01 RX ORDER — NOREPINEPHRINE BIT/0.9 % NACL 16MG/250ML
1-100 INFUSION BOTTLE (ML) INTRAVENOUS CONTINUOUS
Status: DISCONTINUED | OUTPATIENT
Start: 2022-01-01 | End: 2022-01-01

## 2022-01-01 RX ORDER — DEXTROSE MONOHYDRATE 50 MG/ML
100 INJECTION, SOLUTION INTRAVENOUS PRN
Status: DISCONTINUED | OUTPATIENT
Start: 2022-01-01 | End: 2022-01-01 | Stop reason: HOSPADM

## 2022-01-01 RX ORDER — 0.9 % SODIUM CHLORIDE 0.9 %
500 INTRAVENOUS SOLUTION INTRAVENOUS ONCE
Status: COMPLETED | OUTPATIENT
Start: 2022-01-01 | End: 2022-01-01

## 2022-01-01 RX ORDER — SODIUM CHLORIDE 0.9 % (FLUSH) 0.9 %
5-40 SYRINGE (ML) INJECTION PRN
Status: DISCONTINUED | OUTPATIENT
Start: 2022-01-01 | End: 2022-01-01 | Stop reason: HOSPADM

## 2022-01-01 RX ORDER — OXYCODONE AND ACETAMINOPHEN 7.5; 325 MG/1; MG/1
1 TABLET ORAL 2 TIMES DAILY PRN
Status: DISCONTINUED | OUTPATIENT
Start: 2022-01-01 | End: 2022-01-01 | Stop reason: HOSPADM

## 2022-01-01 RX ORDER — 0.9 % SODIUM CHLORIDE 0.9 %
1000 INTRAVENOUS SOLUTION INTRAVENOUS ONCE
Status: COMPLETED | OUTPATIENT
Start: 2022-01-01 | End: 2022-01-01

## 2022-01-01 RX ORDER — LORAZEPAM 2 MG/ML
1 INJECTION INTRAMUSCULAR EVERY 6 HOURS PRN
Status: DISCONTINUED | OUTPATIENT
Start: 2022-01-01 | End: 2022-01-01 | Stop reason: HOSPADM

## 2022-01-01 RX ORDER — MIDODRINE HYDROCHLORIDE 5 MG/1
10 TABLET ORAL
Status: DISCONTINUED | OUTPATIENT
Start: 2022-01-01 | End: 2022-01-01 | Stop reason: HOSPADM

## 2022-01-01 RX ORDER — SODIUM CHLORIDE 0.9 % (FLUSH) 0.9 %
5-40 SYRINGE (ML) INJECTION EVERY 12 HOURS SCHEDULED
Status: DISCONTINUED | OUTPATIENT
Start: 2022-01-01 | End: 2022-01-01 | Stop reason: HOSPADM

## 2022-01-01 RX ORDER — SODIUM CHLORIDE 9 MG/ML
INJECTION, SOLUTION INTRAVENOUS PRN
Status: DISCONTINUED | OUTPATIENT
Start: 2022-01-01 | End: 2022-01-01 | Stop reason: HOSPADM

## 2022-01-01 RX ORDER — WARFARIN SODIUM 2.5 MG/1
2.5 TABLET ORAL DAILY
Status: DISCONTINUED | OUTPATIENT
Start: 2022-01-01 | End: 2022-01-01

## 2022-01-01 RX ORDER — KIT FOR THE PREPARATION OF TECHNETIUM TC 99M PENTETATE 20 MG/1
3 INJECTION, POWDER, LYOPHILIZED, FOR SOLUTION INTRAVENOUS; RESPIRATORY (INHALATION)
Status: COMPLETED | OUTPATIENT
Start: 2022-01-01 | End: 2022-01-01

## 2022-01-01 RX ORDER — GLYCOPYRROLATE 1 MG/5 ML
0.2 SYRINGE (ML) INTRAVENOUS EVERY 4 HOURS PRN
Status: DISCONTINUED | OUTPATIENT
Start: 2022-01-01 | End: 2022-01-01 | Stop reason: HOSPADM

## 2022-01-01 RX ORDER — DEXTROSE MONOHYDRATE 25 G/50ML
12.5 INJECTION, SOLUTION INTRAVENOUS PRN
Status: DISCONTINUED | OUTPATIENT
Start: 2022-01-01 | End: 2022-01-01

## 2022-01-01 RX ORDER — ONDANSETRON 4 MG/1
4 TABLET, ORALLY DISINTEGRATING ORAL EVERY 8 HOURS PRN
Status: DISCONTINUED | OUTPATIENT
Start: 2022-01-01 | End: 2022-01-01 | Stop reason: HOSPADM

## 2022-01-01 RX ORDER — WARFARIN SODIUM 1 MG/1
0.5 TABLET ORAL DAILY
Status: DISCONTINUED | OUTPATIENT
Start: 2022-01-01 | End: 2022-01-01 | Stop reason: HOSPADM

## 2022-01-01 RX ORDER — ALBUMIN (HUMAN) 12.5 G/50ML
25 SOLUTION INTRAVENOUS ONCE
Status: COMPLETED | OUTPATIENT
Start: 2022-01-01 | End: 2022-01-01

## 2022-01-01 RX ORDER — MORPHINE SULFATE 2 MG/ML
2 INJECTION, SOLUTION INTRAMUSCULAR; INTRAVENOUS
Status: DISCONTINUED | OUTPATIENT
Start: 2022-01-01 | End: 2022-01-01 | Stop reason: HOSPADM

## 2022-01-01 RX ORDER — SODIUM POLYSTYRENE SULFONATE 15 G/60ML
15 SUSPENSION ORAL; RECTAL ONCE
Status: COMPLETED | OUTPATIENT
Start: 2022-01-01 | End: 2022-01-01

## 2022-01-01 RX ORDER — GABAPENTIN 300 MG/1
300 CAPSULE ORAL 2 TIMES DAILY
Status: DISCONTINUED | OUTPATIENT
Start: 2022-01-01 | End: 2022-01-01 | Stop reason: HOSPADM

## 2022-01-01 RX ORDER — NICOTINE POLACRILEX 4 MG
15 LOZENGE BUCCAL PRN
Status: DISCONTINUED | OUTPATIENT
Start: 2022-01-01 | End: 2022-01-01 | Stop reason: HOSPADM

## 2022-01-01 RX ORDER — MORPHINE SULFATE 4 MG/ML
4 INJECTION, SOLUTION INTRAMUSCULAR; INTRAVENOUS
Status: DISCONTINUED | OUTPATIENT
Start: 2022-01-01 | End: 2022-01-01 | Stop reason: HOSPADM

## 2022-01-01 RX ORDER — BUMETANIDE 0.25 MG/ML
2 INJECTION, SOLUTION INTRAMUSCULAR; INTRAVENOUS EVERY 8 HOURS
Status: DISCONTINUED | OUTPATIENT
Start: 2022-01-01 | End: 2022-01-01 | Stop reason: HOSPADM

## 2022-01-01 RX ORDER — WARFARIN SODIUM 1 MG/1
1 TABLET ORAL DAILY
Status: DISCONTINUED | OUTPATIENT
Start: 2022-01-01 | End: 2022-01-01

## 2022-01-01 RX ORDER — FUROSEMIDE 20 MG/1
40 TABLET ORAL EVERY MORNING
Qty: 90 TABLET | Refills: 1 | Status: SHIPPED | OUTPATIENT
Start: 2022-01-01

## 2022-01-01 RX ORDER — ONDANSETRON 2 MG/ML
4 INJECTION INTRAMUSCULAR; INTRAVENOUS EVERY 6 HOURS PRN
Status: DISCONTINUED | OUTPATIENT
Start: 2022-01-01 | End: 2022-01-01 | Stop reason: HOSPADM

## 2022-01-01 RX ORDER — ACETAMINOPHEN 650 MG/1
650 SUPPOSITORY RECTAL EVERY 6 HOURS PRN
Status: DISCONTINUED | OUTPATIENT
Start: 2022-01-01 | End: 2022-01-01 | Stop reason: HOSPADM

## 2022-01-01 RX ORDER — POLYETHYLENE GLYCOL 3350 17 G/17G
17 POWDER, FOR SOLUTION ORAL DAILY PRN
Status: DISCONTINUED | OUTPATIENT
Start: 2022-01-01 | End: 2022-01-01 | Stop reason: HOSPADM

## 2022-01-01 RX ORDER — WARFARIN SODIUM 3 MG/1
3 TABLET ORAL
Status: COMPLETED | OUTPATIENT
Start: 2022-01-01 | End: 2022-01-01

## 2022-01-01 RX ORDER — AMOXICILLIN AND CLAVULANATE POTASSIUM 875; 125 MG/1; MG/1
1 TABLET, FILM COATED ORAL 2 TIMES DAILY
Qty: 14 TABLET | Refills: 0 | Status: SHIPPED | OUTPATIENT
Start: 2022-01-01 | End: 2022-01-01

## 2022-01-01 RX ORDER — NOREPINEPHRINE BIT/0.9 % NACL 16MG/250ML
1-100 INFUSION BOTTLE (ML) INTRAVENOUS CONTINUOUS
Status: DISCONTINUED | OUTPATIENT
Start: 2022-01-01 | End: 2022-01-01 | Stop reason: HOSPADM

## 2022-01-01 RX ORDER — BUMETANIDE 0.25 MG/ML
4 INJECTION, SOLUTION INTRAMUSCULAR; INTRAVENOUS ONCE
Status: COMPLETED | OUTPATIENT
Start: 2022-01-01 | End: 2022-01-01

## 2022-01-01 RX ORDER — ACETAMINOPHEN 325 MG/1
650 TABLET ORAL EVERY 6 HOURS PRN
Status: DISCONTINUED | OUTPATIENT
Start: 2022-01-01 | End: 2022-01-01 | Stop reason: HOSPADM

## 2022-01-01 RX ADMIN — HYDROMORPHONE HYDROCHLORIDE 0.25 MG: 1 INJECTION, SOLUTION INTRAMUSCULAR; INTRAVENOUS; SUBCUTANEOUS at 01:51

## 2022-01-01 RX ADMIN — SODIUM CHLORIDE 500 ML: 9 INJECTION, SOLUTION INTRAVENOUS at 12:21

## 2022-01-01 RX ADMIN — SODIUM CHLORIDE 500 ML: 9 INJECTION, SOLUTION INTRAVENOUS at 04:00

## 2022-01-01 RX ADMIN — BUMETANIDE 2 MG: 0.25 INJECTION INTRAMUSCULAR; INTRAVENOUS at 12:52

## 2022-01-01 RX ADMIN — SODIUM POLYSTYRENE SULFONATE 15 G: 15 SUSPENSION ORAL; RECTAL at 10:37

## 2022-01-01 RX ADMIN — SODIUM BICARBONATE 50 MEQ: 84 INJECTION, SOLUTION INTRAVENOUS at 13:53

## 2022-01-01 RX ADMIN — SODIUM CHLORIDE: 9 INJECTION, SOLUTION INTRAVENOUS at 22:38

## 2022-01-01 RX ADMIN — SODIUM CHLORIDE, PRESERVATIVE FREE 10 ML: 5 INJECTION INTRAVENOUS at 20:56

## 2022-01-01 RX ADMIN — ONDANSETRON 4 MG: 2 INJECTION INTRAMUSCULAR; INTRAVENOUS at 07:31

## 2022-01-01 RX ADMIN — Medication 6 MILLICURIE: at 18:16

## 2022-01-01 RX ADMIN — INSULIN LISPRO 6 UNITS: 100 INJECTION, SOLUTION INTRAVENOUS; SUBCUTANEOUS at 13:20

## 2022-01-01 RX ADMIN — WARFARIN SODIUM 3 MG: 3 TABLET ORAL at 18:57

## 2022-01-01 RX ADMIN — INSULIN LISPRO 4 UNITS: 100 INJECTION, SOLUTION INTRAVENOUS; SUBCUTANEOUS at 10:35

## 2022-01-01 RX ADMIN — AZITHROMYCIN MONOHYDRATE 500 MG: 500 INJECTION, POWDER, LYOPHILIZED, FOR SOLUTION INTRAVENOUS at 06:01

## 2022-01-01 RX ADMIN — INSULIN LISPRO 2 UNITS: 100 INJECTION, SOLUTION INTRAVENOUS; SUBCUTANEOUS at 18:54

## 2022-01-01 RX ADMIN — INSULIN LISPRO 4 UNITS: 100 INJECTION, SOLUTION INTRAVENOUS; SUBCUTANEOUS at 18:31

## 2022-01-01 RX ADMIN — MIDODRINE HYDROCHLORIDE 10 MG: 5 TABLET ORAL at 15:01

## 2022-01-01 RX ADMIN — ENOXAPARIN SODIUM 80 MG: 80 INJECTION SUBCUTANEOUS at 22:40

## 2022-01-01 RX ADMIN — ALBUMIN (HUMAN) 25 G: 0.25 INJECTION, SOLUTION INTRAVENOUS at 22:49

## 2022-01-01 RX ADMIN — INSULIN LISPRO 4 UNITS: 100 INJECTION, SOLUTION INTRAVENOUS; SUBCUTANEOUS at 14:28

## 2022-01-01 RX ADMIN — CEFTRIAXONE 1000 MG: 1 INJECTION, POWDER, FOR SOLUTION INTRAMUSCULAR; INTRAVENOUS at 06:04

## 2022-01-01 RX ADMIN — PROMETHAZINE HYDROCHLORIDE 6.25 MG: 25 INJECTION INTRAMUSCULAR; INTRAVENOUS at 13:10

## 2022-01-01 RX ADMIN — THEOPHYLLINE ANHYDROUS 100 MG: 100 CAPSULE, EXTENDED RELEASE ORAL at 09:22

## 2022-01-01 RX ADMIN — AZITHROMYCIN MONOHYDRATE 500 MG: 500 INJECTION, POWDER, LYOPHILIZED, FOR SOLUTION INTRAVENOUS at 05:19

## 2022-01-01 RX ADMIN — BUMETANIDE 2 MG: 0.25 INJECTION INTRAMUSCULAR; INTRAVENOUS at 06:26

## 2022-01-01 RX ADMIN — ENOXAPARIN SODIUM 30 MG: 100 INJECTION SUBCUTANEOUS at 09:23

## 2022-01-01 RX ADMIN — HYDROMORPHONE HYDROCHLORIDE 0.25 MG: 1 INJECTION, SOLUTION INTRAMUSCULAR; INTRAVENOUS; SUBCUTANEOUS at 18:22

## 2022-01-01 RX ADMIN — ONDANSETRON 4 MG: 2 INJECTION INTRAMUSCULAR; INTRAVENOUS at 09:36

## 2022-01-01 RX ADMIN — GABAPENTIN 300 MG: 300 CAPSULE ORAL at 15:02

## 2022-01-01 RX ADMIN — SODIUM CHLORIDE: 9 INJECTION, SOLUTION INTRAVENOUS at 10:30

## 2022-01-01 RX ADMIN — CEFTRIAXONE 1000 MG: 1 INJECTION, POWDER, FOR SOLUTION INTRAMUSCULAR; INTRAVENOUS at 03:59

## 2022-01-01 RX ADMIN — LORAZEPAM 1 MG: 2 INJECTION INTRAMUSCULAR; INTRAVENOUS at 02:49

## 2022-01-01 RX ADMIN — Medication 8 MCG/MIN: at 05:19

## 2022-01-01 RX ADMIN — SODIUM CHLORIDE, PRESERVATIVE FREE 10 ML: 5 INJECTION INTRAVENOUS at 20:37

## 2022-01-01 RX ADMIN — BUMETANIDE 4 MG: 0.25 INJECTION INTRAMUSCULAR; INTRAVENOUS at 22:41

## 2022-01-01 RX ADMIN — SODIUM CHLORIDE: 9 INJECTION, SOLUTION INTRAVENOUS at 05:17

## 2022-01-01 RX ADMIN — SODIUM CHLORIDE, PRESERVATIVE FREE 10 ML: 5 INJECTION INTRAVENOUS at 07:31

## 2022-01-01 RX ADMIN — IOPAMIDOL 75 ML: 755 INJECTION, SOLUTION INTRAVENOUS at 23:32

## 2022-01-01 RX ADMIN — HYDROMORPHONE HYDROCHLORIDE 0.25 MG: 1 INJECTION, SOLUTION INTRAMUSCULAR; INTRAVENOUS; SUBCUTANEOUS at 21:44

## 2022-01-01 RX ADMIN — KIT FOR THE PREPARATION OF TECHNETIUM TC 99M PENTETATE 3 MILLICURIE: 20 INJECTION, POWDER, LYOPHILIZED, FOR SOLUTION INTRAVENOUS; RESPIRATORY (INHALATION) at 18:16

## 2022-01-01 RX ADMIN — BUMETANIDE 2 MG: 0.25 INJECTION INTRAMUSCULAR; INTRAVENOUS at 20:55

## 2022-01-01 RX ADMIN — Medication 5 MCG/MIN: at 13:55

## 2022-01-01 RX ADMIN — MORPHINE SULFATE 2 MG: 2 INJECTION, SOLUTION INTRAMUSCULAR; INTRAVENOUS at 02:49

## 2022-01-01 RX ADMIN — SODIUM CHLORIDE 1000 ML: 9 INJECTION, SOLUTION INTRAVENOUS at 21:13

## 2022-01-01 ASSESSMENT — ENCOUNTER SYMPTOMS
NAUSEA: 0
PHOTOPHOBIA: 0
WHEEZING: 0
CONSTIPATION: 0
ABDOMINAL PAIN: 0
SHORTNESS OF BREATH: 1
SHORTNESS OF BREATH: 0
GASTROINTESTINAL NEGATIVE: 1
DIARRHEA: 0
COUGH: 0
EYES NEGATIVE: 1
VOMITING: 0
COLOR CHANGE: 0
EYE PAIN: 0
CHEST TIGHTNESS: 0
BACK PAIN: 0
BLOOD IN STOOL: 0
ALLERGIC/IMMUNOLOGIC NEGATIVE: 1

## 2022-01-01 ASSESSMENT — PAIN SCALES - GENERAL
PAINLEVEL_OUTOF10: 0
PAINLEVEL_OUTOF10: 4
PAINLEVEL_OUTOF10: 5
PAINLEVEL_OUTOF10: 0
PAINLEVEL_OUTOF10: 2
PAINLEVEL_OUTOF10: 0
PAINLEVEL_OUTOF10: 5
PAINLEVEL_OUTOF10: 0
PAINLEVEL_OUTOF10: 0
PAINLEVEL_OUTOF10: 3
PAINLEVEL_OUTOF10: 0
PAINLEVEL_OUTOF10: 6
PAINLEVEL_OUTOF10: 0
PAINLEVEL_OUTOF10: 5
PAINLEVEL_OUTOF10: 0
PAINLEVEL_OUTOF10: 0
PAINLEVEL_OUTOF10: 5
PAINLEVEL_OUTOF10: 0

## 2022-01-01 ASSESSMENT — PATIENT HEALTH QUESTIONNAIRE - PHQ9
1. LITTLE INTEREST OR PLEASURE IN DOING THINGS: 0
SUM OF ALL RESPONSES TO PHQ9 QUESTIONS 1 & 2: 0
SUM OF ALL RESPONSES TO PHQ QUESTIONS 1-9: 0
2. FEELING DOWN, DEPRESSED OR HOPELESS: 0

## 2022-01-01 ASSESSMENT — PAIN DESCRIPTION - LOCATION
LOCATION: CHEST

## 2022-01-01 ASSESSMENT — PAIN DESCRIPTION - ONSET
ONSET: PROGRESSIVE
ONSET: PROGRESSIVE

## 2022-01-01 ASSESSMENT — PAIN DESCRIPTION - ORIENTATION
ORIENTATION: RIGHT;LEFT;MID
ORIENTATION: LEFT;RIGHT;MID
ORIENTATION: RIGHT;LEFT;MID

## 2022-01-01 ASSESSMENT — PAIN DESCRIPTION - FREQUENCY
FREQUENCY: CONTINUOUS

## 2022-01-01 ASSESSMENT — PAIN DESCRIPTION - PAIN TYPE
TYPE: ACUTE PAIN

## 2022-01-01 ASSESSMENT — PAIN DESCRIPTION - PROGRESSION
CLINICAL_PROGRESSION: GRADUALLY WORSENING

## 2022-01-01 ASSESSMENT — PAIN - FUNCTIONAL ASSESSMENT: PAIN_FUNCTIONAL_ASSESSMENT: PREVENTS OR INTERFERES SOME ACTIVE ACTIVITIES AND ADLS

## 2022-01-01 ASSESSMENT — PAIN DESCRIPTION - DESCRIPTORS
DESCRIPTORS: ACHING;DISCOMFORT

## 2022-01-01 ASSESSMENT — LIFESTYLE VARIABLES: HOW OFTEN DO YOU HAVE A DRINK CONTAINING ALCOHOL: 0

## 2022-01-04 NOTE — TELEPHONE ENCOUNTER
Reporting:     INR  2.3    PT    28.2    Coumadin Dosin mg Monday & Wednesday-----  Other days, he takes  2.5 mg      Please advise when patient should have the next INR/PT check.     [facility] [place]:   [phone]

## 2022-01-05 NOTE — CARE COORDINATION
Yumiko 45 Transitions Follow Up Call    2022    Patient: Abran Kocher  Patient : 1929   MRN: [id number]  Reason for Admission: ? Pna vs Pl Effusion, Ac Resp Failure, RLE DVT  Discharge Date: 21 RARS: Readmission Risk Score: 17.7 ( )    Future Appointments   Date Time Provider Brian Zamora   3/29/2022  1:15 PM Roxanne Bone MD [facility]     Attempt to reach for Care Transition follow up call unsuccessful. HIPAA compliant message left requesting call back.      Kumar Vasquez RN

## 2022-01-06 NOTE — PROGRESS NOTES
Physician Progress Note      PATIENT:               Diana Glass  CSN #:                  [id number]  :                       1929  ADMIT DATE:       2021 11:49 PM  [address] DATE:        2021 6:00 PM  RESPONDING  PROVIDER #:        Wanda West          QUERY TEXT:    Pt admitted with acute respiratory failure with hypoxemia. Per H&P, \"acute   hypoxemic respiratory failure, concern for possible pulmonary embolus. Right   pleuritic chest pain likely sec to PE. \" If possible, please document in   progress notes and discharge the etiology of acute respiratory failure with   hypoxia. The medical record reflects the following:  Risk Factors: age, history of DVT  Clinical Indicators: Per H&P, \"hypoxic at 85% and tachycardic on arrival.\" CXR   on : Bilateral pulmonary opacities and bilateral pleural effusions,   which could be due to edema or pneumonia. VQ scan : Low probability for   pulmonary embolism. DVT right lower extremity.   Treatment: pulmonology consult, VQ scan, continuous pulse oximetry, O2, IV   Zithromax, IV and po Lasix, thoracentesis    LUCÍA Way, RN  Clinical   [phone]  Options provided:  -- Acute respiratory failure with hypoxemia due to PE  -- Acute respiratory failure with hypoxemia due to pneumonia  -- Acute respiratory failure with hypoxemia due to pulmonary edema  -- Other - I will add my own diagnosis  -- Disagree - Not applicable / Not valid  -- Disagree - Clinically unable to determine / Unknown  -- Refer to Clinical Documentation Reviewer    PROVIDER RESPONSE TEXT:    Acute hypoxemic respiratory failure due to B/L pleural effusions    Query created by: Álvaro Winter on 2021 1:33 PM      Electronically signed by:  Wanda West 2022 1:52 PM

## 2022-01-07 NOTE — TELEPHONE ENCOUNTER
Spoke with Mariela per Dr. Corinne Hoyer note: Marva Marrow to place order for PT as listed for home care service. 2 x week--2 wks  then 1 x week--4 weeks. Mariela voiced understanding.

## 2022-01-10 NOTE — CARE COORDINATION
Yumiko 45 Transitions Follow Up Call    1/10/2022    Patient: Deandre Montanez  Patient : 1929   MRN: [id number]  Reason for Admission: ? Pna vs Pl Effusion, Ac Resp Failure, RLE DVT  Discharge Date: 21 RARS: Readmission Risk Score: 17.7 ( )    Care Transitions Subsequent and Final Call    Schedule Follow Up Appointment with PCP: Declined  Subsequent and Final Calls  Do you have any ongoing symptoms?: No  Have your medications changed?: No  Do you have any questions related to your medications?: No  Do you currently have any active services?: Yes  Are you currently active with any services?: Home Health  Do you have any needs or concerns that I can assist you with?: No  Identified Barriers: Other  Care Transitions Interventions   Home Care Waiver: Declined        Transportation Support: Declined      DME Assistance: Declined     Senior Services: Declined    Other Interventions:         Future Appointments   Date Time Provider Brian Zamora   3/29/2022  1:15 PM Katherine Lopez MD [facility]     Spoke w/ Patient for final CT follow up call. Reports doing \"pretty good, breathings real good too\". Denies cough, sob, wheezing, fever, chills. Reports swelling rle dvt site improved, denies pain. Reports taking all meds as directed, completed Augmentin as ordered by PCP at 22 appt. Reports using oxygen at 2L cont as directed. Denies oxygen supply needs,advised to contact agency if needed. Active w/ [facility], states \"there here all the time\". Denies need for additional in home support, dme, rx. Does not plan to f/u w/ Pulm, citing \"he doesn't do anything for me anyhow\". Advised to contact PCP  re: any health concerns for early outpatient intervention in an effort to avoid hospitalization. Discussed benefits of WIC, Urgent Care. Reviewed, advised Covid19 preventative measures including mask covering nose/mouth, social distancing, hand washing.  Agreeable to final call as well supported by [facility], PCP. Has CTN number should resource needs arise.  Episode closed, no further outreach.      Jaime Sun RN

## 2022-01-12 NOTE — PATIENT INSTRUCTIONS
Personalized Preventive Plan for Rinku Oliver - 1/12/2022  Medicare offers a range of preventive health benefits. Some of the tests and screenings are paid in full while other may be subject to a deductible, co-insurance, and/or copay. Some of these benefits include a comprehensive review of your medical history including lifestyle, illnesses that may run in your family, and various assessments and screenings as appropriate. After reviewing your medical record and screening and assessments performed today your provider may have ordered immunizations, labs, imaging, and/or referrals for you. A list of these orders (if applicable) as well as your Preventive Care list are included within your After Visit Summary for your review. Other Preventive Recommendations:    · A preventive eye exam performed by an eye specialist is recommended every 1-2 years to screen for glaucoma; cataracts, macular degeneration, and other eye disorders. · A preventive dental visit is recommended every 6 months. · Try to get at least 150 minutes of exercise per week or 10,000 steps per day on a pedometer . · Order or download the FREE \"Exercise & Physical Activity: Your Everyday Guide\" from [employer]. Call 3-498.418.6812 or search [employer] online. · You need 8717-3598 mg of calcium and 7209-0328 IU of vitamin D per day. It is possible to meet your calcium requirement with diet alone, but a vitamin D supplement is usually necessary to meet this goal.  · When exposed to the sun, use a sunscreen that protects against both UVA and UVB radiation with an SPF of 30 or greater. Reapply every 2 to 3 hours or after sweating, drying off with a towel, or swimming. · Always wear a seat belt when traveling in a car. Always wear a helmet when riding a bicycle or motorcycle.

## 2022-01-12 NOTE — PROGRESS NOTES
Medicare Annual Wellness Visit  Name: Jose M Dunlap Date: 2022   MRN: [id number] Sex: Male   Age: 80 y.o. Ethnicity: Non- / Non    : 1929 Race: White (non-)      Ashley Corral is here for Medicare AWV    Screenings for behavioral, psychosocial and functional/safety risks, and cognitive dysfunction are all negative except as indicated below. These results, as well as other patient data from the [address] form, are documented in Flowsheets linked to this Encounter. No Known Allergies    Prior to Visit Medications    Medication Sig Taking? Authorizing Provider   Continuous Blood Gluc Sensor ([address] [place] [address]) [facility] As directed continuos monitoring blood glucose Yes Melody March MD   Continuous Blood Gluc Sensor (FREESTYLE MARCO 14 DAY SENSOR) MISC Apply 1 sensor every 14 days Yes Melody March MD   glimepiride (AMARYL) 4 MG tablet Take 1 tablet by mouth every morning (before breakfast) Yes Melody March MD   atenolol (TENORMIN) 50 MG tablet Take 1 tablet by mouth daily Yes Melody March MD   Insulin Degludec (TRESIBA FLEXTOUCH) 200 UNIT/ML SOPN Inject 25 Units into the skin nightly Yes Melody March MD   warfarin (COUMADIN) 5 MG tablet Take as directed per INR Yes Nica Doan MD   insulin aspart (NOVOLOG FLEXPEN) 100 UNIT/ML injection pen Inject 8 Units into the skin 3 times daily (before meals) Yes Melody March MD   atorvastatin (LIPITOR) 80 MG tablet Take 1 tablet by mouth daily Yes Melody March MD   gabapentin (NEURONTIN) 300 MG capsule Take 300 mg by mouth 3 times daily. Yes Historical Provider, MD   oxyCODONE-acetaminophen (PERCOCET) 7.5-325 MG per tablet Take 1 tablet by mouth 2 times daily as needed for Pain. Indications: Son reports Patient takes 3 x daily routinely Avoid Alcohol. ..causes drowsiness.   Yes Historical Provider, MD   [facility] DELICA LANCETS FINE MISC 1 each 3 times daily and as needed. Yes Historical Provider, MD   blood glucose test strips (ONETOUCH VERIO) strip 1 each 3 times daily and as  needed. Yes Historical Provider, MD   furosemide (LASIX) 20 MG tablet Take 2 tablets by mouth every morning  Burt Hercules MD   cilostazol (PLETAL) 50 MG tablet Take 1 tablet by mouth 2 times daily  Janalyn Simmonds, MD       Past Medical History:   Diagnosis Date    CAD (coronary artery disease)     Diabetes mellitus ([address]     Hyperlipidemia     Hypertension        Past Surgical History:   Procedure Laterality Date    ABDOMEN SURGERY      hernia repair    CAROTID ENDARTERECTOMY  2008    Right    CORONARY ANGIOPLASTY WITH STENT PLACEMENT      2 stents    INGUINAL HERNIA REPAIR Left        Family History   Problem Relation Age of Onset    No Known Problems Mother     Stroke Father     No Known Problems Sister     Heart Attack Brother     Heart Disease Brother        CareTeam (Including outside providers/suppliers regularly involved in providing care):   Patient Care Team:  Janalyn Simmonds, MD as PCP - General (Family Medicine)  Janalyn Simmonds, MD as PCP - [facility] Empaneled Provider    Wt Readings from Last 3 Encounters:   12/26/21 167 lb (75.8 kg)   05/12/21 186 lb (84.4 kg)   03/01/21 190 lb (86.2 kg)     There were no vitals filed for this visit. There is no height or weight on file to calculate BMI. Based upon direct observation of the patient, evaluation of cognition reveals remote memory intact, recent memory impaired. Patient's complete Health Risk Assessment and screening values have been reviewed and are found in Flowsheets. The following problems were reviewed today and where indicated follow up appointments were made and/or referrals ordered.     Positive Risk Factor Screenings with Interventions:     Fall Risk:  2 or more falls in past year?: (!) yes  Fall with injury in past year?: no  Fall Risk Interventions:    · Patient declines any further encouraged to make appointment with his/her eye specialist     ADL:  ADLs  In the past 7 days, did you need help from others to perform any of the following everyday activities? Eating, dressing, grooming, bathing, toileting, or walking/balance?: None  In the past 7 days, did you need help from others to take care of any of the following? Laundry, housekeeping, banking/finances, shopping, telephone use, food preparation, transportation, or taking medications?: (!) Laundry,Housekeeping,Banking/Finances,Shopping,Food Preparation,Transportation,Taking Medications  ADL Interventions:  · Patient declines any further evaluation/treatment for this issue, states his son, who lives with him takes care of his ADLs.     Personalized Preventive Plan   Current Health Maintenance Status  Immunization History   Administered Date(s) Administered    COVID-19, J&J, PF, 0.5 mL 12/14/2021    COVID-19, Pfizer, PF, 30mcg/0.3mL 03/02/2021, 03/23/2021    Influenza Virus Vaccine 10/11/2018    Influenza, Dragan Percy, IM, PF (6 mo and older Fluzone, Flulaval, Fluarix, and 3 yrs and older Afluria) 10/30/2019    Influenza, Quadv, Recombinant, IM PF (Flublok 18 yrs and older) 10/05/2020    Influenza, Triv, inactivated, subunit, adjuvanted, IM (Fluad 65 yrs and older) 12/08/2017, 10/11/2018    Pneumococcal Conjugate 13-valent (Dante Sober) 12/08/2017    Tdap (Boostrix, Adacel) 01/02/2020    Zoster Live (Zostavax) 03/21/2015        Health Maintenance   Topic Date Due    Shingles Vaccine (2 of 3) 05/16/2015    Pneumococcal 65+ yrs at Risk Vaccine (2 of 2 - PPSV23) 12/08/2018    Annual Wellness Visit (AWV)  Never done    Flu vaccine (1) 09/01/2021    Depression Screen  03/01/2022    Lipid screen  05/12/2022    Potassium monitoring  12/28/2022    Creatinine monitoring  12/28/2022    DTaP/Tdap/Td vaccine (2 - Td or Tdap) 01/02/2030    COVID-19 Vaccine  Completed    Hepatitis A vaccine  Aged Out    Hib vaccine  Aged Out    Meningococcal (ACWY) vaccine  Aged Out     Recommendations for Dopplr Due: see orders and patient instructions/AVS. Discussed vaccinations due. Unable to obtain 3 vital signs due to patient not having equipment to take blood pressure/temperature. Recommended screening schedule for the next 5-10 years is provided to the patient in written form: see Patient Instructions/AVS.    Charito GRIFFITHS LPN, [birth year], performed the documented evaluation under the direct supervision of the attending physician. Romeo Hoffman, was evaluated through a synchronous (real-time) audio encounter. The patient (or guardian if applicable) is aware that this is a billable service. Verbal consent to proceed has been obtained within the past 12 months. The visit was conducted pursuant to the emergency declaration under the [address] [place] [address], [address] [place] [address] authority and the [employer] and [facility] General Act. Patient identification was verified, and a caregiver was present when appropriate. The patient was located in the state of [place], where the provider was credentialed to provide care. Total time spent for this encounter: Not billed by time    --Charito Salas LPN on 2/36/6730 at 6:33 PM    An electronic signature was used to authenticate this note. This encounter was performed under Robbie jordan MDs, direct supervision, 1/12/2022.

## 2022-01-31 NOTE — TELEPHONE ENCOUNTER
----- Message from Cristin Finch sent at 1/31/2022 11:43 AM EST -----  Subject: Refill Request    QUESTIONS  Name of Medication? furosemide (LASIX) 20 MG tablet  Patient-reported dosage and instructions? 1 tab every morning in addition   to ???? How many days do you have left? 0  Preferred Pharmacy? [facility] phone number (if available)? [phone]  Additional Information for Provider? Pts son called and said pt needs   refill on LASIX 40 mg refilled, is unsure on how much is taken a day. I   couldnt find the 40mg dosage only the 20 mg.  ---------------------------------------------------------------------------  --------------  CALL BACK INFO  What is the best way for the office to contact you? OK to leave message on   voicemail  Preferred Call Back Phone Number?  [phone]

## 2022-02-01 NOTE — TELEPHONE ENCOUNTER
Spoke to [facility] per Note: VERBAL FOR EXTENDED PHY THY  2 X WK--1 WK.  Gave verbal.

## 2022-02-08 NOTE — TELEPHONE ENCOUNTER
Spoke with Herminio Bender, [address]. Patient's PT 18.0, INR 1.5. Patient taking 5 mg Monday and Wednesday, 2.5mg the rest of the week. Per Yossi Cardoza instructed Herminio Bender to alternate 5 mg and 2.5 mg. Recheck in one week. Herminio Bender voiced understanding.

## 2022-02-10 NOTE — ED NOTES
Pt arrived via ems with c/o otalgia that has been going on the last few days. States he feels a \"heart beat\" in his ear. Pts systolic blood pressure was in the 190's for medics. Pt is a/o.  at bedside.       Palak Christian RN  02/09/22 [id number]

## 2022-02-10 NOTE — ED NOTES
Patients son would like to be called if patient gets a room:   Home= [phone]  Cell: [phone]     Karla Emanuel RN  02/09/22 2273

## 2022-02-10 NOTE — ED NOTES
Bed: ED-21  Expected date:   Expected time:   Means of arrival:   Comments:  Rosa Munoz RN  02/10/22 0136

## 2022-02-10 NOTE — ED PROVIDER NOTES
Triage Chief Complaint:   Otalgia (x few days- throbbing ) and Hypertension    [place]:  Corrina Arce is a 80 y.o. male that presents for tenderness. The patient states that over the past 4 to 5 days he has had a whooshing/heartbeat sensation in his left ear that has become faster and more prominent over the last few hours. States it has been constant has been difficult to sleep. Denies any true ear pain or headache. Denies any motor or sensory deficits, dizziness, nausea, vomiting, fevers, vision changes. No other acute complaints. No recent medication changes. ROS:  At least 10 systems reviewed and otherwise acutely negative except as in the [address]     Past Medical History:   Diagnosis Date    CAD (coronary artery disease)     Diabetes mellitus ([facility] [address]     Hyperlipidemia     Hypertension      Past Surgical History:   Procedure Laterality Date    ABDOMEN SURGERY      hernia repair    CAROTID ENDARTERECTOMY  2008    Right    CORONARY ANGIOPLASTY WITH STENT PLACEMENT      2 stents    INGUINAL HERNIA REPAIR Left      Family History   Problem Relation Age of Onset    No Known Problems Mother     Stroke Father     No Known Problems Sister     Heart Attack Brother     Heart Disease Brother      Social History     Socioeconomic History    Marital status:      Spouse name: Not on file    Number of children: Not on file    Years of education: Not on file    Highest education level: Not on file   Occupational History    Not on file   Tobacco Use    Smoking status: Never Smoker    Smokeless tobacco: Never Used   Substance and Sexual Activity    Alcohol use: No    Drug use: No    Sexual activity: Not on file   Other Topics Concern    Not on file   Social History Narrative    Not on file     Social Determinants of Health     Financial Resource Strain: Low Risk     Difficulty of Paying Living Expenses: Not hard at all   Food Insecurity: No Food Insecurity    Worried About Running Out of [employer] in the Last Year: Never true    Ran Out of Food in the Last Year: Never true   Transportation Needs:     Lack of Transportation (Medical): Not on file    Lack of Transportation (Non-Medical): Not on file   Physical Activity:     Days of Exercise per Week: Not on file    Minutes of Exercise per Session: Not on file   Stress:     Feeling of Stress : Not on file   Social Connections:     Frequency of Communication with Friends and Family: Not on file    Frequency of Social Gatherings with Friends and Family: Not on file    Attends Anabaptism Services: Not on file    Active Member of [address] [place] [address] or Organizations: Not on file    Attends Club or Organization Meetings: Not on file    Marital Status: Not on file   Intimate Partner Violence:     Fear of Current or Ex-Partner: Not on file    Emotionally Abused: Not on file    Physically Abused: Not on file    Sexually Abused: Not on file   Housing Stability:     Unable to Pay for Housing in the Last Year: Not on file    Number of [place] in the Last Year: Not on file    Unstable Housing in the Last Year: Not on file     No current facility-administered medications for this encounter.      Current Outpatient Medications   Medication Sig Dispense Refill    amoxicillin-clavulanate (AUGMENTIN) 875-125 MG per tablet Take 1 tablet by mouth 2 times daily for 7 days 14 tablet 0    furosemide (LASIX) 20 MG tablet Take 2 tablets by mouth every morning 90 tablet 1    Continuous Blood Gluc Sensor ([address] [place] [address]) [facility] As directed continuos monitoring blood glucose 1 each 0    Continuous Blood Gluc Sensor (FREESTYLE MARCO 14 DAY SENSOR) MISC Apply 1 sensor every 14 days 2 each 5    glimepiride (AMARYL) 4 MG tablet Take 1 tablet by mouth every morning (before breakfast) 90 tablet 1    atenolol (TENORMIN) 50 MG tablet Take 1 tablet by mouth daily 90 tablet 1    Insulin Degludec (TRESIBA FLEXTOUCH) 200 UNIT/ML SOPN Inject 25 Units into the skin nightly 3 pen 2    warfarin (COUMADIN) 5 MG tablet Take as directed per INR 20 tablet 0    cilostazol (PLETAL) 50 MG tablet Take 1 tablet by mouth 2 times daily 180 tablet 1    insulin aspart (NOVOLOG FLEXPEN) 100 UNIT/ML injection pen Inject 8 Units into the skin 3 times daily (before meals) 5 pen 1    atorvastatin (LIPITOR) 80 MG tablet Take 1 tablet by mouth daily 90 tablet 1    gabapentin (NEURONTIN) 300 MG capsule Take 300 mg by mouth 3 times daily.  oxyCODONE-acetaminophen (PERCOCET) 7.5-325 MG per tablet Take 1 tablet by mouth 2 times daily as needed for Pain. Indications: Son reports Patient takes 3 x daily routinely Avoid Alcohol. ..causes drowsiness.  ONETOUCH DELICA LANCETS FINE MISC 1 each 3 times daily and as needed.  blood glucose test strips (ONETOUCH VERIO) strip 1 each 3 times daily and as  needed. No Known Allergies    Nursing Notes Reviewed    Physical Exam:  ED Triage Vitals   Enc Vitals Group      BP       Pulse       Resp       Temp       Temp src       SpO2       Weight       Height       Head Circumference       Peak Flow       Pain Score       Pain Loc       Pain Edu? Excl. in [address]? GENERAL APPEARANCE: Awake and alert. Cooperative. No acute distress. HEAD: Normocephalic. Atraumatic. EYES: EOM's grossly intact. Sclera anicteric. ENT: Mucous membranes are moist. Tolerates saliva. No trismus. TMs opaque bilaterally with no erythema or bulging  NECK: Supple. No meningismus. Trachea midline. HEART: RRR. Radial pulses 2+. LUNGS: Respirations unlabored. CTAB  ABDOMEN: Soft. Non-tender. No guarding or rebound. EXTREMITIES: No acute deformities. SKIN: Warm and dry. NEUROLOGICAL: EOMI, PERRL, symmetric eyebrow raise and nasolabial folds, no hearing deficits, normal sensation in all branches of trigeminal nerve. Full strength and sensation in the bilateral upper and lower extremities. DTRs 2+ in bilateral patella distributions. No dysmetria. No slurred speech.   No aphasia. No nystagmus. PSYCHIATRIC: Normal mood.     I have reviewed and interpreted all of the currently available lab results from this visit (if applicable):  Results for orders placed or performed during the hospital encounter of 02/09/22   CBC Auto Differential   Result Value Ref Range    WBC 7.4 4.0 - 10.5 K/CU MM    RBC 3.63 (L) 4.6 - 6.2 M/CU MM    Hemoglobin 10.4 (L) 13.5 - 18.0 GM/DL    Hematocrit 33.7 (L) 42 - 52 %    MCV 92.8 78 - 100 FL    MCH 28.7 27 - 31 PG    MCHC 30.9 (L) 32.0 - 36.0 %    RDW 14.9 11.7 - 14.9 %    Platelets 074 271 - 289 K/CU MM    MPV 9.4 7.5 - 11.1 FL    Differential Type AUTOMATED DIFFERENTIAL     Segs Relative 52.0 36 - 66 %    Lymphocytes % 31.0 24 - 44 %    Monocytes % 6.9 (H) 0 - 4 %    Eosinophils % 8.7 (H) 0 - 3 %    Basophils % 1.1 (H) 0 - 1 %    Segs Absolute 3.8 K/CU MM    Lymphocytes Absolute 2.3 K/CU MM    Monocytes Absolute 0.5 K/CU MM    Eosinophils Absolute 0.6 K/CU MM    Basophils Absolute 0.1 K/CU MM    Nucleated RBC % 0.0 %    Total Nucleated RBC 0.0 K/CU MM    Total Immature Neutrophil 0.02 K/CU MM    Immature Neutrophil % 0.3 0 - 0.43 %   Comprehensive Metabolic Panel w/ Reflex to MG   Result Value Ref Range    Sodium 135 135 - 145 MMOL/L    Potassium 4.7 3.5 - 5.1 MMOL/L    Chloride 100 99 - 110 mMol/L    CO2 25 21 - 32 MMOL/L    BUN 23 6 - 23 MG/DL    CREATININE 2.0 (H) 0.9 - 1.3 MG/DL    Glucose 276 (H) 70 - 99 MG/DL    Calcium 8.5 8.3 - 10.6 MG/DL    Albumin 3.1 (L) 3.4 - 5.0 GM/DL    Total Protein 7.1 6.4 - 8.2 GM/DL    Total Bilirubin 0.2 0.0 - 1.0 MG/DL    ALT 8 (L) 10 - 40 U/L    AST 13 (L) 15 - 37 IU/L    Alkaline Phosphatase 90 40 - 129 IU/L    GFR Non- 31 (L) >60 mL/min/1.73m2    GFR  38 (L) >60 mL/min/1.73m2    Anion Gap 10 4 - 16      Radiographs (if obtained):  [] The following radiograph was interpreted by myself in the absence of a radiologist:  [x] Radiologist's Report Reviewed:    EKG (if obtained): (All EKG's are interpreted by myself in the absence of a cardiologist)    MDM:  Plan of care is discussed thoroughly with the patient and family if present. If performed, all imaging and lab work also discussed with patient. All relevant prior results and chart reviewed if available. Patient presents as above. He has low pitched left-sided tinnitus for the past 4 to 5 days in the absence of any other symptoms at this time. He does not have any focal neurologic deficits on exam.  Patient is hypertensive and does have a history of hypertension. He also has a history of right-sided carotid endarterectomy and CAD. CTA was ordered to rule out any evidence of vascular dissection. However, the patient does have chronic renal disease and MRA is done instead. CT noncontrast shows middle ear effusion. The patient does have opaque TMs bilaterally. No obvious otitis media but this could be contributing to the patient's symptoms. MRA does not show any evidence of acute vascular abnormality on the left side. Patient be discharged home with a course of Augmentin and encouraged to follow-up with PCP. He is agreeable with this plan of care. Clinical Impression:  1. Tinnitus of left ear    2.  Fluid level behind tympanic membrane of left ear      (Please note that portions of this note may have been completed with a voice recognition program. Efforts were made to edit the dictations but occasionally words are mis-transcribed.)    MD Jamie Ruelas MD  02/10/22 4051

## 2022-02-15 NOTE — TELEPHONE ENCOUNTER
INR   1.4    Patient is taking Amoxiclave (antibiotic)    5mg every other day, then 2.5 mg---Coumadin dosing     ---------------------------------------------------------  Passed call back to Court Leon[address]. -CHUNN

## 2022-02-15 NOTE — TELEPHONE ENCOUNTER
Per Dr Stephany Camargo patient to take an extra 5mg today only and continue on 5/2.5mg alternating and recheck in about 2 weeks. Home care nurse notified.

## 2022-02-21 NOTE — TELEPHONE ENCOUNTER
Spoke with pt's son Blessing Hatch who states home care nurse stated she could hear fluid on his lungs, pt has been coughing for 2 days. Advised son to take pt to the er. Son voiced understanding.

## 2022-03-01 NOTE — TELEPHONE ENCOUNTER
Eric from [facility]     inr 3/1/22 is 4.2. current coumadin 5 mg mon & weds & 2.5 mg all other days.     Nurse stated she told pt to hold coumadin today

## 2022-03-03 NOTE — TELEPHONE ENCOUNTER
Katherine called to check on coumadin dose for the patient and was informed of change per . Katherine voiced understanding.

## 2022-04-12 PROBLEM — R55 SYNCOPE AND COLLAPSE: Status: ACTIVE | Noted: 2022-01-01

## 2022-04-12 PROBLEM — J90 PLEURAL EFFUSION: Status: ACTIVE | Noted: 2022-01-01

## 2022-04-12 NOTE — PROGRESS NOTES
PHARMACY ANTICOAGULATION MONITORING SERVICE    Sunni Peterson is a 80 y.o. male on warfarin therapy for history of DVT. Pharmacy consulted by Dr. John Glynn for monitoring and adjustment of treatment. Indication for anticoagulation: History of DVT  INR goal: 2 - 3  Warfarin dose prior to admission: 2.5 mg po daily    Pertinent Laboratory Values   Recent Labs     04/11/22  1858 04/11/22 2215 04/12/22 0812   INR  --    < > 1.70   HGB 9.1*  --   --    HCT 30.4*  --   --      --   --     < > = values in this interval not displayed. INR MONITORING  Recent Labs     04/11/22 2215 04/12/22 0812   INR 1.64 1.70   __________________________________    Assessment/Plan:  Kenyatta Cason Drug Interactions:   o Zithromax IV (for pneumonia) - day 1 today, 4/12/2022  o Lovenox (Prophylactic dose)          +THE PATIENT IS CURRENTLY TAKING BOTH COUMADIN AND LOVENOX+  LOOK TO DISCONTINUE THE LOVENOX ONCE THE INR IS THERAPEUTIC.  o Tylenol (PRN)   INR subtherapeutic on admission at 1.70 - today.  Will CONTINUE SAME DOSE AND FREQUENCY = give Warfarin 3 mg today, then resume Warfarin 2.5 mg po daily;   Trend INR daily  [address] will continue to monitor and adjust warfarin therapy as indicated    Thank you for the consult.   Mikey Duncan, [facility]  4/12/2022 9:29 AM

## 2022-04-12 NOTE — H&P
History and Physical-[facility]       Name:  Jose Roberto Parsons /Age/Sex: 1929  (80 y.o. male)   MRN & CSN:  [id number] & [id number] Admission Date/Time: 2022  6:47 PM   Location:  ED15/ED-15 PCP: Nicole Norris, [address] Day: 2    Assessment and Plan:   Jose Roberto Parsons is a 80 y.o.  male  who presents with Syncope and collapse    Syncope and collapse:  Rule out cardiac etiology, patient developing episodes of bradycardia. We will place on telemetry  Initial EKG showed a junctional rhythm, will repeat  Get serial troponins, initial was 0.02  Head CT is  unremarkable    2. Left multifocal pneumonia:  CT chest shows large left pleural effusion and large area of consolidation involving the left mid to lower lung, concerning for airspace disease  We will continue broad-spectrum coverage IV ceftriaxone and Zithromax  Continue oxygen supplementation    3. Large Left pleural effusion  May be malignant, or may be reactive from pneumonia  He was found to have a 13 mm spiculated nodular density in the left upper lobe in 2021. Pleural fluid cytology at that time was negative for malignancy. May need repeat thoracentesis  IR consult, with pleural fluid panel  Consider pulmonary consult. Other comorbidities  4. Chronic kidney disease stage IV: Stable  5. Type 2 diabetes mellitus: Placed on medium dose sliding scale  6. Chronic respiratory failure on 2 L of oxygen at baseline  7. History of DVT, on anticoagulation with Coumadin. INR subtherapeutic  Pharmacy to dose Coumadin        DVT Prophylaxis: coumadin  CODE STATUS: DNR-CCA        History of Present Illness:     Chief Complaint: Syncope and collapse  Jose Roberto Parsons is a 80 y.o.  male  who presents with complaints of syncope. Patient states he was trying to walk to the bathroom when he felt dizzy and passed out.   He also was reported to have another syncopal event on his way to the hospital.  He does have shortness of breath, has been on oxygen since last month when he was hospitalized. He reports having pleurocentesis during his last hospital stay. He denies any cough, no shortness of breath, no fever. He also reports pain on his right abdominal area where he had shingles 3 to 4 months ago. Per EMS, patient was bradycardic. Vital signs are stable in the ED, however while on the floor he has been noted to be bradycardic is again. EKG showed junctional rhythm. Labs do not show leukocytosis, his hemoglobin is 9.1 which is around his baseline, platelet count is normal.  BMP shows creatinine of 2.1, mild hyponatremia 133, troponin is elevated at 0.02, INR of 1.64, lactic acid is normal.      Review of Systems:     Ten point ROS reviewed negative, unless as noted above    Objective:     No intake or output data in the 24 hours ending 04/12/22 0055     Vitals:   Vitals:    04/11/22 1901   BP: (!) 150/61   Pulse:    Resp: 18   Temp:    SpO2: 96%       Physical Exam:     GEN: Awake male, sitting upright in bed in no apparent distress. Appears given age. EYES: Pupils are equally round. No scleral erythema, discharge, or conjunctivitis. HEENT:Mucous membranes are moist. Oral pharynx without exudates, no evidence of thrush. NECK: Supple, no apparent thyromegaly or masses. RESPIRATORY: Reduced air entry on the left, not in respirtory distress. CARDIOVASCULAR: S1/S2 auscultated. Bradycardia. No peripheral edema. GASTROINTESTINAL:Abdomen is soft without significant tenderness,  Bowel sounds are normoactive. Rectal exam deferred. GENITOURINARY:No costovertebral angle tenderness. Normal appearing external genitalia. Villa catheter is not present. MUSCULOSKELETAL:No gross joint deformities. SKIN:Normal coloration, warm, dry. NEURO:Cranial nerves appear grossly intact, normal speech, no lateralizing weakness. PSYCH:Awake, alert, oriented x 3. Affect appropriate.     Past Medical History:      Past Ex-Partner: Not on file    Emotionally Abused: Not on file    Physically Abused: Not on file    Sexually Abused: Not on file   Housing Stability:     Unable to Pay for Housing in the Last Year: Not on file    Number of Places Lived in the Last Year: Not on file    Unstable Housing in the Last Year: Not on file       Medications:   Medications:    cefTRIAXone (ROCEPHIN) IV  1,000 mg IntraVENous Once    azithromycin  500 mg IntraVENous Once      Infusions:   PRN Meds:        Electronically signed by Wendy Hercules MD on 4/12/2022 at 12:55 AM

## 2022-04-12 NOTE — CONSULTS
Pulmonary Consult Note      Reason for Consult: acute on chronic respiratory failure  Requesting Physician: WHITLEY Eduardo CNP  Subjective:   CHIEF COMPLAINT :Syncope and Collapse    Patient Active Problem List    Diagnosis Date Noted    Syncope and collapse 04/12/2022    Pleural effusion 04/12/2022    Acute respiratory failure with hypoxemia ([facility] [address] 12/20/2021    Hypoxia     Hyponatremia     Acute pulmonary edema (HCC)     GERD (gastroesophageal reflux disease) 07/30/2021    Type 2 diabetes mellitus with kidney complication, with long-term current use of insulin ([facility] [address] 07/30/2021    Stage 4 chronic kidney disease ([facility] [address] 05/13/2021    Essential hypertension 05/12/2021    Other hyperlipidemia 05/12/2021    Other dysphagia 05/12/2021    Bilateral carotid artery stenosis 08/16/2016        HPI:                The patient is a 80 y.o. male with significant past medical history of PE, CAD, DM II, HLD, HTN  presents with complaints of Syncope and collapse. She was brought to the ED after she passed out at home. She was bradycardic in the 30's to 40's.  She     Past Medical History:      Diagnosis Date    CAD (coronary artery disease)     Diabetes mellitus ([facility] [address]     Hyperlipidemia     Hypertension       Past Surgical History:        Procedure Laterality Date    ABDOMEN SURGERY      hernia repair    CAROTID ENDARTERECTOMY  2008    Right    CORONARY ANGIOPLASTY WITH STENT PLACEMENT      2 stents    INGUINAL HERNIA REPAIR Left      Current Medications:     sodium chloride flush  5-40 mL IntraVENous 2 times per day    enoxaparin  30 mg SubCUTAneous Daily    cefTRIAXone (ROCEPHIN) IV  1,000 mg IntraVENous Q24H    insulin lispro  0-12 Units SubCUTAneous TID     insulin lispro  0-6 Units SubCUTAneous Nightly    azithromycin  500 mg IntraVENous Q24H    [START ON 4/13/2022] warfarin  2.5 mg Oral Daily    warfarin  3 mg Oral Once    theophylline  100 mg Oral Daily     Allergies:    Social History: TOBACCO:   reports that he has never smoked. He has never used smokeless tobacco.  ETOH:   reports no history of alcohol use. Patient currently lives independently    Family History:       Problem Relation Age of Onset    No Known Problems Mother     Stroke Father     No Known Problems Sister     Heart Attack Brother     Heart Disease Brother        REVIEW OF SYSTEMS:    CONSTITUTIONAL:  negative for fevers, chills, diaphoresis, activity change, appetite change, fatigue, night sweats and unexpected weight change. EYES:  negative for blurred vision, eye discharge, visual disturbance and icterus  HEENT:  negative for hearing loss, tinnitus, ear drainage, sinus pressure, nasal congestion, epistaxis and snoring  RESPIRATORY:  See HPI  CARDIOVASCULAR:  negative for chest pain, palpitations, exertional chest pressure/discomfort, edema, syncope  GASTROINTESTINAL:  negative for nausea, vomiting, diarrhea, constipation, blood in stool and abdominal pain  GENITOURINARY:  negative for frequency, dysuria, urinary incontinence, decreased urine volume, and hematuria  HEMATOLOGIC/LYMPHATIC:  negative for easy bruising, bleeding and lymphadenopathy  ALLERGIC/IMMUNOLOGIC:  negative for recurrent infections, angioedema, anaphylaxis and drug reactions  ENDOCRINE:  negative for weight changes and diabetic symptoms including polyuria, polydipsia and polyphagia  MUSCULOSKELETAL:  negative for  pain, joint swelling, decreased range of motion and muscle weakness  NEUROLOGICAL:  negative for headaches, slurred speech, unilateral weakness  PSYCHIATRIC/BEHAVIORAL: negative for hallucinations, behavioral problems, confusion and agitation.      Objective:   PHYSICAL EXAM:      VITALS:  BP (!) 66/51   Pulse 54   Temp 97.4 °F (36.3 °C)   Resp 16   SpO2 100%   24HR INTAKE/OUTPUT:      Intake/Output Summary (Last 24 hours) at 4/12/2022 1400  Last data filed at 4/12/2022 0760  Gross per 24 hour   Intake --   Output 400 ml   Net -400 ml CURRENT PULSE OXIMETRY:  SpO2: 100 %  24HR PULSE OXIMETRY RANGE:  SpO2  Av.7 %  Min: 82 %  Max: 100 %    CONSTITUTIONAL:  awake, alert, cooperative, no apparent distress, and appears stated age  NECK:  Supple, symmetrical, trachea midline, no adenopathy, thyroid symmetric, not enlarged and no tenderness, skin normal  LUNGS:  Decreased air entry left base  CARDIOVASCULAR:  normal S1 and S2, no edema and no JVD  ABDOMEN:  normal bowel sounds, non-distended and no masses palpated, and no tenderness to palpation. No hepatospleenomegaly  LYMPHADENOPATHY:  no axillary or supraclavicular adenopathy. No cervical adnenopathy  PSYCHIATRIC: Oriented to person place and time. No obvious depression or anxiety. MUSCULOSKELETAL: No obvious misalignment or effusion of the joints. No clubbing, cyanosis of the digits. SKIN:  normal skin color, texture, turgor and no redness, warmth, or swelling.  No palpable nodules    DATA:    Old records have been reviewed  CBC with Differential:    Lab Results   Component Value Date    WBC 6.1 2022    RBC 3.12 2022    HGB 9.1 2022    HCT 30.4 2022     2022    MCV 97.4 2022    MCH 29.2 2022    MCHC 29.9 2022    RDW 13.8 2022    SEGSPCT 54.1 2022    LYMPHOPCT 31.7 2022    MONOPCT 6.1 2022    BASOPCT 1.2 2022    MONOSABS 0.4 2022    LYMPHSABS 1.9 2022    EOSABS 0.4 2022    BASOSABS 0.1 2022    DIFFTYPE AUTOMATED DIFFERENTIAL 2022     BMP:    Lab Results   Component Value Date     2022    K 4.2 2022    CL 99 2022    CO2 22 2022    BUN 19 2022    CREATININE 2.1 2022    CALCIUM 8.4 2022    GFRAA 36 2022    LABGLOM 30 2022    GLUCOSE 217 2022     Hepatic Function Panel:    Lab Results   Component Value Date    ALKPHOS 90 2022    ALT 8 2022    AST 13 2022    PROT 7.1 2022    PROT 6.9 10/10/2012 BILITOT 0.1 04/11/2022     ABG:    Lab Results   Component Value Date    ABZ9YDH 28.3 12/21/2021    CDB3IHJ 49.0 12/21/2021    PO2ART 59 12/21/2021       Cultures:   Pending    Radiology Review:    Small left basilar pneumonia.  Trace effusion.  No pneumothorax  No evidence of pulmonary embolism.       Large left pleural effusion. Large area of consolidation involving the left   mid to lower lung which could be related to compressive atelectasis but is   concerning for an underlying airspace disease process. Small area of   consolidation at the right lower lung, prior presenting atelectasis.       Moderate size hiatal hernia. Assessment/Plan       Patient Active Problem List    Diagnosis Date Noted    Syncope and collapse 04/12/2022    Pleural effusion 04/12/2022    Acute respiratory failure with hypoxemia (HCC) 12/20/2021    Hypoxia     Hyponatremia     Acute pulmonary edema (HCC)     GERD (gastroesophageal reflux disease) 07/30/2021    Type 2 diabetes mellitus with kidney complication, with long-term current use of insulin ([facility] [address] 07/30/2021    Stage 4 chronic kidney disease ([facility] [address] 05/13/2021    Essential hypertension 05/12/2021    Other hyperlipidemia 05/12/2021    Other dysphagia 05/12/2021    Bilateral carotid artery stenosis 08/16/2016     Suspected LLL Pneumonia  Large left pleural effusion s/p thoracentesis- transudate  Moderate MN  CKD  Acute on Chronic Hypoxic hypercapneic resp failure  Symptomatic bradycardiam sec to SSS      PLAN  1. Empiric Abx  2. Pleural fluid analysis with cytology  3. DNRCCA  4. Keep sats > 92%  5. ICS  6. BIPAP prn  7. DVT and GI Prophylaxis  8. C/w present management  9. CXR in am  10.  Await Pacemaker            Electronically signed by Tuan Marin MD on 4/12/2022 at 2:00 PM

## 2022-04-12 NOTE — ED PROVIDER NOTES
Triage Chief Complaint:   Dizziness and Other (Possible seizure like activity)    [place]:  Marj Irwin is a 80 y.o. male that presents with recurrent dizziness episodes and syncope. Patient was in patient until today when the above started. Patient describes dizziness as lightheadedness that seems to be present when he exerts himself. Patient reports that \"they tell me I passed out\". Patient had an episode of losing consciousness at his living facility as well as with the squad. On arrival here to the emergency department patient walked to the bathroom prior to my assessment and was bradycardic into the 30s and 40s and quite symptomatic. Patient presently at rest reporting that he feels much improved. No dizziness at this time. No pain at this time. Some associated nausea patient reports that during his earlier episode he did get sweaty. Per prehospital report during patient's episode where he lost consciousness there was concern for possible seizure-like activity as well but history is otherwise limited regarding this. Patient does have a history of known coronary artery disease. Patient does have a history of a pulmonary embolism. Patient is on Coumadin as well as Lasix. Additionally, patient with history of pleural effusion status post thoracentesis during last hospitalization.     ROS:  General:  No fevers, no chills, no weakness  Eyes:  No recent vison changes, no discharge  ENT:  No sore throat, no nasal congestion, no hearing changes  Cardiovascular:  No chest pain, no palpitations, + lightheadedness, + passing out  Respiratory:  No shortness of breath, no cough, no wheezing  Gastrointestinal:  No pain, + nausea, no vomiting, no diarrhea  Musculoskeletal:  No muscle pain, no joint pain  Skin:  No rash, no pruritis, no easy bruising, + sweating  Neurologic:  No speech problems, no headache, no extremity numbness, no extremity tingling, no extremity weakness, + dizziness  Psychiatric:  No anxiety  Genitourinary:  No dysuria, no hematuria  Endocrine:  No unexpected weight gain, no unexpected weight loss  Extremities:  no edema, no pain    Past Medical History:   Diagnosis Date    CAD (coronary artery disease)     Diabetes mellitus ([facility] [address]     Hyperlipidemia     Hypertension      Past Surgical History:   Procedure Laterality Date    ABDOMEN SURGERY      hernia repair    CAROTID ENDARTERECTOMY  2008    Right    CORONARY ANGIOPLASTY WITH STENT PLACEMENT      2 stents    INGUINAL HERNIA REPAIR Left      Family History   Problem Relation Age of Onset    No Known Problems Mother     Stroke Father     No Known Problems Sister     Heart Attack Brother     Heart Disease Brother      Social History     Socioeconomic History    Marital status:      Spouse name: Not on file    Number of children: Not on file    Years of education: Not on file    Highest education level: Not on file   Occupational History    Not on file   Tobacco Use    Smoking status: Never Smoker    Smokeless tobacco: Never Used   Substance and Sexual Activity    Alcohol use: No    Drug use: No    Sexual activity: Not on file   Other Topics Concern    Not on file   Social History Narrative    Not on file     Social Determinants of Health     Financial Resource Strain: Low Risk     Difficulty of Paying Living Expenses: Not hard at all   Food Insecurity: No Food Insecurity    Worried About Running Out of Food in the Last Year: Never true    Finn of Food in the Last Year: Never true   Transportation Needs:     Lack of Transportation (Medical): Not on file    Lack of Transportation (Non-Medical):  Not on file   Physical Activity:     Days of Exercise per Week: Not on file    Minutes of Exercise per Session: Not on file   Stress:     Feeling of Stress : Not on file   Social Connections:     Frequency of Communication with Friends and Family: Not on file    Frequency of Social Gatherings with Friends and Family: Not on file    Attends Yazdanism Services: Not on file    Active Member of Clubs or Organizations: Not on file    Attends Club or Organization Meetings: Not on file    Marital Status: Not on file   Intimate Partner Violence:     Fear of Current or Ex-Partner: Not on file    Emotionally Abused: Not on file    Physically Abused: Not on file    Sexually Abused: Not on file   Housing Stability:     Unable to Pay for Housing in the Last Year: Not on file    Number of [place] in the Last Year: Not on file    Unstable Housing in the Last Year: Not on file     Current Facility-Administered Medications   Medication Dose Route Frequency Provider Last Rate Last Admin    [START ON 4/12/2022] cefTRIAXone (ROCEPHIN) 1000 mg IVPB in 50 mL D5W minibag  1,000 mg IntraVENous Once Bhavani Franks MD       [facility] [START ON 4/12/2022] azithromycin (ZITHROMAX) 500 mg in dextrose 5 % 250 mL IVPB  500 mg IntraVENous Once Bhavani Franks MD         Current Outpatient Medications   Medication Sig Dispense Refill    furosemide (LASIX) 20 MG tablet Take 2 tablets by mouth every morning 90 tablet 1    Continuous Blood Gluc Sensor ([facility]) MISC As directed continuos monitoring blood glucose 1 each 0    Continuous Blood Gluc Sensor ([employer] MARCO 14 DAY SENSOR) MISC Apply 1 sensor every 14 days 2 each 5    glimepiride (AMARYL) 4 MG tablet Take 1 tablet by mouth every morning (before breakfast) 90 tablet 1    atenolol (TENORMIN) 50 MG tablet Take 1 tablet by mouth daily 90 tablet 1    Insulin Degludec (TRESIBA FLEXTOUCH) 200 UNIT/ML SOPN Inject 25 Units into the skin nightly 3 pen 2    warfarin (COUMADIN) 5 MG tablet Take as directed per INR 20 tablet 0    cilostazol (PLETAL) 50 MG tablet Take 1 tablet by mouth 2 times daily 180 tablet 1    insulin aspart (NOVOLOG FLEXPEN) 100 UNIT/ML injection pen Inject 8 Units into the skin 3 times daily (before meals) 5 pen 1    atorvastatin (LIPITOR) 80 MG tablet Take 1 tablet by mouth daily 90 tablet 1    gabapentin (NEURONTIN) 300 MG capsule Take 300 mg by mouth 3 times daily.  oxyCODONE-acetaminophen (PERCOCET) 7.5-325 MG per tablet Take 1 tablet by mouth 2 times daily as needed for Pain. Indications: Son reports Patient takes 3 x daily routinely Avoid Alcohol. ..causes drowsiness.  ONETOUCH DELICA LANCETS FINE MISC 1 each 3 times daily and as needed.  blood glucose test strips (ONETOUCH VERIO) strip 1 each 3 times daily and as  needed. No Known Allergies    Nursing Notes Reviewed    Physical Exam:  ED Triage Vitals   Enc Vitals Group      BP 04/11/22 1850 (!) 158/130      Pulse 04/11/22 1850 65      Resp 04/11/22 1901 18      Temp 04/11/22 1851 97.5 °F (36.4 °C)      Temp Source 04/11/22 1851 Oral      SpO2 04/11/22 1901 96 %      Weight --       Height --       Head Circumference --       Peak Flow --       Pain Score --       Pain Loc --       Pain Edu? --       Excl. in [address]? --        My pulse ox interpretation is - normal    General appearance:  No acute distress. Appears stated age. Skin:  Warm. Dry. No diaphoresis. Eye:  Extraocular movements intact. Ears, nose, mouth and throat:  Oral mucosa moist   Neck:  Trachea midline. Extremity:  No swelling. Normal ROM     Heart:  Regular rate and rhythm, normal S1 & S2, no extra heart sounds. Perfusion:  Intact. strong symmetric bilateral radial and PT pulses. Respiratory: There are diminished breath sounds bilateral bases with left worse than right. Respirations nonlabored. Speaking clearly in full sentences. Abdominal:  Normal bowel sounds. Soft. Nontender. Non distended. Back:  No CVA tenderness to palpation     Neurological:  Alert and oriented times 3. No focal neuro deficits.              Psychiatric:  Appropriate    I have reviewed and interpreted all of the currently available lab results from this visit (if applicable):  Results for orders placed or performed during the hospital encounter of 04/11/22   CBC with Auto Differential   Result Value Ref Range    WBC 6.1 4.0 - 10.5 K/CU MM    RBC 3.12 (L) 4.6 - 6.2 M/CU MM    Hemoglobin 9.1 (L) 13.5 - 18.0 GM/DL    Hematocrit 30.4 (L) 42 - 52 %    MCV 97.4 78 - 100 FL    MCH 29.2 27 - 31 PG    MCHC 29.9 (L) 32.0 - 36.0 %    RDW 13.8 11.7 - 14.9 %    Platelets 151 226 - 714 K/CU MM    MPV 11.0 7.5 - 11.1 FL    Differential Type AUTOMATED DIFFERENTIAL     Segs Relative 54.1 36 - 66 %    Lymphocytes % 31.7 24 - 44 %    Monocytes % 6.1 (H) 0 - 4 %    Eosinophils % 6.4 (H) 0 - 3 %    Basophils % 1.2 (H) 0 - 1 %    Segs Absolute 3.3 K/CU MM    Lymphocytes Absolute 1.9 K/CU MM    Monocytes Absolute 0.4 K/CU MM    Eosinophils Absolute 0.4 K/CU MM    Basophils Absolute 0.1 K/CU MM    Nucleated RBC % 0.0 %    Total Nucleated RBC 0.0 K/CU MM    Total Immature Neutrophil 0.03 K/CU MM    Immature Neutrophil % 0.5 (H) 0 - 0.43 %   Comprehensive Metabolic Panel   Result Value Ref Range    Sodium 133 (L) 135 - 145 MMOL/L    Potassium 4.2 3.5 - 5.1 MMOL/L    Chloride 99 99 - 110 mMol/L    CO2 22 21 - 32 MMOL/L    BUN 19 6 - 23 MG/DL    CREATININE 2.1 (H) 0.9 - 1.3 MG/DL    Glucose 217 (H) 70 - 99 MG/DL    Calcium 8.4 8.3 - 10.6 MG/DL    Albumin 3.4 3.4 - 5.0 GM/DL    Total Protein 7.1 6.4 - 8.2 GM/DL    Total Bilirubin 0.1 0.0 - 1.0 MG/DL    ALT 8 (L) 10 - 40 U/L    AST 13 (L) 15 - 37 IU/L    Alkaline Phosphatase 90 40 - 129 IU/L    GFR Non-African American 30 (L) >60 mL/min/1.73m2    GFR  36 (L) >60 mL/min/1.73m2    Anion Gap 12 4 - 16   Troponin   Result Value Ref Range    Troponin T 0.020 (H) <0.01 NG/ML   D-Dimer, Quantitative   Result Value Ref Range    D-Dimer, Quant 709 (H) <230 NG/mL(DDU)   Urinalysis with Microscopic   Result Value Ref Range    Color, UA YELLOW YELLOW    Clarity, UA CLEAR CLEAR    Glucose, Urine >1,000 (A) NEGATIVE MG/DL    Bilirubin Urine NEGATIVE NEGATIVE MG/DL    Ketones, Urine NEGATIVE NEGATIVE MG/DL    Specific Gravity, UA 1.015 1.001 - 1.035    Blood, Urine SMALL (A) NEGATIVE    pH, Urine 6.0 5.0 - 8.0    Protein, UA 30 (A) NEGATIVE MG/DL    Urobilinogen, Urine NORMAL 0.2 - 1.0 MG/DL    Nitrite Urine, Quantitative NEGATIVE NEGATIVE    Leukocyte Esterase, Urine NEGATIVE NEGATIVE    RBC, UA <1 0 - 3 /HPF    WBC, UA NONE SEEN 0 - 2 /HPF    Bacteria, UA RARE (A) NEGATIVE /HPF    Squam Epithel, UA <1 /HPF    Mucus, UA RARE (A) NEGATIVE HPF    Hyaline Casts, UA 2 /LPF    Granular Casts, UA 4 /LPF   Protime/INR & PTT   Result Value Ref Range    Protime 21.3 (H) 11.7 - 14.5 SECONDS    INR 1.64 INDEX    aPTT 31.4 25.1 - 37.1 SECONDS   EKG 12 Lead   Result Value Ref Range    Ventricular Rate 65 BPM    Atrial Rate 64 BPM    QRS Duration 96 ms    Q-T Interval 398 ms    QTc Calculation (Bazett) 413 ms    R Axis 2 degrees    T Axis -1 degrees    Diagnosis       Junctional rhythm  Minimal voltage criteria for LVH, may be normal variant  Abnormal ECG  When compared with ECG of 20-DEC-2021 01:12,  Junctional rhythm has replaced Sinus rhythm  Inverted T waves have replaced nonspecific T wave abnormality in Inferior leads        Radiographs (if obtained):  [] The following radiograph was interpreted by myself in the absence of a radiologist:   [x] Radiologist's Report Reviewed:  CT HEAD WO CONTRAST   Final Result   Chronic age-appropriate generalized cortical atrophy with microvascular   chronic ischemic change. Similar appearing left mastoid effusion, with fluid noted in the left middle   ear cavity. CTA PULMONARY W CONTRAST   Final Result   No evidence of pulmonary embolism. Large left pleural effusion. Large area of consolidation involving the left   mid to lower lung which could be related to compressive atelectasis but is   concerning for an underlying airspace disease process. Small area of   consolidation at the right lower lung, prior presenting atelectasis. Moderate size hiatal hernia. XR CHEST PORTABLE   Final Result   1. Central pulmonary vascular congestion with moderate left and likely trace   right effusion and associated atelectasis. EKG (if obtained): (All EKG's are interpreted by myself in the absence of a cardiologist)  12 lead EKG per my interpretation:  Normal Sinus Rhythm at 65  Axis is   Normal  QTc is  within an acceptable range  There is no specific T wave changes appreciated. There is no specific ST wave changes appreciated. No STEMI    Prior EKG to compare with was available and no clinically significant change in overall morphology compared to prior. Chart review shows recent radiographs:  XR CHEST PORTABLE    Result Date: 4/11/2022  EXAMINATION: ONE XRAY VIEW OF THE CHEST 4/11/2022 9:07 pm COMPARISON: Chest x-ray December 25, 2021; CT chest December 21, 2021 HISTORY: ORDERING SYSTEM PROVIDED HISTORY: syncope, recent pleural effusion s/p thora TECHNOLOGIST PROVIDED HISTORY: Reason for exam:->syncope, recent pleural effusion s/p thora Reason for Exam: dizziness, other Additional signs and symptoms: syncope, recent pleural effusion, s/p thora FINDINGS: Cardiac silhouette is enlarged but grossly stable in appearance. Moderate-sized left effusion and associated atelectasis. Suspect trace right effusion and atelectasis. No pneumothorax. Central pulmonary vascular congestion present with no overt pulmonary edema. 1. Central pulmonary vascular congestion with moderate left and likely trace right effusion and associated atelectasis. MDM:  Pt presents as above. Emergent conditions considered. Presentation prompted initial labs, EKG and imaging. IV is established and IV fluid bolus is given. Urinalysis is not consistent with a urinary tract infection. Troponin is abnormal will need to be trended. CBC is with chronic anemia. No leukocytosis.   CMP is with chronic kidney disease and mild hyperglycemia without elevated anion gap metabolic acidosis. D-dimer is abnormal at 709. INR is subtherapeutic which prompted CT imaging of chest to rule out PE.    CTA is without pulmonary embolism but does demonstrate a large right-sided pleural effusion as well as a smaller left-sided pleural effusion. Additionally, there is concern for underlying airspace disease process. I will cover with Rocephin and azithromycin after blood cultures are obtained. Given patient's syncope (including bradycardia event in the emergency department), advanced age, size of pleural effusion decision made to admit the patient for further evaluation. Case to be discussed with hospitalist team and patient to be admitted to medicine. Questions sought and answered with the patient. They voice understanding and agree with plan. Care of this patient occurred during the COVID-19 pandemic. Clinical Impression:  1. Syncope and collapse    2. Seizure-like activity ([address]    3. Dizziness    4. Pleural effusion      Disposition referral (if applicable):  No follow-up provider specified. Disposition medications (if applicable):  New Prescriptions    No medications on file       Comment: Please note this report has been produced using speech recognition software and may contain errors related to that system including errors in grammar, punctuation, and spelling, as well as words and phrases that may be inappropriate. If there are any questions or concerns please feel free to contact the dictating provider for clarification.        Tim Franks MD  04/12/22 0000

## 2022-04-12 NOTE — ED NOTES
This Paramedic assisted the pt with ambulating to the bathroom. Pt stated he needed to have a bowel movement and I obtained the ordered urinalysis from the pt while he was using the bathroom. I assisted the pt back to his room where he was reconnected to the monitor and oxygen at 4 lpm via nasal cannula.     Farnsisca Arndt, GISELL Diallo  04/11/22 0755

## 2022-04-12 NOTE — ED NOTES
Report given to [facility] RN at this time, all questions answered.       Carmen Domingo RN  04/12/22 9704

## 2022-04-12 NOTE — CONSULTS
Electrophysiology Consult Note      Reason for consultation:  Bradycardia    Chief complaint : Syncope and Collapse    Referring physician: Elijah Garza      Primary care physician: Jhon Orourke MD      History of Present Illness:       Enid Uriarte is 80year old male with a history of coronary artery disease s/P PCI, diabetes type 2, hyperlipidemia, hypertension, right carotid endarterectomy presents with complaints of syncope and collapse. He reports that he was at home when he began to feel dizzy and lightheaded. He states that he called the EMS because he thought he was going to pass out. He states that he did pass out on his way to the emergency room. Patient reports that he has a history of syncope. Patient was found to be bradycardic and hypotension in the ER. Patient denies chest pain, palpitations, shortness of breath, or edema. He was found this admission to have large left pleural effusion. He had a thoracentesis with -1L of serous fluid. There is a concern that patient could have pneumonia. He is on antibiotics. Pastmedical history:   Past Medical History:   Diagnosis Date    CAD (coronary artery disease)     Diabetes mellitus ([facility] [address]     Hyperlipidemia     Hypertension        Surgical history :   Past Surgical History:   Procedure Laterality Date    ABDOMEN SURGERY      hernia repair    CAROTID ENDARTERECTOMY  2008    Right    CORONARY ANGIOPLASTY WITH STENT PLACEMENT      2 stents    INGUINAL HERNIA REPAIR Left        Family history:   Family History   Problem Relation Age of Onset    No Known Problems Mother     Stroke Father     No Known Problems Sister     Heart Attack Brother     Heart Disease Brother        Social history :  reports that he has never smoked. He has never used smokeless tobacco. He reports that he does not drink alcohol and does not use drugs.     No Known Allergies    No current facility-administered medications on file prior to encounter. Current Outpatient Medications on File Prior to Encounter   Medication Sig Dispense Refill    furosemide (LASIX) 20 MG tablet Take 2 tablets by mouth every morning 90 tablet 1    Continuous Blood Gluc Sensor ([address] [place] [address]) [facility] As directed continuos monitoring blood glucose 1 each 0    Continuous Blood Gluc Sensor ([employer] MARCO 14 DAY SENSOR) MISC Apply 1 sensor every 14 days 2 each 5    glimepiride (AMARYL) 4 MG tablet Take 1 tablet by mouth every morning (before breakfast) 90 tablet 1    atenolol (TENORMIN) 50 MG tablet Take 1 tablet by mouth daily 90 tablet 1    Insulin Degludec (TRESIBA FLEXTOUCH) 200 UNIT/ML SOPN Inject 25 Units into the skin nightly 3 pen 2    warfarin (COUMADIN) 5 MG tablet Take as directed per INR 20 tablet 0    cilostazol (PLETAL) 50 MG tablet Take 1 tablet by mouth 2 times daily 180 tablet 1    insulin aspart (NOVOLOG FLEXPEN) 100 UNIT/ML injection pen Inject 8 Units into the skin 3 times daily (before meals) 5 pen 1    atorvastatin (LIPITOR) 80 MG tablet Take 1 tablet by mouth daily 90 tablet 1    gabapentin (NEURONTIN) 300 MG capsule Take 300 mg by mouth 3 times daily.  oxyCODONE-acetaminophen (PERCOCET) 7.5-325 MG per tablet Take 1 tablet by mouth 2 times daily as needed for Pain. Indications: Son reports Patient takes 3 x daily routinely Avoid Alcohol. ..causes drowsiness.  ONETOUCH DELICA LANCETS FINE MISC 1 each 3 times daily and as needed.  blood glucose test strips (ONETOUCH VERIO) strip 1 each 3 times daily and as  needed. Review of Systems:   Review of Systems   Constitutional: Positive for fatigue. Negative for activity change, chills and fever. HENT: Negative for congestion, ear pain and tinnitus. Eyes: Negative for photophobia, pain and visual disturbance. Respiratory: Negative for cough, chest tightness, shortness of breath and wheezing. Cardiovascular: Negative for chest pain, palpitations and leg swelling. Gastrointestinal: Negative for abdominal pain, blood in stool, constipation, diarrhea, nausea and vomiting. Endocrine: Negative for cold intolerance and heat intolerance. Genitourinary: Negative for dysuria, flank pain and hematuria. Musculoskeletal: Positive for arthralgias and gait problem. Negative for back pain, myalgias and neck stiffness. Skin: Negative for color change and rash. Allergic/Immunologic: Negative for food allergies. Neurological: Positive for dizziness, syncope and light-headedness. Negative for numbness and headaches. Hematological: Does not bruise/bleed easily. Psychiatric/Behavioral: Negative for agitation, behavioral problems and confusion. Examination:      Vitals:    04/12/22 1151 04/12/22 1155 04/12/22 1202 04/12/22 1232   BP: (!) 76/40  (!) 56/35 (!) 66/51   Pulse: 55 55 55 54   Resp: 19  19 16   Temp: 97.4 °F (36.3 °C)   97.4 °F (36.3 °C)   TempSrc:       SpO2: (!) 89%  96% 100%        There is no height or weight on file to calculate BMI. Physical Exam  Constitutional:       General: He is not in acute distress. Appearance: He is ill-appearing. He is not diaphoretic. HENT:      Head: Normocephalic and atraumatic. Right Ear: External ear normal.      Left Ear: External ear normal.      Nose: No congestion. Mouth/Throat:      Mouth: Mucous membranes are moist.   Eyes:      Extraocular Movements: Extraocular movements intact. Conjunctiva/sclera: Conjunctivae normal.      Pupils: Pupils are equal, round, and reactive to light. Cardiovascular:      Rate and Rhythm: Regular rhythm. Bradycardia present. Heart sounds: Murmur (grade 2/6 systolic murmur) heard. Pulmonary:      Effort: Pulmonary effort is normal. No respiratory distress. Breath sounds: Normal breath sounds. No wheezing or rhonchi. Abdominal:      General: Abdomen is flat.  Bowel sounds are normal. There is no distension. Palpations: Abdomen is soft. Tenderness: There is no abdominal tenderness. Musculoskeletal:         General: No tenderness. Cervical back: Normal range of motion. No tenderness. Right lower leg: No edema. Left lower leg: No edema. Skin:     General: Skin is warm. Neurological:      General: No focal deficit present. Mental Status: He is alert and oriented to person, place, and time. Cranial Nerves: No cranial nerve deficit. Psychiatric:         Mood and Affect: Mood normal.               CBC:   Lab Results   Component Value Date    WBC 6.1 04/11/2022    HGB 9.1 04/11/2022    HCT 30.4 04/11/2022     04/11/2022     Lipids:  Lab Results   Component Value Date    CHOL 183 05/12/2021    TRIG 163 (H) 05/12/2021    HDL 32 (L) 05/12/2021    LDLCALC 118 (H) 05/12/2021     PT/INR:   Lab Results   Component Value Date    INR 1.70 04/12/2022        BMP:    Lab Results   Component Value Date     (L) 04/11/2022    K 4.2 04/11/2022    CL 99 04/11/2022    CO2 22 04/11/2022    BUN 19 04/11/2022     CMP:   Lab Results   Component Value Date    AST 13 (L) 04/11/2022    PROT 7.1 04/11/2022    BILITOT 0.1 04/11/2022    ALKPHOS 90 04/11/2022     TSH:  No results found for: TSH, T4    EKGINTERPRETATION - EKG Interpretation:  Sinus rhythm, Possible LVH 4/11/2022 EKG      IMPRESSION / RECOMMENDATIONS:     Hypotension  Bradycardia  Syncope  Pneumonia  Pleural effusion        Patient heart rate currently in 55-60 but he is severe hypotension. Patient is being evaluated for sepsis. I do not think his hypotension is bradycardia induced at this time. Patient is being started on Levophed drip. There is concern of pneumonia and pleural effusions on the patient. Recommend to get ABG. Patient denying any chest pain but feels overall fatigued that is what he reports.     Check echo     We will continue to follow along      Thanks again for allowing me to participate in care of this patient. Please call me if you have any questions. With best regards. Olegario Persaud MD, 4/12/2022 1:11 PM     Please note this report has been partially produced using speech recognition software and may contain errors related to that system including errors in grammar, punctuation, and spelling, as well as words and phrases that may be inappropriate. If there are any questions or concerns please feel free to contact the dictating provider for clarification.

## 2022-04-12 NOTE — PROGRESS NOTES
PHARMACY ANTICOAGULATION MONITORING SERVICE    Bhupendra Conteh is a 80 y.o. male on warfarin therapy for history of DVT. Pharmacy consulted by Dr. Osiris Tillman for monitoring and adjustment of treatment. Indication for anticoagulation: History of DVT  INR goal: 2 - 3  Warfarin dose prior to admission: 2.5 mg po daily    Pertinent Laboratory Values   Recent Labs     04/11/22  1858 04/11/22  2215   INR  --  1.64   HGB 9.1*  --    HCT 30.4*  --      --        Assessment/Plan:  Drug Interactions:   Zithromax IV (for pneumonia)  Lovenox (Prophylactic dose)  Tylenol (PRN)  INR subtherapeutic on admission at 1.64  Will give Warfarin 3 mg today, then resume Warfarin 2.5 mg po daily; Trend INR daily  Pharmacy will continue to monitor and adjust warfarin therapy as indicated    Thank you for the consult.   Pamela Garcia, [facility]  4/12/2022 6:16 AM

## 2022-04-12 NOTE — ED NOTES
Pt's pulse dropped to low 40's to mid-30's after ambulating to bathroom. BP also dropped to 305 systolic; oxygen saturation was in the mid- to high-80's. Pt has poor circulation in right hand; pulse ox probe was switched to left hand; oxygen increased to 6 lpm. RN came into room while these procedures were being completed. Pt's pulse, oxygen saturation, and blood pressure increased. Pt reports that he felt \"sick\" and dizzy, like he was going to pass out. Pt states that this happens frequently when he goes to the bathroom. Concerns for vagal episodes when pt goes to the bathroom voiced to RN.     Pj Rollins, GISELL Quintana  04/11/22 2121

## 2022-04-12 NOTE — SIGNIFICANT EVENT
60-year-old male with a history of diabetes, DVT on Coumadin, SP cardiac artery endarterectomy on right side, pulmonary nodule and a pleural effusion was admitted for syncope and collapse. Patient was found bradycardia and low blood pressure along with respiratory failure this morning. Patient had thoracentesis this morning. Around 1 L clear yellowish liquid was removed by IR. Patient's blood pressure has no response to IV fluids. Patient was transferred to ICU for Levophed drip. Cardiology continue following up. Dr. Subha Horowitz performed Limited echocardiogram and found out right heart strain. Concerning of PE. Dr. Subha Horowitz started full dose of Lovenox and ordered V/Q study. Give report to Late physician Dr. Allan Handing to cover 5 pm -7 pm today.

## 2022-04-12 NOTE — PROCEDURES
PROCEDURE PERFORMED:  Left Thoracentesis    PRIMARY INDICATION FOR PROCEDURE: Pleural Fluid    INFORMED CONSENT:  Obtained prior to procedure. Consent placed in chart. PT TRANSPORTED FROM:    3021             TO THE IR ROOM:  Small    PT ARRIVED TO IR ROOM AT:  0830                    ASSESSMENT:  Pt alert and oriented x4. Pt verbalizes understanding of procedure. BARRIER PRECAUTIONS & STERILE TECHNIQUE:               Pt remains in bed and positioned on right side for procedure. Warm blankets given. Pt placed on Vital Signs Monitor. Pt prepped and draped in a sterile fashion with chlorhexadine. TIME OUT AT:  [address]    Name, , allergies, labs, code status and procedure reviewed during time out. PAIN/LOCAL ANESTHESIA/SEDATION MANAGEMENT:           Local: Lidocaine 1% given by Dr Baez Hoffman:           ACCESS TIME: 0848          US/FLUORO:  4 US images          WIRE USED:           SHEATH USED:           CATHETER USED: One Step          FINAL IMAGE TAKEN TO CONFIRM PLACEMENT OF:           CONTRAST/CC:     A total of 980 cc clear yellow pleural fluid removed.     STERILE DRESSINGS: 4x4 gauze and tegaderm    SPECIMENS: 180 cc clear yellow pleural fluid sent to lab    EBL:   Less than 1 cc       FOLLOW- UP X-RAY:  ordered    COMPLICATIONS: None    STAFF IN ROOM: Dr. Cantu Dears RN    REPORT CALLED TO: Samir Anne RN on Unit    PT LEFT IR ROOM AT:  1034

## 2022-04-12 NOTE — PROGRESS NOTES
Pt arrive to room 2108 from 3N. Pt alert and oriented. Two person skin assessment completed with Maxx Maldonado. Pt coccyx is clean, dry, and intact. No open areas. Scattered bruising and dry flaky skin. Pt belongings include clothing and slippers. Pt on 5L NC. Pt very grey in color. Dr. Elicia Maciel and Meagan Spine at bedside. ABG order placed. Started on levo at this time. ECHO tech at bedside.

## 2022-04-12 NOTE — PROGRESS NOTES
V2.0  [facility] Hospitalist Progress Note      Name:  Bhupendra Conteh /Age/Sex: 1929  (80 y.o. male)   MRN & CSN:  [id number] & [id number] Encounter Date/Time: 2022 10:59 AM EDT    Location:  [address] [place] [address] PCP: Young Yoder MD       Hospital Day: 2    Assessment and Plan:   Bhupendra Conteh is a 80 y.o.  male with a history of diabetes, DVT, on Coumadin, SP carotid artery endarterectomy of right side, recently was admitted to hospital from 2021 to 2021 for acute respiratory failure, 13 mm pulmonary nodule and pleural effusion who presents with Syncope and collapse     Syncope and collapse:  Bradycardia 54s  Hypotension 60s/30s   patient developing episodes of bradycardia with heart rate 50s, and low blood pressure  on telemetry  Initial EKG showed a junctional rhythm, will repeat  Troponin elevated to 0.138 this morning, baseline 0.03-0.05  Appreciate cardiology input: Patient declined permanent pacemaker implantation at this time, started low-dose of theophylline to increase his heart rate, echocardiogram ordered. cardiology continue follow-up  Pt will be transferred to ICU due to unstable vitals, Levophad drip started. 4.    Acute on chronic respiratory failure  Secondary to the following 2 reasons  Baseline 2 L NC since 2021, currently on 6 L NC  Oxygen panel, management as below    5. Left multifocal pneumonia:  CT chest shows large left pleural effusion and large area of consolidation involving the left mid to lower lung, concerning for airspace disease   continue broad-spectrum coverage IV ceftriaxone and Zithromax  Continue oxygen supplementation  Pulmonary consult     6.  Large Left pleural effusion  May be malignant, or may be reactive from pneumonia  He was found to have a 13 mm spiculated nodular density in the left upper lobe in 2021. Pleural fluid cytology at that time was negative for malignancy.   Appreciate IR input, had thoracentesis this morning, around 1 L clear yellow fluids removed    Other comorbidities  7. Chronic kidney disease stage IV: Creatinine 2.1 stable  8. Type 2 diabetes mellitus: Placed on medium dose sliding scale  9. History of DVT, on anticoagulation with Coumadin. INR subtherapeutic  Pharmacy to dose Coumadin           DVT Prophylaxis: coumadin  CODE STATUS: DNR-CCA     Discussed the case with Dr. Kohli Barrier DIET; Regular   DVT Prophylaxis [] Lovenox, []  Heparin, [] SCDs, [] Ambulation,  [] Eliquis, [] Xarelto  [x] Coumadin   Code Status DNR-CCA   Disposition From: home  Expected Disposition: possible SNF  Estimated Date of Discharge: pending  Patient requires continued admission due to medical condition   Surrogate Decision Maker/ POA      Subjective:     Chief Complaint: Dizziness and Other (Possible seizure like activity)       Gonzalo Schaefer is a 80 y.o. male who presents with dizziness, syncope and collapse. Patient was found bradycardia and low blood pressure. Cardiology pulmonology is on board. Patient had thoracentesis this morning. Around 1 L clear yellow pleural fluids removed by IR. Patient has improved breathing. Try to taper down oxygen from 6 L to 5 L. Patient's blood pressure is still low. Started IV fluids with 500 ml bolus and infusion 100 ml/hr. Pt has no response to IV fluids. BP was still low at 60s/30s. Levophad started. Patient will be transferred to ICU. Review of Systems:    Review of Systems   Constitutional: Positive for fatigue. HENT: Negative. Eyes: Negative. Respiratory: Positive for shortness of breath. Cardiovascular: Negative. Gastrointestinal: Negative. Endocrine: Negative. Genitourinary: Negative. Musculoskeletal: Negative. Skin: Negative. Allergic/Immunologic: Negative. Neurological: Negative. Hematological: Negative. Psychiatric/Behavioral: Negative. Objective:        Intake/Output Summary (Last 24 hours) at 4/12/2022 [facility] filed at 4/12/2022 5912  Gross per 24 hour   Intake --   Output 400 ml   Net -400 ml        Vitals:   Vitals:    04/12/22 1033   BP: (!) 71/46   Pulse: 53   Resp: 20   Temp:    SpO2: 96%       Physical Exam:     General: NAD  Eyes: EOMI  ENT: neck supple  Cardiovascular: Bradycardia  Respiratory: Diminished breathing sound in left or lower lung  Gastrointestinal: Soft, non tender  Genitourinary: no suprapubic tenderness  Musculoskeletal: No edema  Skin: warm, dry  Neuro: Alert. Psych: Mood appropriate.      Medications:   Medications:    sodium chloride flush  5-40 mL IntraVENous 2 times per day    enoxaparin  30 mg SubCUTAneous Daily    cefTRIAXone (ROCEPHIN) IV  1,000 mg IntraVENous Q24H    insulin lispro  0-12 Units SubCUTAneous TID WC    insulin lispro  0-6 Units SubCUTAneous Nightly    azithromycin  500 mg IntraVENous Q24H    [START ON 4/13/2022] warfarin  2.5 mg Oral Daily    warfarin  3 mg Oral Once    theophylline  100 mg Oral Daily      Infusions:    sodium chloride      dextrose      sodium chloride 100 mL/hr at 04/12/22 1030     PRN Meds: sodium chloride flush, 5-40 mL, PRN  sodium chloride, , PRN  ondansetron, 4 mg, Q8H PRN   Or  ondansetron, 4 mg, Q6H PRN  polyethylene glycol, 17 g, Daily PRN  acetaminophen, 650 mg, Q6H PRN   Or  acetaminophen, 650 mg, Q6H PRN  glucose, 15 g, PRN  glucagon (rDNA), 1 mg, PRN  dextrose, 100 mL/hr, PRN  dextrose bolus (hypoglycemia), 125 mL, PRN   Or  dextrose bolus (hypoglycemia), 250 mL, PRN        Labs      Recent Results (from the past 24 hour(s))   CBC with Auto Differential    Collection Time: 04/11/22  6:58 PM   Result Value Ref Range    WBC 6.1 4.0 - 10.5 K/CU MM    RBC 3.12 (L) 4.6 - 6.2 M/CU MM    Hemoglobin 9.1 (L) 13.5 - 18.0 GM/DL    Hematocrit 30.4 (L) 42 - 52 %    MCV 97.4 78 - 100 FL    MCH 29.2 27 - 31 PG    MCHC 29.9 (L) 32.0 - 36.0 %    RDW 13.8 11.7 - 14.9 %    Platelets 444 804 - 984 K/CU MM    MPV 11.0 7.5 - 11.1 FL Differential Type AUTOMATED DIFFERENTIAL     Segs Relative 54.1 36 - 66 %    Lymphocytes % 31.7 24 - 44 %    Monocytes % 6.1 (H) 0 - 4 %    Eosinophils % 6.4 (H) 0 - 3 %    Basophils % 1.2 (H) 0 - 1 %    Segs Absolute 3.3 K/CU MM    Lymphocytes Absolute 1.9 K/CU MM    Monocytes Absolute 0.4 K/CU MM    Eosinophils Absolute 0.4 K/CU MM    Basophils Absolute 0.1 K/CU MM    Nucleated RBC % 0.0 %    Total Nucleated RBC 0.0 K/CU MM    Total Immature Neutrophil 0.03 K/CU MM    Immature Neutrophil % 0.5 (H) 0 - 0.43 %   Comprehensive Metabolic Panel    Collection Time: 04/11/22  6:58 PM   Result Value Ref Range    Sodium 133 (L) 135 - 145 MMOL/L    Potassium 4.2 3.5 - 5.1 MMOL/L    Chloride 99 99 - 110 mMol/L    CO2 22 21 - 32 MMOL/L    BUN 19 6 - 23 MG/DL    CREATININE 2.1 (H) 0.9 - 1.3 MG/DL    Glucose 217 (H) 70 - 99 MG/DL    Calcium 8.4 8.3 - 10.6 MG/DL    Albumin 3.4 3.4 - 5.0 GM/DL    Total Protein 7.1 6.4 - 8.2 GM/DL    Total Bilirubin 0.1 0.0 - 1.0 MG/DL    ALT 8 (L) 10 - 40 U/L    AST 13 (L) 15 - 37 IU/L    Alkaline Phosphatase 90 40 - 129 IU/L    GFR Non-African American 30 (L) >60 mL/min/1.73m2    GFR  36 (L) >60 mL/min/1.73m2    Anion Gap 12 4 - 16   Troponin    Collection Time: 04/11/22  6:58 PM   Result Value Ref Range    Troponin T 0.020 (H) <0.01 NG/ML   D-Dimer, Quantitative    Collection Time: 04/11/22  6:58 PM   Result Value Ref Range    D-Dimer, Quant 709 (H) <230 NG/mL(DDU)   Urinalysis with Microscopic    Collection Time: 04/11/22  8:42 PM   Result Value Ref Range    Color, UA YELLOW YELLOW    Clarity, UA CLEAR CLEAR    Glucose, Urine >1,000 (A) NEGATIVE MG/DL    Bilirubin Urine NEGATIVE NEGATIVE MG/DL    Ketones, Urine NEGATIVE NEGATIVE MG/DL    Specific Gravity, UA 1.015 1.001 - 1.035    Blood, Urine SMALL (A) NEGATIVE    pH, Urine 6.0 5.0 - 8.0    Protein, UA 30 (A) NEGATIVE MG/DL    Urobilinogen, Urine NORMAL 0.2 - 1.0 MG/DL    Nitrite Urine, Quantitative NEGATIVE NEGATIVE Leukocyte Esterase, Urine NEGATIVE NEGATIVE    RBC, UA <1 0 - 3 /HPF    WBC, UA NONE SEEN 0 - 2 /HPF    Bacteria, UA RARE (A) NEGATIVE /HPF    Squam Epithel, UA <1 /HPF    Mucus, UA RARE (A) NEGATIVE HPF    Hyaline Casts, UA 2 /LPF    Granular Casts, UA 4 /LPF   EKG 12 Lead    Collection Time: 04/11/22 10:09 PM   Result Value Ref Range    Ventricular Rate 65 BPM    Atrial Rate 64 BPM    QRS Duration 96 ms    Q-T Interval 398 ms    QTc Calculation (Bazett) 413 ms    R Axis 2 degrees    T Axis -1 degrees    Diagnosis       Junctional rhythm  Minimal voltage criteria for LVH, may be normal variant  Abnormal ECG  When compared with ECG of 20-DEC-2021 01:12,  Junctional rhythm has replaced Sinus rhythm  Inverted T waves have replaced nonspecific T wave abnormality in Inferior leads     Protime/INR & PTT    Collection Time: 04/11/22 10:15 PM   Result Value Ref Range    Protime 21.3 (H) 11.7 - 14.5 SECONDS    INR 1.64 INDEX    aPTT 31.4 25.1 - 37.1 SECONDS   Procalcitonin    Collection Time: 04/12/22 12:50 AM   Result Value Ref Range    Procalcitonin 0.065    Lactic Acid    Collection Time: 04/12/22 12:50 AM   Result Value Ref Range    Lactate 1.2 0.4 - 2.0 mMOL/L   EKG 12 Lead    Collection Time: 04/12/22  6:58 AM   Result Value Ref Range    Ventricular Rate 47 BPM    Atrial Rate 61 BPM    QRS Duration 94 ms    Q-T Interval 466 ms    QTc Calculation (Bazett) 412 ms    R Axis -5 degrees    T Axis 32 degrees    Diagnosis       Undetermined rhythm  Inferior infarct , possibly acute  ** ** ACUTE MI / STEMI ** **  Consider right ventricular involvement in acute inferior infarct  Abnormal ECG  When compared with ECG of 11-APR-2022 22:09,  Current undetermined rhythm precludes rhythm comparison, needs review  Inferior infarct is now present  ST elevation now present in Inferior leads  ST now depressed in Lateral leads     Hemoglobin A1c    Collection Time: 04/12/22  8:12 AM   Result Value Ref Range    Hemoglobin A1C 7.6 (H) 4.2 - 6.3 %    eAG 171 mg/dL   Troponin    Collection Time: 04/12/22  8:12 AM   Result Value Ref Range    Troponin T 0.138 (HH) <0.01 NG/ML   Protime-INR    Collection Time: 04/12/22  8:12 AM   Result Value Ref Range    Protime 22.1 (H) 11.7 - 14.5 SECONDS    INR 1.70 INDEX   PLEURAL/PERITONEAL FLUID PANEL    Collection Time: 04/12/22  8:30 AM   Result Value Ref Range    Glucose, Fluid 228 >60 MG/DL    LD, Fluid 85 <200 IU/L    Protein, Fluid 4.3 G/DL        Imaging/Diagnostics Last 24 Hours   CT HEAD WO CONTRAST    Result Date: 4/11/2022  EXAMINATION: CT OF THE HEAD WITHOUT CONTRAST  4/11/2022 11:31 pm TECHNIQUE: CT of the head was performed without the administration of intravenous contrast. Dose modulation, iterative reconstruction, and/or weight based adjustment of the mA/kV was utilized to reduce the radiation dose to as low as reasonably achievable. COMPARISON: 02/09/2022 HISTORY: ORDERING SYSTEM PROVIDED HISTORY: syncope TECHNOLOGIST PROVIDED HISTORY: Reason for exam:->syncope Has a \"code stroke\" or \"stroke alert\" been called? ->No Decision Support Exception - unselect if not a suspected or confirmed emergency medical condition->Emergency Medical Condition (MA) Reason for Exam: syncope FINDINGS: BRAIN/VENTRICLES: There is no acute intracranial hemorrhage, mass effect or midline shift. No abnormal extra-axial fluid collection. The gray-white differentiation is maintained without evidence of an acute infarct. There is no evidence of hydrocephalus. Generalized age-appropriate cortical atrophy. No basilar cistern or sulcal effacement. Intracranial atherosclerosis is identified. No focal wedge-shaped area of acute ischemia is identified. No intracranial definite mass is identified. ORBITS: The visualized portion of the orbits demonstrate no acute abnormality. SINUSES: Mucosal retention cyst or polyp noted in the left maxillary sinus. Minimal leftward nasal septal deviation and small spur.   No air-fluid levels in the paranasal sinuses. Opacification of the left mastoid air cells, unchanged from the previous examination. Fluid noted within the middle ear cavity as well. SOFT TISSUES/SKULL:  No acute soft tissue abnormality is identified. Calcifications seen in the transverse ligament. No calvarial fracture is identified. Chronic age-appropriate generalized cortical atrophy with microvascular chronic ischemic change. Similar appearing left mastoid effusion, with fluid noted in the left middle ear cavity. XR CHEST PORTABLE    Result Date: 4/12/2022  EXAMINATION: ONE XRAY VIEW OF THE CHEST 4/12/2022 10:02 am COMPARISON: None. HISTORY: ORDERING SYSTEM PROVIDED HISTORY: post left thoracentesis TECHNOLOGIST PROVIDED HISTORY: Reason for exam:->post left thoracentesis Reason for Exam: post left thoracentesis FINDINGS: There is a small left basilar infiltrate and effusion. Mild cardiomegaly. Osseous structures are unremarkable. There is a minimal trace left effusion. There is no pneumothorax identified. Small left basilar pneumonia. Trace effusion. No pneumothorax     XR CHEST PORTABLE    Result Date: 4/11/2022  EXAMINATION: ONE XRAY VIEW OF THE CHEST 4/11/2022 9:07 pm COMPARISON: Chest x-ray December 25, 2021; CT chest December 21, 2021 HISTORY: ORDERING SYSTEM PROVIDED HISTORY: syncope, recent pleural effusion s/p thora TECHNOLOGIST PROVIDED HISTORY: Reason for exam:->syncope, recent pleural effusion s/p thora Reason for Exam: dizziness, other Additional signs and symptoms: syncope, recent pleural effusion, s/p thora FINDINGS: Cardiac silhouette is enlarged but grossly stable in appearance. Moderate-sized left effusion and associated atelectasis. Suspect trace right effusion and atelectasis. No pneumothorax. Central pulmonary vascular congestion present with no overt pulmonary edema. 1. Central pulmonary vascular congestion with moderate left and likely trace right effusion and associated atelectasis. IR GUIDED THORACENTESIS PLEURAL    Result Date: 4/12/2022  PROCEDURE: ULTRASOUNDGUIDED left THORACENTESIS 4/12/2022 HISTORY: ORDERING SYSTEM PROVIDED HISTORY: large left pleural effusion TECHNOLOGIST PROVIDED HISTORY: Reason for exam:->large left pleural effusion Which side should the procedure be performed? ->Left TECHNIQUE: Maximum sterile barrier technique including hand hygiene, skin prep and sterile ultrasound technique utilized for procedure. Sterile ultrasound technique also utilized for procedure Informed consent was obtained after a detailed explanation of the procedure including risks, benefits, and alternatives. Universal protocol was performed. The left chest was prepped and draped in sterile fashion and local anesthesia was achieved with lidocaine. An 5 [place] needle sheath was advanced under ultrasound guidance into pleural effusion and thoracentesis was performed. The patient tolerated the procedure well. FINDINGS: Pre and intraprocedural images demonstrates left pleural fluid with the thoracentesis catheter within it and underlying pulmonary consolidation. A total of 980 mL clear yellow/serous appearing fluid removed from left pleural space. Postprocedure chest radiograph ordered. No complication suggested     Successful ultrasound guided left thoracentesis with specimen sent for laboratory evaluation.        Electronically signed by Mack Ramsey CNP on 4/12/2022 at 11:31 AM

## 2022-04-12 NOTE — PROGRESS NOTES
Bladder scan complete. Pt has zero in at this time. States he has not urinated all day. Perfect serve sent to Dr. [facility]. States to continue current management.

## 2022-04-12 NOTE — PROGRESS NOTES
Who shows RV strain high likelihood of pulmonary embolism  Will start the patient on high-dose Lovenox 1 mg sq bid  Get vq scan  Please note that the CTA pulmonary was negative yesterday

## 2022-04-12 NOTE — CONSULTS
packet 17 g, Daily PRN  acetaminophen (TYLENOL) tablet 650 mg, Q6H PRN   Or  acetaminophen (TYLENOL) suppository 650 mg, Q6H PRN  glucose (GLUTOSE) 40 % oral gel 15 g, PRN  glucagon (rDNA) injection 1 mg, PRN  dextrose 5 % solution, PRN  dextrose bolus (hypoglycemia) 10% 125 mL, PRN   Or  dextrose bolus (hypoglycemia) 10% 250 mL, PRN  cefTRIAXone (ROCEPHIN) 1000 mg IVPB in 50 mL D5W minibag, Q24H  insulin lispro (HUMALOG) injection vial 0-12 Units, TID WC  insulin lispro (HUMALOG) injection vial 0-6 Units, Nightly  azithromycin (ZITHROMAX) 500 mg in dextrose 5 % 250 mL IVPB, Q24H  [START ON 4/13/2022] warfarin (COUMADIN) tablet 2.5 mg, Daily  warfarin (COUMADIN) tablet 3 mg, Once      Current Facility-Administered Medications   Medication Dose Route Frequency Provider Last Rate Last Admin    sodium chloride flush 0.9 % injection 5-40 mL  5-40 mL IntraVENous 2 times per day Tl Hernandez MD        sodium chloride flush 0.9 % injection 5-40 mL  5-40 mL IntraVENous PRN Tl Hernandez MD        0.9 % sodium chloride infusion   IntraVENous PRN Tl Hernandez MD        enoxaparin (LOVENOX) injection 30 mg  30 mg SubCUTAneous Daily Tl Hernandez MD        ondansetron (ZOFRAN-ODT) disintegrating tablet 4 mg  4 mg Oral Q8H PRN Tl Hernandez MD        Or    ondansetron (ZOFRAN) injection 4 mg  4 mg IntraVENous Q6H PRN Tl Hernandez MD        polyethylene glycol (GLYCOLAX) packet 17 g  17 g Oral Daily PRN Tl Hernandez MD        acetaminophen (TYLENOL) tablet 650 mg  650 mg Oral Q6H PRN Tl Hernandez MD        Or    acetaminophen (TYLENOL) suppository 650 mg  650 mg Rectal Q6H PRN Tl Hernandez MD        glucose (GLUTOSE) 40 % oral gel 15 g  15 g Oral PRN Mounika García, APRMARY - CNP        glucagon (rDNA) injection 1 mg  1 mg IntraMUSCular PRN Mounika García APRMARY - CNP        dextrose 5 % solution  100 mL/hr IntraVENous PRN WHITLEY Batres - CNP        dextrose bolus (hypoglycemia) 10% 125 mL  125 mL IntraVENous PRN Mounika WHITLEY Mariano CNP        Or    dextrose bolus (hypoglycemia) 10% 250 mL  250 mL IntraVENous PRN WHITLEY Batres - CNP        cefTRIAXone (ROCEPHIN) 1000 mg IVPB in 50 mL D5W minibag  1,000 mg IntraVENous Q24H Ángel Muñiz MD   Stopped at 04/12/22 0639    insulin lispro (HUMALOG) injection vial 0-12 Units  0-12 Units SubCUTAneous TID WC Ángel Muñiz MD        insulin lispro (HUMALOG) injection vial 0-6 Units  0-6 Units SubCUTAneous Nightly Ángel Muñiz MD        azithromycin (ZITHROMAX) 500 mg in dextrose 5 % 250 mL IVPB  500 mg IntraVENous Q24H Ángel Muñiz  mL/hr at 04/12/22 0601 500 mg at 04/12/22 0601    [START ON 4/13/2022] warfarin (COUMADIN) tablet 2.5 mg  2.5 mg Oral Daily Ángel Muñiz MD        warfarin (COUMADIN) tablet 3 mg  3 mg Oral Once Ángel Muñiz MD         Review of Systems:  All 14 systems reviewed, all negative except for syncope    Physical Examination:    BP (!) 81/54   Pulse (!) 46   Temp 97.6 °F (36.4 °C) (Oral)   Resp 16   SpO2 96%      Wt Readings from Last 3 Encounters:   12/26/21 167 lb (75.8 kg)   05/12/21 186 lb (84.4 kg)   03/01/21 190 lb (86.2 kg)     There is no height or weight on file to calculate BMI. General Appearance: Fair  Head: normocephalic     Eyes: normal, noninjected conjunctiva    ENT: normal mucosa, noninjected throat, normal     NECK: No JVP  No thyromegaly        Cardiovascular: No thrills palpated   Auscultation: Normal S1 and S2, no murmur   carotid bruit no   Abdominal Aorta no bruit    Respiratory:    Breath sounds Diminshed bilaterally     Extremities:  1+ Edema clubbing ,   no cyanosis    SKIN: Warm and well perfused, no pallor or cyanosis    Vascular exam:  Pedal Pulses: palp  bilaterally        Abdomen:  No masses or tenderness. No organomegaly noted. Neurological:  Oriented to time, place, and person   No focal neurological deficit noted.   Psychiatric:normal mood, no anxiety    Lab Review   Recent Results (from the past 24 hour(s))   CBC with Auto Differential    Collection Time: 04/11/22  6:58 PM   Result Value Ref Range    WBC 6.1 4.0 - 10.5 K/CU MM    RBC 3.12 (L) 4.6 - 6.2 M/CU MM    Hemoglobin 9.1 (L) 13.5 - 18.0 GM/DL    Hematocrit 30.4 (L) 42 - 52 %    MCV 97.4 78 - 100 FL    MCH 29.2 27 - 31 PG    MCHC 29.9 (L) 32.0 - 36.0 %    RDW 13.8 11.7 - 14.9 %    Platelets 017 799 - 422 K/CU MM    MPV 11.0 7.5 - 11.1 FL    Differential Type AUTOMATED DIFFERENTIAL     Segs Relative 54.1 36 - 66 %    Lymphocytes % 31.7 24 - 44 %    Monocytes % 6.1 (H) 0 - 4 %    Eosinophils % 6.4 (H) 0 - 3 %    Basophils % 1.2 (H) 0 - 1 %    Segs Absolute 3.3 K/CU MM    Lymphocytes Absolute 1.9 K/CU MM    Monocytes Absolute 0.4 K/CU MM    Eosinophils Absolute 0.4 K/CU MM    Basophils Absolute 0.1 K/CU MM    Nucleated RBC % 0.0 %    Total Nucleated RBC 0.0 K/CU MM    Total Immature Neutrophil 0.03 K/CU MM    Immature Neutrophil % 0.5 (H) 0 - 0.43 %   Comprehensive Metabolic Panel    Collection Time: 04/11/22  6:58 PM   Result Value Ref Range    Sodium 133 (L) 135 - 145 MMOL/L    Potassium 4.2 3.5 - 5.1 MMOL/L    Chloride 99 99 - 110 mMol/L    CO2 22 21 - 32 MMOL/L    BUN 19 6 - 23 MG/DL    CREATININE 2.1 (H) 0.9 - 1.3 MG/DL    Glucose 217 (H) 70 - 99 MG/DL    Calcium 8.4 8.3 - 10.6 MG/DL    Albumin 3.4 3.4 - 5.0 GM/DL    Total Protein 7.1 6.4 - 8.2 GM/DL    Total Bilirubin 0.1 0.0 - 1.0 MG/DL    ALT 8 (L) 10 - 40 U/L    AST 13 (L) 15 - 37 IU/L    Alkaline Phosphatase 90 40 - 129 IU/L    GFR Non-African American 30 (L) >60 mL/min/1.73m2    GFR  36 (L) >60 mL/min/1.73m2    Anion Gap 12 4 - 16   Troponin    Collection Time: 04/11/22  6:58 PM   Result Value Ref Range    Troponin T 0.020 (H) <0.01 NG/ML   D-Dimer, Quantitative    Collection Time: 04/11/22  6:58 PM   Result Value Ref Range    D-Dimer, Quant 709 (H) <230 NG/mL(DDU)   Urinalysis with Microscopic    Collection Time: 04/11/22  8:42 PM   Result Value Ref Range    Color, UA YELLOW YELLOW    Clarity, UA CLEAR CLEAR    Glucose, Urine >1,000 (A) NEGATIVE MG/DL    Bilirubin Urine NEGATIVE NEGATIVE MG/DL    Ketones, Urine NEGATIVE NEGATIVE MG/DL    Specific Gravity, UA 1.015 1.001 - 1.035    Blood, Urine SMALL (A) NEGATIVE    pH, Urine 6.0 5.0 - 8.0    Protein, UA 30 (A) NEGATIVE MG/DL    Urobilinogen, Urine NORMAL 0.2 - 1.0 MG/DL    Nitrite Urine, Quantitative NEGATIVE NEGATIVE    Leukocyte Esterase, Urine NEGATIVE NEGATIVE    RBC, UA <1 0 - 3 /HPF    WBC, UA NONE SEEN 0 - 2 /HPF    Bacteria, UA RARE (A) NEGATIVE /HPF    Squam Epithel, UA <1 /HPF    Mucus, UA RARE (A) NEGATIVE HPF    Hyaline Casts, UA 2 /LPF    Granular Casts, UA 4 /LPF   EKG 12 Lead    Collection Time: 04/11/22 10:09 PM   Result Value Ref Range    Ventricular Rate 65 BPM    Atrial Rate 64 BPM    QRS Duration 96 ms    Q-T Interval 398 ms    QTc Calculation (Bazett) 413 ms    R Axis 2 degrees    T Axis -1 degrees    Diagnosis       Junctional rhythm  Minimal voltage criteria for LVH, may be normal variant  Abnormal ECG  When compared with ECG of 20-DEC-2021 01:12,  Junctional rhythm has replaced Sinus rhythm  Inverted T waves have replaced nonspecific T wave abnormality in Inferior leads     Protime/INR & PTT    Collection Time: 04/11/22 10:15 PM   Result Value Ref Range    Protime 21.3 (H) 11.7 - 14.5 SECONDS    INR 1.64 INDEX    aPTT 31.4 25.1 - 37.1 SECONDS   Procalcitonin    Collection Time: 04/12/22 12:50 AM   Result Value Ref Range    Procalcitonin 0.065    Lactic Acid    Collection Time: 04/12/22 12:50 AM   Result Value Ref Range    Lactate 1.2 0.4 - 2.0 mMOL/L           Assessment/Recommendations:     -Syncope possibly secondary to bradycardia patient might of sick sinus syndrome  I discussed with the patient regarding the need of possible permanent pacemaker implantation however the patient at present is not interested and says that he is ready to meet his [facility] and his wife, patient is [facility] hence we will continue with the conservative management I will put him on a low-dose theophylline to increase his heart rate    8;20 am After the patient's family came patient is undecided now he is thinking about getting a pacemaker hence will consult electrophysiology for further work-up    -Anemia hemoglobin is 6.1 which is around his baseline    -DVT patient is on Coumadin which will be continued    -Patient also has left multifocal pneumonia being managed by primary team    -Pleural effusion per notes may be malignant and might need repeat thoracentesis if the patient agrees    -Patient is DNR CCA and is not interested in any aggressive management at the present time       Yogi Acosta MD, 4/12/2022 6:53 AM       Please note this report has been partially produced using speech recognition software and may contain errors related to that system including errors in grammar, punctuation, and spelling, as well as words and phrases that may be inappropriate. If there are any questions or concerns please feel free to contact the dictating provider for clarification.

## 2022-04-12 NOTE — PROGRESS NOTES
Patient examined at bedside for intermittent low HR to 30-45bpm. BP 82/48, map 60, spo2 98% on 3L, On 2 L O2 baseline. Denies sob, cp, n/v, dizziness and lightheadedness. The patient is completely oriented with good memory. He stated that if his heart stops or he gets bad, he just wants to be comfortable. He states he does not want intubation, defibrillation, chest compressions or resuscitative medications if his heart stops or breathing gets bad. Nurse [facility] at bedside and witnessed this discussion. Code status changed to DNR-CCA.

## 2022-04-12 NOTE — PROGRESS NOTES
Pt admitted to [address], upon admission questions pt states that he does not wish to be a full code, and states \" just let me go to be with the Scott Ledezma and my wife. \" This RN educated, states that he does not wish for compressions or \"life support\" and wishes to be \"comfortable. \" This RN called this pt primary contact and son, Lacy Dixon and this pt son agrees, wishes to change code status. Pt is A&Ox4 at this time, states no compressions, no shock, no medication, or life saving measures. This pt HR is ranging from 38-50 at this time. [address] notified.  Electronically signed by Edi Waggoner RN on 4/12/2022 at 2:11 AM

## 2022-04-13 NOTE — PROGRESS NOTES
Hospitalist Progress Note      Name:  Rosebud Meckel /Age/Sex: 1929  (80 y.o. male)   MRN & CSN:  [id number] & [id number] Admission Date/Time: 2022  6:47 PM   Location:  -A PCP: Jeaneth Zuñiga MD         Hospital Day: 3    Assessment and Plan:   Rosebud Meckel is a 80 y.o.  male  who presents with Syncope and collapse     1) Syncope and collapse  -Concerning for cardiogenic etiology  -Head CT unremarkable   -EKG: Significant for junctional rhythm  -TTE with EF of 55-60% as well as signs of Rt ventricular volume/pressure overload and positive Dai sign  -CTA chest: No PE  -Cardiology on board; seems patient wants conservative care. Plans to discuss with son    2) Acute on Chronic Hypoxic Respiratory Failure  -Likely due to pleural effusion and possible PNA  -Wears 2 L of O2 at baseline; wean to baseline   -S/P IR thoracocentesis with drainage of 980cc of fluid  -Follow cx, cytology not sent unfortunately   -Continue incentive spirometry     3)  Left multifocal pneumonia:  -CT chest shows large left pleural effusion and large area of consolidation involving the left mid to lower lung, concerning for airspace disease  -Continue IV Rocephin and Zithromax    4) YASMEEN on CKD 4   -Now concerning SABA as patient received IV contrast  -Avoid nephrotoxic medications   -Nephrology on board; patient currently declined RRT  -Awaiting his discussion with his son  -Continue medical management for now    5) Acute on Chronic HFpEF  -Diuresis per nephrology  -Continue to monitor     Other chronic medical conditions, medication resumed unless contraindicated    DM type II  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      Diet ADULT DIET;  Regular   DVT Prophylaxis [] Lovenox, []  Heparin, [] SCDs, [] Ambulation   GI Prophylaxis [] PPI,  [] H2 Blocker,  [] Carafate,  [] Diet/Tube Feeds   Code Status DNR-CCA   Disposition Home   MDM      History of Present Illness:     Patient was seen and examined  Reported some nausea, no vomiting  No chest pain or abdominal pain  No fever, chills    Ten point ROS reviewed negative, unless as noted above    Objective: Intake/Output Summary (Last 24 hours) at 4/13/2022 1350  Last data filed at 4/13/2022 0644  Gross per 24 hour   Intake 1488.74 ml   Output 65 ml   Net 1423.74 ml      Vitals:   Vitals:    04/13/22 0621   BP:    Pulse: 76   Resp: 22   Temp:    SpO2: 100%     Physical Exam:   GEN Awake male, sitting upright in bed in no apparent distress. Appears given age. EYES Pupils are equally round. No scleral erythema, discharge, or conjunctivitis. HENT Mucous membranes are moist. Oral pharynx without exudates, no evidence of thrush. NECK Supple, no apparent thyromegaly or masses. RESP Clear to auscultation, no wheezes, rales or rhonchi. Symmetric chest movement while on 4L of O2.  CARDIO/VASC S1/S2 auscultated. Regular rate without appreciable murmurs, rubs, or gallops. No JVD or carotid bruits. Peripheral pulses equal bilaterally and palpable. No peripheral edema. GI Abdomen is soft without significant tenderness, masses, or guarding. Bowel sounds are normoactive. Rectal exam deferred.  No costovertebral angle tenderness. Villa catheter is not present. HEME/LYMPH No palpable cervical lymphadenopathy and no hepatosplenomegaly. No petechiae or ecchymoses. MSK No gross joint deformities. SKIN Normal coloration, warm, dry. NEURO Cranial nerves appear grossly intact, normal speech, no lateralizing weakness. PSYCH Awake, alert, oriented x 4. Affect appropriate.     Medications:   Medications:    bumetanide  2 mg IntraVENous Q8H    warfarin  0.5 mg Oral Daily    gabapentin  300 mg Oral BID    sodium bicarbonate  50 mEq IntraVENous Once    sodium chloride flush  5-40 mL IntraVENous 2 times per day    cefTRIAXone (ROCEPHIN) IV  1,000 mg IntraVENous Q24H    insulin lispro  0-12 Units SubCUTAneous TID WC  insulin lispro  0-6 Units SubCUTAneous Nightly    azithromycin  500 mg IntraVENous Q24H    theophylline  100 mg Oral Daily    lidocaine PF  5 mL IntraDERmal Once    sodium chloride flush  5-40 mL IntraVENous 2 times per day      Infusions:    sodium chloride      dextrose      vasopressin (Septic Shock) infusion Stopped (04/12/22 2119)    sodium chloride      norepinephrine 7 mcg/min (04/13/22 1246)     PRN Meds: promethazine, 6.25 mg, Q6H PRN  oxyCODONE-acetaminophen, 1 tablet, BID PRN  sodium chloride flush, 5-40 mL, PRN  sodium chloride, , PRN  ondansetron, 4 mg, Q8H PRN   Or  ondansetron, 4 mg, Q6H PRN  polyethylene glycol, 17 g, Daily PRN  acetaminophen, 650 mg, Q6H PRN   Or  acetaminophen, 650 mg, Q6H PRN  glucose, 15 g, PRN  glucagon (rDNA), 1 mg, PRN  dextrose, 100 mL/hr, PRN  dextrose bolus (hypoglycemia), 125 mL, PRN   Or  dextrose bolus (hypoglycemia), 250 mL, PRN  sodium chloride flush, 5-40 mL, PRN  sodium chloride, , PRN          Electronically signed by Vasile Tejada MD on 4/13/2022 at 1:50 PM

## 2022-04-13 NOTE — PROGRESS NOTES
CARDIOLOGY  NOTE        Name:  Enid Uriarte /Age/Sex: 1929  (80 y.o. male)   MRN & CSN:  [id number] & [id number] Admission Date/Time: 2022  6:47 PM   Location:  - PCP: Jhon Orourke MD       Hospital Day: 3        PLAN FROM CARDIOLOGY FOR TODAY:   Hold anticoagulation      - cardiology consult is for: Bradycardia    -  Interval history: Had VQ scan done    · ASSESSMENT/ PLAN:  1.     VQ scan negative for PE and swelling go back to prophylactic dose  2. Bradycardia was seen by EP however at present we will continue with conservative management  3. Dilated RV on the echocardiogram however as mentioned above the PE work-up is negative  4. PPM evaluation per family although I have put the patient on the order and the heart rate is much better now          Subjective: Todays complain: Patient mild shortness of breath    HPI:  Lena Hazel is a 80 y. o.year old who and presents with had concerns including Dizziness and Other (Possible seizure like activity). Chief Complaint   Patient presents with    Dizziness    Other     Possible seizure like activity           Objective: Temperature:  Current - Temp: 97.6 °F (36.4 °C);  Max - Temp  Av.5 °F (36.4 °C)  Min: 97.4 °F (36.3 °C)  Max: 97.7 °F (36.5 °C)    Respiratory Rate : Resp  Av.8  Min: 15  Max: 36    Pulse Range: Pulse  Av.7  Min: 51  Max: 83    Blood Presuure Range:  Systolic (54ROA), PVH:124 , Min:56 , SRT:607   ; Diastolic (35KGI), GCL:65, Min:35, Max:122      Pulse ox Range: SpO2  Av %  Min: 82 %  Max: 100 %    24hr I & O:      Intake/Output Summary (Last 24 hours) at 2022 0704  Last data filed at 2022 0644  Gross per 24 hour   Intake 1488.74 ml   Output 65 ml   Net 1423.74 ml         /61   Pulse 76   Temp 97.6 °F (36.4 °C) (Axillary)   Resp 22   Ht 5' 8\" (1.727 m)   Wt 167 lb (75.8 kg)   SpO2 100%   BMI 25.39 kg/m²           Review of Systems:    Mild shortness of    TELEMETRY: Sinus    has a past medical history of CAD (coronary artery disease), Diabetes mellitus ([address] Hyperlipidemia, and Hypertension. has a past surgical history that includes Coronary angioplasty with stent; Carotid endarterectomy (2008); Abdomen surgery; and Inguinal hernia repair (Left). Physical Exam:  General:  Awake, alert, NAD  Head:normal  Eye: Pupils equal and round  Neck:  No JVD, no carotid bruit noted   Chest:  Clear to auscultation, no signs of respiratory distress  Cardiovascular:  Normal rate and rhythm. S1 and S2 noted.  No murmurs rubs or gallops  Abdomen:   nontender  Extremities:  no edema  Pulses; palpable  Neuro: grossly normal    Medications:    bumetanide  2 mg IntraVENous Q8H    sodium chloride flush  5-40 mL IntraVENous 2 times per day    cefTRIAXone (ROCEPHIN) IV  1,000 mg IntraVENous Q24H    insulin lispro  0-12 Units SubCUTAneous TID WC    insulin lispro  0-6 Units SubCUTAneous Nightly    azithromycin  500 mg IntraVENous Q24H    warfarin  2.5 mg Oral Daily    theophylline  100 mg Oral Daily    enoxaparin  1 mg/kg SubCUTAneous BID    lidocaine PF  5 mL IntraDERmal Once    sodium chloride flush  5-40 mL IntraVENous 2 times per day      sodium chloride      dextrose      vasopressin (Septic Shock) infusion Stopped (04/12/22 2119)    sodium chloride      norepinephrine 8 mcg/min (04/13/22 0644)     sodium chloride flush, sodium chloride, ondansetron **OR** ondansetron, polyethylene glycol, acetaminophen **OR** acetaminophen, glucose, glucagon (rDNA), dextrose, dextrose bolus (hypoglycemia) **OR** dextrose bolus (hypoglycemia), sodium chloride flush, sodium chloride    Lab Data:  CBC:   Recent Labs     04/11/22 1858   WBC 6.1   HGB 9.1*   HCT 30.4*   MCV 97.4        BMP:   Recent Labs     04/11/22 1858   *   K 4.2   CL 99   CO2 22   BUN 19   CREATININE 2.1*     LIVER PROFILE:   Recent Labs     04/11/22 1858   AST 13* ALT 8*   BILITOT 0.1   ALKPHOS 90     PT/INR:   Recent Labs     04/11/22 2215 04/12/22  0812 04/13/22 0413   PROTIME 21.3* 22.1* 34.0*   INR 1.64 1.70 2.61     APTT:   Recent Labs     04/11/22 2215   APTT 31.4     BNP:  No results for input(s): BNP in the last 72 hours. TROPONIN: No results for input(s): TROPONINI in the last 72 hours. Recent Labs     04/12/22  0812 04/12/22  1007 04/13/22 0413   TROPONINT 0.138* 0.166* 1.810*        Labs, consult, tests reviewed                    Adolfo Leal MD, PA-C 4/13/2022 7:04 AM     Please note this report has been partially produced using speech recognition software and may contain errors related to that system including errors in grammar, punctuation, and spelling, as well as words and phrases that may be inappropriate. If there are any questions or concerns please feel free to contact the dictating provider for clarification.

## 2022-04-13 NOTE — PROGRESS NOTES
Patient had an elevated troponin we rechecked an EKG it showed ST elevation in the inferior leads  We discussed about the possibility of undergoing cardiac catheterization with possible percutaneous intervention  Patient declines and says does not want any invasive treatment at present  Please note patient is DNR CCA

## 2022-04-13 NOTE — CARE COORDINATION
Received order for hospice consult. Cm spoke with son as he was leaving pt room. Son states that physician has spoken with pt and son about hospice. They have history with hospice with pt's wife.  Son in agreement with hospice referral. CM contacted [address] [place] [address] and spoke with Thony Jenkins and completed referral.

## 2022-04-13 NOTE — CONSULTS
Dr Sugey Martinez notified of PICC consult d/t CKD4. Dr Luis M Kruse PICC line. Consult reviewed with primary nurse. Education regarding PICC insertion discussed with Patient. Risks and benefits also assessed with Patient. Verbal consent given by Patient. Time out @ 20:50. PICC line inserted in GANESH without difficulty per protocol. Good blood return in both lumens and all lumens flush with ease. Patient tolerated well. Education pamphlets left @ bedside. Due to patient's AFib, placement could not be safely verified using the VPSG4 monitoring system. Stat CxR ordered for verification. Do not use PICC line at this time until placement can be confirmed. PICC Team will continue to monitor.

## 2022-04-13 NOTE — PROGRESS NOTES
PHARMACY ANTICOAGULATION MONITORING SERVICE    Barton Simmonds is a 80 y.o. male on warfarin therapy for history of DVT. Pharmacy consulted by Dr. Wei Vann for monitoring and adjustment of treatment. Indication for anticoagulation: History of DVT  INR goal: 2 - 3  Warfarin dose prior to admission: 2.5 mg po daily    Pertinent Laboratory Values   Recent Labs     04/11/22  1858 04/11/22 2215 04/13/22  0413   INR  --    < > 2.61   HGB 9.1*  --   --    HCT 30.4*  --   --      --   --     < > = values in this interval not displayed. INR MONITORING  Recent Labs     04/11/22 2215 04/12/22  0812 04/13/22  0413   INR 1.64 1.70 2.61   __________________________________    Assessment/Plan:   INR therapeutic after 3mg warfarin last night.  Total effects of warfarin 3mg dose likely not seen yet.  Therefore, reduce warfarin to 0.5mg daily.  Discontinue enoxaparin.  Trend INR daily  [address] [place] [address] will continue to monitor and adjust warfarin therapy as indicated    Thank you for the consult.   Rukhsana Duarte, [facility]  4/13/2022 7:23 AM

## 2022-04-13 NOTE — PROGRESS NOTES
Pulmonary and Critical Care  Progress Note      VITALS:  /61   Pulse 76   Temp 97.6 °F (36.4 °C) (Axillary)   Resp 22   Ht 5' 8\" (1.727 m)   Wt 167 lb (75.8 kg)   SpO2 100%   BMI 25.39 kg/m²     Subjective:   CHIEF COMPLAINT :Syncope and Collapse     HPI:                The patient is sitting in the bed. He has some nausea. He has no chest pain. He is in mild resp distress    Objective:   PHYSICAL EXAM:    LUNGS:Occasional basal crackles  Abd-soft, BS+,NT  Ext- no pedal edema  CVS-s1s2, no murmurs      DATA:    CBC:  Recent Labs     04/11/22  1858   WBC 6.1   RBC 3.12*   HGB 9.1*   HCT 30.4*      MCV 97.4   MCH 29.2   MCHC 29.9*   RDW 13.8   SEGSPCT 54.1      BMP:  Recent Labs     04/11/22  1858 04/13/22  0413   * 136   K 4.2 5.7*   CL 99 103   CO2 22 15*   BUN 19 29*   CREATININE 2.1* 3.1*   CALCIUM 8.4 8.1*   GLUCOSE 217* 141*      ABG:  Recent Labs     04/12/22  1400   PH 7.25*   PO2ART 85   MEV7AWE 39.0   O2SAT 94.7*     BNP  Lab Results   Component Value Date    BNP 83 10/10/2012      D-Dimer:  Lab Results   Component Value Date    DDIMER 709 (H) 04/11/2022      1.  Radiology: None      Assessment/Plan     Patient Active Problem List    Diagnosis Date Noted    YASMEEN (acute kidney injury) ([facility]     Hypervolemia     Syncope and collapse 04/12/2022    Pleural effusion 04/12/2022    Acute respiratory failure with hypoxemia ([address] 12/20/2021    Hypoxia     Hyponatremia     Acute pulmonary edema (HCC)     GERD (gastroesophageal reflux disease) 07/30/2021    Type 2 diabetes mellitus with kidney complication, with long-term current use of insulin ([address] 07/30/2021    Stage 4 chronic kidney disease ([address] 05/13/2021    Essential hypertension 05/12/2021    Other hyperlipidemia 05/12/2021    Other dysphagia 05/12/2021    Bilateral carotid artery stenosis 08/16/2016   Suspected LLL Pneumonia  Large left pleural effusion s/p thoracentesis- transudate  Moderate KS  CKD  Acute on Chronic Hypoxic hypercapneic resp failure  Symptomatic bradycardiam sec to SSS  NSTEMI  YASMEEN  AG Metabolic acidosis  Hyperkalemia       1. No Cath per patient  2. No HD per patient  3. Patient is now being medically managed  4. CMP at 16:00  5. Keep sats > 92%  6. Prognosis guarded  7. Hospice eval  8. Comfort care  9.  C/w present management    Electronically signed by Claire Raza MD on 4/13/2022 at 11:11 AM

## 2022-04-13 NOTE — PROGRESS NOTES
Discussed with patient and son, both declined any invasive measures and declined dialysis. I explained to both patient and son that this could result to death if nothing is done and they still declined. Requested hospice care for patient. All questions and concerns were answered. Code status changed to DNR CC and hospice consult placed.

## 2022-04-13 NOTE — PROGRESS NOTES
EKG done at this time. Dr Nicci Campos at bedside discussing findings of an acute MI with pt and son.

## 2022-04-13 NOTE — CARE COORDINATION
Intern Jackelynmunt Leader reviewed pt chart. Pt has insurance and PCP. Into pt room. Introduced self and role of CM. Pt lives with his son, Kenia Marvin). Home is a two story with two steps. Pt lives on the main floor and son lives upstairs. Kenia Lomois takes care of pt needs (driving, cooking, cleaning, picking up medication etc). Pt is active with [address], Per sticky notes, Kavon Finn is aware that pt is admitted. Pt advised that he had PT through home care and that they taught him some excesses that he continued to do up until admission. Pt advised he would like to continue home care at D/C. Pt has another son that lives a block away from him. Pt has two walkers, one with wheels, and a cane. CM asked if pt has been up in the chair. Pt advised that when nursing attempted to take him to restroom and he did not feel right. Plan at this time, home with son and [facility].

## 2022-04-13 NOTE — CONSULTS
(TRESIBA FLEXTOUCH) 200 UNIT/ML SOPN Inject 25 Units into the skin nightly 12/28/21   Jennifer Gonsalez MD   warfarin (COUMADIN) 5 MG tablet Take as directed per INR 12/29/21   Ty Vazquez MD   cilostazol (PLETAL) 50 MG tablet Take 1 tablet by mouth 2 times daily 10/20/21   Jennifer Gonsalez MD   insulin aspart (NOVOLOG FLEXPEN) 100 UNIT/ML injection pen Inject 8 Units into the skin 3 times daily (before meals) 9/10/21   Jennifer Gonsalez MD   atorvastatin (LIPITOR) 80 MG tablet Take 1 tablet by mouth daily 5/12/21   Jennifer Gonsalez MD   gabapentin (NEURONTIN) 300 MG capsule Take 300 mg by mouth 3 times daily. Historical Provider, MD   oxyCODONE-acetaminophen (PERCOCET) 7.5-325 MG per tablet Take 1 tablet by mouth 2 times daily as needed for Pain. Indications: Son reports Patient takes 3 x daily routinely Avoid Alcohol. ..causes drowsiness. Historical Provider, MD Shelly Kothari LANCETS FINE MISC 1 each 3 times daily and as needed. Historical Provider, MD   blood glucose test strips (ONETOUCH VERIO) strip 1 each 3 times daily and as  needed. Historical Provider, MD        Allergies:  Patient has no known allergies. Social History:   TOBACCO:   reports that he has never smoked. He has never used smokeless tobacco.  ETOH:   reports no history of alcohol use.   OCCUPATION:      Family History:       Problem Relation Age of Onset    No Known Problems Mother     Stroke Father     No Known Problems Sister     Heart Attack Brother     Heart Disease Brother            Physical Exam:    Vitals: /61   Pulse 76   Temp 97.6 °F (36.4 °C) (Axillary)   Resp 22   Ht 5' 8\" (1.727 m)   Wt 167 lb (75.8 kg)   SpO2 100%   BMI 25.39 kg/m²   General appearance: in no acute distress, appears stated age  Skin: Skin color, texture, turgor normal. No rashes or lesions  HEENT: normocephalic, atraumatic  Neck: supple, trachea midline  Lungs:  breathing comfortably on NC  Heart[de-identified] regular rate and rhythm, S1, S2 normal,  Abdomen: soft, non-tender; bowel sounds normal; no masses,   Extremities: extremities normal, atraumatic, no cyanosis or edema  Neurologic: Mental status: alert, oriented, interactive, following commands  Psychiatric: mood and affect appropriate    CBC:   Recent Labs     04/11/22  1858   WBC 6.1   HGB 9.1*        BMP:    Recent Labs     04/11/22 1858   *   K 4.2   CL 99   CO2 22   BUN 19   CREATININE 2.1*   GLUCOSE 217*     Hepatic:   Recent Labs     04/11/22 1858   AST 13*   ALT 8*   BILITOT 0.1   ALKPHOS 90         Assessment and Recommendations     Patient Active Problem List    Diagnosis Date Noted    Syncope and collapse 04/12/2022    Pleural effusion 04/12/2022    Acute respiratory failure with hypoxemia (HCC) 12/20/2021    Hypoxia     Hyponatremia     Acute pulmonary edema (HCC)     GERD (gastroesophageal reflux disease) 07/30/2021    Type 2 diabetes mellitus with kidney complication, with long-term current use of insulin ([facility] [address] 07/30/2021    Stage 4 chronic kidney disease ([facility] [address] 05/13/2021    Essential hypertension 05/12/2021    Other hyperlipidemia 05/12/2021    Other dysphagia 05/12/2021    Bilateral carotid artery stenosis 08/16/2016     -YASMEEN form ATN: cr 2.1, oligoanuric  -Hyponatremia  -Fluid overload  -Syncope from bradycardia    Plan;  -Obtain renal US  -Give bumex 2mg iv q8hrs  -Awaiting labs today  -He did not urinate despite a good diuretic challenge, he may end up on RRT soon  -Avoid nephrotoxins  -Critically ill    ADDENDUM:11:00  MR WALTERS HAD DCLINED DIALYSIS AND WANTS TO GO ON HOSPICE. CONTINUE SUPPORTIVE MGMT.  [address] YOU      Electronically signed by Radha Molina DO on 4/13/2022 at 7:07 MD Agata Veronica DO [address],  Greg Barrios  [facility], [place] [address]  PHONE: [phone]  FAX: [phone]

## 2022-04-13 NOTE — PROGRESS NOTES
Had long discussion with pt about heart cath and dialysis. Pt is a/o and states he does not want to have a heart cath or do dialysis. Explained to pt that because he is not urinating, fluid overload will worsen and could impair breathing, discussed possible intubation if needed due to sob. Pt said he does not want ventilator and said he thinks he wants to be on Hospice. \"I'll talk to my son about it when he gets back\". Notified Dr Betty Thomas, Dr Deedee Robb, Dr Qing Dumont and will update if pt/son make a concrete decision on Hospice. Notified IR not to place vas cath today.

## 2022-04-13 NOTE — PROGRESS NOTES
Troponin noted to have jumped from 0.16 yesterday to 1.8 this AM. Notified Dr Maliha Sánchez via [employer].

## 2022-04-14 NOTE — DISCHARGE SUMMARY
Discharge Summary    Name:  Lennox Staggers /Age/Sex: 1929  (80 y.o. male)   MRN & CSN:  [id number] & [id number] Admission Date/Time: 2022  6:47 PM   Attending:  No att. providers found Discharging Physician: Wendy Robison MD     Hospital Course:   Lennox Staggers is a 80 y.o.  male  who presents with Syncope and collapse    Patient  03:25 am on 2022    Condition managed during course of hospitalization:   1) Syncope and collapse  -Likely cardiogenic from inferior wall STEMI  -Head CT unremarkable   -EKG: Significant for junctional rhythm. Repeat EKG later in the day was concerning for inferior wall STEMI.  Patient and son declined any invasive measures  -TTE with EF of 55-60% as well as signs of Rt ventricular volume/pressure overload and positive Dai sign  -CTA chest: No PE  -Cardiology on board; patient declined C  -Code status was changed to DNR CC and Hospice consulted per patient and son's request.     2) Acute on Chronic Hypoxic Respiratory Failure  -Likely due to pleural effusion and possible PNA  -Wears 2 L of O2 at baseline; wean to baseline   -S/P IR thoracocentesis with drainage of 980cc of fluid  -Follow cx, cytology not sent unfortunately      3)  Left multifocal pneumonia:  -CT chest shows large left pleural effusion and large area of consolidation involving the left mid to lower lung, concerning for airspace disease  -Continue IV Rocephin and Zithromax     4) YASMEEN on CKD 4   -Now concerning SABA as patient received IV contrast  -Avoid nephrotoxic medications   -Nephrology on board; patient and son declined RRT  -Continue medical management for now     5) Acute on Chronic HFpEF  -Diuresis per nephrology  -Continue to monitor     6) Cardiogenic Shock  -Likely due to inferior wall STEMI  -Was on pressor    Other chronic medical conditions, medication resumed unless contraindicated     DM type II  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 Read the directions carefully, and ask your doctor or other care provider to review them with you.             ASK your doctor about these medications    atenolol 50 MG tablet  Commonly known as: TENORMIN  Take 1 tablet by mouth daily     atorvastatin 80 MG tablet  Commonly known as: LIPITOR  Take 1 tablet by mouth daily     cilostazol 50 MG tablet  Commonly known as: PLETAL  Take 1 tablet by mouth 2 times daily     furosemide 20 MG tablet  Commonly known as: LASIX  Take 2 tablets by mouth every morning     gabapentin 300 MG capsule  Commonly known as: NEURONTIN     glimepiride 4 MG tablet  Commonly known as: AMARYL  Take 1 tablet by mouth every morning (before breakfast)     NovoLOG FlexPen 100 UNIT/ML injection pen  Generic drug: insulin aspart  Inject 8 Units into the skin 3 times daily (before meals)     OneTouch Verio strip  Generic drug: blood glucose test strips     oxyCODONE-acetaminophen 7.5-325 MG per tablet  Commonly known as: PERCOCET     Tresiba FlexTouch 200 UNIT/ML Sopn  Generic drug: Insulin Degludec  Inject 25 Units into the skin nightly     warfarin 5 MG tablet  Commonly known as: COUMADIN  Take as directed per INR            Objective Findings at Discharge:   BP (!) 98/50   Pulse 52   Temp 97.5 °F (36.4 °C) (Oral)   Resp 14   Ht 5' 8\" (1.727 m)   Wt 167 lb (75.8 kg)   SpO2 (!) 51%   BMI 25.39 kg/m²                BMP/CBC  Recent Labs 04/11/22  1858 04/13/22  0413   * 136   K 4.2 5.7*   CL 99 103   CO2 22 15*   BUN 19 29*   CREATININE 2.1* 3.1*   WBC 6.1  --    HCT 30.4*  --      --        IMAGING:  As above    Electronically signed by Zeina Mast MD on 4/14/2022 at 7:12 AM

## 2022-04-14 NOTE — PROGRESS NOTES
Was at patients bedside with patients daughter. There was no heart beat, no respiration and no blood pressure. Ratna Fleming RN, and [employer] both came to bedside to listen for heartbeat as well as myself. Upon assessment the pt was found to be with out a heartbeat and was pronounced .  Pete Stewart RN

## 2022-04-15 LAB
CA 19-9: 7 U/ML
MS ALPHA-FETOPROTEIN: 4 NG/ML (ref 0–9)

## 2022-04-17 LAB
CULTURE: NORMAL
CULTURE: NORMAL
Lab: NORMAL
Lab: NORMAL
SPECIMEN: NORMAL
SPECIMEN: NORMAL